# Patient Record
Sex: MALE | Race: WHITE | NOT HISPANIC OR LATINO | Employment: OTHER | URBAN - METROPOLITAN AREA
[De-identification: names, ages, dates, MRNs, and addresses within clinical notes are randomized per-mention and may not be internally consistent; named-entity substitution may affect disease eponyms.]

---

## 2017-01-01 ENCOUNTER — TELEPHONE (OUTPATIENT)
Dept: NEUROLOGY | Facility: MEDICAL CENTER | Age: 82
End: 2017-01-01

## 2017-01-01 ENCOUNTER — TELEPHONE (OUTPATIENT)
Dept: MEDICAL GROUP | Facility: CLINIC | Age: 82
End: 2017-01-01

## 2017-01-01 ENCOUNTER — HOSPITAL ENCOUNTER (OUTPATIENT)
Dept: LAB | Facility: MEDICAL CENTER | Age: 82
End: 2017-07-21
Attending: FAMILY MEDICINE
Payer: MEDICARE

## 2017-01-01 ENCOUNTER — TELEPHONE (OUTPATIENT)
Dept: CARDIOLOGY | Facility: MEDICAL CENTER | Age: 82
End: 2017-01-01

## 2017-01-01 ENCOUNTER — OFFICE VISIT (OUTPATIENT)
Dept: NEPHROLOGY | Facility: MEDICAL CENTER | Age: 82
End: 2017-01-01
Payer: MEDICARE

## 2017-01-01 ENCOUNTER — HOSPITAL ENCOUNTER (OUTPATIENT)
Dept: RADIOLOGY | Facility: MEDICAL CENTER | Age: 82
End: 2017-05-23
Attending: PHYSICIAN ASSISTANT
Payer: MEDICARE

## 2017-01-01 ENCOUNTER — HOSPITAL ENCOUNTER (OUTPATIENT)
Facility: MEDICAL CENTER | Age: 82
End: 2017-11-14
Attending: FAMILY MEDICINE
Payer: MEDICARE

## 2017-01-01 ENCOUNTER — PATIENT MESSAGE (OUTPATIENT)
Dept: MEDICAL GROUP | Facility: CLINIC | Age: 82
End: 2017-01-01

## 2017-01-01 ENCOUNTER — APPOINTMENT (OUTPATIENT)
Dept: RADIOLOGY | Facility: MEDICAL CENTER | Age: 82
End: 2017-01-01
Attending: PSYCHIATRY & NEUROLOGY
Payer: MEDICARE

## 2017-01-01 ENCOUNTER — APPOINTMENT (OUTPATIENT)
Dept: RADIOLOGY | Facility: MEDICAL CENTER | Age: 82
DRG: 682 | End: 2017-01-01
Attending: EMERGENCY MEDICINE
Payer: MEDICARE

## 2017-01-01 ENCOUNTER — APPOINTMENT (OUTPATIENT)
Dept: RADIOLOGY | Facility: MEDICAL CENTER | Age: 82
End: 2017-01-01
Attending: EMERGENCY MEDICINE
Payer: MEDICARE

## 2017-01-01 ENCOUNTER — OFFICE VISIT (OUTPATIENT)
Dept: URGENT CARE | Facility: PHYSICIAN GROUP | Age: 82
End: 2017-01-01
Payer: MEDICARE

## 2017-01-01 ENCOUNTER — HOSPITAL ENCOUNTER (OUTPATIENT)
Dept: RADIOLOGY | Facility: MEDICAL CENTER | Age: 82
End: 2017-03-31
Attending: PSYCHIATRY & NEUROLOGY
Payer: MEDICARE

## 2017-01-01 ENCOUNTER — PATIENT OUTREACH (OUTPATIENT)
Dept: HEALTH INFORMATION MANAGEMENT | Facility: OTHER | Age: 82
End: 2017-01-01

## 2017-01-01 ENCOUNTER — HOSPITAL ENCOUNTER (OUTPATIENT)
Dept: LAB | Facility: MEDICAL CENTER | Age: 82
End: 2017-03-20
Attending: FAMILY MEDICINE
Payer: MEDICARE

## 2017-01-01 ENCOUNTER — APPOINTMENT (OUTPATIENT)
Dept: URGENT CARE | Facility: PHYSICIAN GROUP | Age: 82
End: 2017-01-01
Payer: MEDICARE

## 2017-01-01 ENCOUNTER — OFFICE VISIT (OUTPATIENT)
Dept: NEUROLOGY | Facility: MEDICAL CENTER | Age: 82
End: 2017-01-01
Payer: MEDICARE

## 2017-01-01 ENCOUNTER — APPOINTMENT (OUTPATIENT)
Dept: RADIOLOGY | Facility: MEDICAL CENTER | Age: 82
DRG: 689 | End: 2017-01-01
Attending: EMERGENCY MEDICINE
Payer: MEDICARE

## 2017-01-01 ENCOUNTER — HOSPITAL ENCOUNTER (OUTPATIENT)
Dept: LAB | Facility: MEDICAL CENTER | Age: 82
End: 2017-03-20
Attending: INTERNAL MEDICINE
Payer: MEDICARE

## 2017-01-01 ENCOUNTER — HOSPITAL ENCOUNTER (INPATIENT)
Facility: MEDICAL CENTER | Age: 82
LOS: 4 days | DRG: 682 | End: 2017-06-01
Attending: EMERGENCY MEDICINE | Admitting: HOSPITALIST
Payer: MEDICARE

## 2017-01-01 ENCOUNTER — NON-PROVIDER VISIT (OUTPATIENT)
Dept: NEUROLOGY | Facility: MEDICAL CENTER | Age: 82
End: 2017-01-01
Payer: MEDICARE

## 2017-01-01 ENCOUNTER — TELEPHONE (OUTPATIENT)
Dept: MEDICAL GROUP | Facility: PHYSICIAN GROUP | Age: 82
End: 2017-01-01

## 2017-01-01 ENCOUNTER — HOSPITAL ENCOUNTER (INPATIENT)
Facility: MEDICAL CENTER | Age: 82
LOS: 2 days | DRG: 689 | End: 2017-09-06
Attending: EMERGENCY MEDICINE | Admitting: INTERNAL MEDICINE
Payer: MEDICARE

## 2017-01-01 ENCOUNTER — RESOLUTE PROFESSIONAL BILLING HOSPITAL PROF FEE (OUTPATIENT)
Dept: HOSPITALIST | Facility: MEDICAL CENTER | Age: 82
End: 2017-01-01
Payer: MEDICARE

## 2017-01-01 ENCOUNTER — HOSPITAL ENCOUNTER (EMERGENCY)
Facility: MEDICAL CENTER | Age: 82
End: 2017-05-16
Attending: EMERGENCY MEDICINE
Payer: MEDICARE

## 2017-01-01 ENCOUNTER — OFFICE VISIT (OUTPATIENT)
Dept: MEDICAL GROUP | Facility: CLINIC | Age: 82
End: 2017-01-01
Payer: MEDICARE

## 2017-01-01 ENCOUNTER — DEVICE CHECK (OUTPATIENT)
Dept: CARDIOLOGY | Facility: MEDICAL CENTER | Age: 82
End: 2017-01-01

## 2017-01-01 ENCOUNTER — HOSPITAL ENCOUNTER (OUTPATIENT)
Dept: LAB | Facility: MEDICAL CENTER | Age: 82
End: 2017-05-11
Attending: INTERNAL MEDICINE
Payer: MEDICARE

## 2017-01-01 ENCOUNTER — HOSPITAL ENCOUNTER (EMERGENCY)
Facility: MEDICAL CENTER | Age: 82
End: 2017-04-30
Attending: EMERGENCY MEDICINE
Payer: MEDICARE

## 2017-01-01 ENCOUNTER — HOSPITAL ENCOUNTER (EMERGENCY)
Facility: MEDICAL CENTER | Age: 82
End: 2017-11-21
Attending: EMERGENCY MEDICINE
Payer: MEDICARE

## 2017-01-01 ENCOUNTER — TELEPHONE (OUTPATIENT)
Dept: NEPHROLOGY | Facility: MEDICAL CENTER | Age: 82
End: 2017-01-01

## 2017-01-01 VITALS
RESPIRATION RATE: 14 BRPM | BODY MASS INDEX: 22.4 KG/M2 | HEART RATE: 55 BPM | DIASTOLIC BLOOD PRESSURE: 60 MMHG | WEIGHT: 160 LBS | HEIGHT: 71 IN | TEMPERATURE: 98 F | SYSTOLIC BLOOD PRESSURE: 150 MMHG

## 2017-01-01 VITALS
SYSTOLIC BLOOD PRESSURE: 152 MMHG | OXYGEN SATURATION: 97 % | TEMPERATURE: 97.5 F | WEIGHT: 156 LBS | BODY MASS INDEX: 21.84 KG/M2 | RESPIRATION RATE: 17 BRPM | HEART RATE: 56 BPM | HEIGHT: 71 IN | DIASTOLIC BLOOD PRESSURE: 44 MMHG

## 2017-01-01 VITALS
RESPIRATION RATE: 20 BRPM | WEIGHT: 164 LBS | HEIGHT: 71 IN | TEMPERATURE: 97.7 F | OXYGEN SATURATION: 97 % | HEART RATE: 53 BPM | SYSTOLIC BLOOD PRESSURE: 152 MMHG | BODY MASS INDEX: 22.96 KG/M2 | DIASTOLIC BLOOD PRESSURE: 60 MMHG

## 2017-01-01 VITALS
BODY MASS INDEX: 26.21 KG/M2 | RESPIRATION RATE: 14 BRPM | TEMPERATURE: 98.1 F | HEIGHT: 67 IN | OXYGEN SATURATION: 98 % | SYSTOLIC BLOOD PRESSURE: 150 MMHG | DIASTOLIC BLOOD PRESSURE: 56 MMHG | WEIGHT: 167 LBS | HEART RATE: 64 BPM

## 2017-01-01 VITALS
BODY MASS INDEX: 23.1 KG/M2 | HEART RATE: 78 BPM | DIASTOLIC BLOOD PRESSURE: 64 MMHG | OXYGEN SATURATION: 98 % | RESPIRATION RATE: 16 BRPM | WEIGHT: 165 LBS | TEMPERATURE: 98 F | SYSTOLIC BLOOD PRESSURE: 136 MMHG | HEIGHT: 71 IN

## 2017-01-01 VITALS
HEART RATE: 60 BPM | WEIGHT: 146 LBS | HEIGHT: 71 IN | BODY MASS INDEX: 20.44 KG/M2 | TEMPERATURE: 98.1 F | OXYGEN SATURATION: 94 % | DIASTOLIC BLOOD PRESSURE: 50 MMHG | RESPIRATION RATE: 16 BRPM | SYSTOLIC BLOOD PRESSURE: 110 MMHG

## 2017-01-01 VITALS
SYSTOLIC BLOOD PRESSURE: 140 MMHG | HEART RATE: 62 BPM | DIASTOLIC BLOOD PRESSURE: 52 MMHG | WEIGHT: 165 LBS | RESPIRATION RATE: 14 BRPM | OXYGEN SATURATION: 94 % | TEMPERATURE: 99.2 F | BODY MASS INDEX: 23.02 KG/M2

## 2017-01-01 VITALS
BODY MASS INDEX: 22.58 KG/M2 | DIASTOLIC BLOOD PRESSURE: 50 MMHG | WEIGHT: 149 LBS | HEART RATE: 56 BPM | SYSTOLIC BLOOD PRESSURE: 152 MMHG | HEIGHT: 68 IN | RESPIRATION RATE: 16 BRPM | TEMPERATURE: 97.6 F

## 2017-01-01 VITALS
OXYGEN SATURATION: 93 % | DIASTOLIC BLOOD PRESSURE: 62 MMHG | HEART RATE: 82 BPM | SYSTOLIC BLOOD PRESSURE: 158 MMHG | RESPIRATION RATE: 16 BRPM | BODY MASS INDEX: 22.4 KG/M2 | HEIGHT: 71 IN | WEIGHT: 160 LBS | TEMPERATURE: 98.8 F

## 2017-01-01 VITALS
DIASTOLIC BLOOD PRESSURE: 58 MMHG | HEART RATE: 68 BPM | HEIGHT: 70 IN | TEMPERATURE: 97.9 F | SYSTOLIC BLOOD PRESSURE: 158 MMHG | WEIGHT: 149 LBS | RESPIRATION RATE: 16 BRPM | OXYGEN SATURATION: 99 % | BODY MASS INDEX: 21.33 KG/M2

## 2017-01-01 VITALS
OXYGEN SATURATION: 97 % | RESPIRATION RATE: 16 BRPM | SYSTOLIC BLOOD PRESSURE: 152 MMHG | TEMPERATURE: 98.1 F | BODY MASS INDEX: 25.25 KG/M2 | HEART RATE: 55 BPM | WEIGHT: 176.37 LBS | DIASTOLIC BLOOD PRESSURE: 43 MMHG | HEIGHT: 70 IN

## 2017-01-01 VITALS
BODY MASS INDEX: 21.07 KG/M2 | TEMPERATURE: 98 F | DIASTOLIC BLOOD PRESSURE: 72 MMHG | OXYGEN SATURATION: 96 % | RESPIRATION RATE: 16 BRPM | HEART RATE: 66 BPM | SYSTOLIC BLOOD PRESSURE: 154 MMHG | WEIGHT: 151 LBS

## 2017-01-01 VITALS
HEIGHT: 68 IN | RESPIRATION RATE: 18 BRPM | DIASTOLIC BLOOD PRESSURE: 62 MMHG | HEART RATE: 67 BPM | WEIGHT: 144.18 LBS | BODY MASS INDEX: 21.85 KG/M2 | SYSTOLIC BLOOD PRESSURE: 137 MMHG | TEMPERATURE: 96.7 F | OXYGEN SATURATION: 97 %

## 2017-01-01 VITALS
HEIGHT: 71 IN | BODY MASS INDEX: 21.73 KG/M2 | TEMPERATURE: 98.2 F | RESPIRATION RATE: 18 BRPM | HEART RATE: 56 BPM | SYSTOLIC BLOOD PRESSURE: 152 MMHG | WEIGHT: 155.2 LBS | DIASTOLIC BLOOD PRESSURE: 58 MMHG | OXYGEN SATURATION: 92 %

## 2017-01-01 VITALS
TEMPERATURE: 98 F | DIASTOLIC BLOOD PRESSURE: 60 MMHG | BODY MASS INDEX: 25.58 KG/M2 | HEIGHT: 67 IN | WEIGHT: 163 LBS | RESPIRATION RATE: 14 BRPM | HEART RATE: 60 BPM | SYSTOLIC BLOOD PRESSURE: 144 MMHG

## 2017-01-01 DIAGNOSIS — I10 ESSENTIAL HYPERTENSION: ICD-10-CM

## 2017-01-01 DIAGNOSIS — J18.9 PNEUMONIA DUE TO INFECTIOUS ORGANISM, UNSPECIFIED LATERALITY, UNSPECIFIED PART OF LUNG: ICD-10-CM

## 2017-01-01 DIAGNOSIS — E03.9 HYPOTHYROIDISM, UNSPECIFIED TYPE: ICD-10-CM

## 2017-01-01 DIAGNOSIS — R41.0 TRANSIENT CONFUSION: Primary | ICD-10-CM

## 2017-01-01 DIAGNOSIS — E03.4 IDIOPATHIC ATROPHIC HYPOTHYROIDISM: ICD-10-CM

## 2017-01-01 DIAGNOSIS — N40.1 BENIGN PROSTATIC HYPERPLASIA WITH LOWER URINARY TRACT SYMPTOMS, UNSPECIFIED MORPHOLOGY: ICD-10-CM

## 2017-01-01 DIAGNOSIS — N17.9 ACUTE KIDNEY INJURY SUPERIMPOSED ON CHRONIC KIDNEY DISEASE (HCC): ICD-10-CM

## 2017-01-01 DIAGNOSIS — N18.30 CHRONIC KIDNEY DISEASE (CKD), STAGE III (MODERATE) (HCC): ICD-10-CM

## 2017-01-01 DIAGNOSIS — M1A.00X0 IDIOPATHIC CHRONIC GOUT WITHOUT TOPHUS, UNSPECIFIED SITE: ICD-10-CM

## 2017-01-01 DIAGNOSIS — E61.1 IRON DEFICIENCY: ICD-10-CM

## 2017-01-01 DIAGNOSIS — M79.642 HAND PAIN, LEFT: ICD-10-CM
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
## 2017-01-01 DIAGNOSIS — E87.5 HYPERKALEMIA: ICD-10-CM

## 2017-01-01 DIAGNOSIS — E53.8 B12 DEFICIENCY: ICD-10-CM

## 2017-01-01 DIAGNOSIS — I25.701 CORONARY ARTERY DISEASE INVOLVING CORONARY BYPASS GRAFT OF NATIVE HEART WITH ANGINA PECTORIS WITH DOCUMENTED SPASM (HCC): ICD-10-CM

## 2017-01-01 DIAGNOSIS — N40.1 BENIGN NON-NODULAR PROSTATIC HYPERPLASIA WITH LOWER URINARY TRACT SYMPTOMS: ICD-10-CM

## 2017-01-01 DIAGNOSIS — I20.89 ANGINA EFFORT: ICD-10-CM

## 2017-01-01 DIAGNOSIS — K59.01 SLOW TRANSIT CONSTIPATION: ICD-10-CM

## 2017-01-01 DIAGNOSIS — N18.9 ACUTE KIDNEY INJURY SUPERIMPOSED ON CHRONIC KIDNEY DISEASE (HCC): ICD-10-CM

## 2017-01-01 DIAGNOSIS — R42 VERTIGO: ICD-10-CM

## 2017-01-01 DIAGNOSIS — Z23 NEED FOR PNEUMOCOCCAL VACCINATION: ICD-10-CM

## 2017-01-01 DIAGNOSIS — R41.0 TRANSIENT CONFUSION: ICD-10-CM

## 2017-01-01 DIAGNOSIS — I27.20 PULMONARY HTN (HCC): ICD-10-CM

## 2017-01-01 DIAGNOSIS — I77.71 CAROTID ARTERY DISSECTION (HCC): ICD-10-CM

## 2017-01-01 DIAGNOSIS — N25.81 SECONDARY HYPERPARATHYROIDISM OF RENAL ORIGIN (HCC): ICD-10-CM

## 2017-01-01 DIAGNOSIS — N18.5 CKD (CHRONIC KIDNEY DISEASE), STAGE V (HCC): ICD-10-CM

## 2017-01-01 DIAGNOSIS — D51.0 PERNICIOUS ANEMIA: ICD-10-CM

## 2017-01-01 DIAGNOSIS — N18.4 CHRONIC KIDNEY DISEASE, STAGE IV (SEVERE) (HCC): ICD-10-CM

## 2017-01-01 DIAGNOSIS — R32 INTERMITTENT URINARY INCONTINENCE: ICD-10-CM

## 2017-01-01 DIAGNOSIS — R41.0 CONFUSION AND DISORIENTATION: ICD-10-CM

## 2017-01-01 DIAGNOSIS — R00.1 BRADYCARDIA: ICD-10-CM

## 2017-01-01 DIAGNOSIS — L08.9 INFECTED SKIN TEAR: ICD-10-CM

## 2017-01-01 DIAGNOSIS — H93.13 TINNITUS, BILATERAL: ICD-10-CM

## 2017-01-01 DIAGNOSIS — M10.9 GOUTY ARTHRITIS: ICD-10-CM

## 2017-01-01 DIAGNOSIS — H93.13 RINGING IN EARS, BILATERAL: ICD-10-CM

## 2017-01-01 DIAGNOSIS — E53.8 VITAMIN B 12 DEFICIENCY: ICD-10-CM

## 2017-01-01 DIAGNOSIS — F03.91 DEMENTIA WITH BEHAVIORAL DISTURBANCE, UNSPECIFIED DEMENTIA TYPE: ICD-10-CM

## 2017-01-01 DIAGNOSIS — R41.3 MEMORY PROBLEM: ICD-10-CM

## 2017-01-01 DIAGNOSIS — R41.0 CHRONIC CONFUSION: ICD-10-CM

## 2017-01-01 DIAGNOSIS — E55.9 VITAMIN D DEFICIENCY DISEASE: ICD-10-CM

## 2017-01-01 DIAGNOSIS — D53.9 MACROCYTIC ANEMIA: ICD-10-CM

## 2017-01-01 DIAGNOSIS — I25.111 CORONARY ARTERY DISEASE INVOLVING NATIVE CORONARY ARTERY OF NATIVE HEART WITH ANGINA PECTORIS WITH DOCUMENTED SPASM (HCC): ICD-10-CM

## 2017-01-01 DIAGNOSIS — R07.9 CHEST PAIN, UNSPECIFIED TYPE: ICD-10-CM

## 2017-01-01 DIAGNOSIS — Z91.81 AT RISK FOR FALLS: ICD-10-CM

## 2017-01-01 DIAGNOSIS — R29.898 BILATERAL LEG WEAKNESS: ICD-10-CM

## 2017-01-01 DIAGNOSIS — T14.8XXA INFECTED SKIN TEAR: ICD-10-CM

## 2017-01-01 DIAGNOSIS — E46 PROTEIN CALORIE MALNUTRITION (HCC): ICD-10-CM

## 2017-01-01 DIAGNOSIS — E44.1 MILD PROTEIN-CALORIE MALNUTRITION (HCC): ICD-10-CM

## 2017-01-01 DIAGNOSIS — N28.9 RENAL INSUFFICIENCY: ICD-10-CM

## 2017-01-01 DIAGNOSIS — Z51.89 VISIT FOR WOUND CHECK: ICD-10-CM

## 2017-01-01 DIAGNOSIS — R47.1 DYSARTHRIA: ICD-10-CM

## 2017-01-01 LAB
25(OH)D3 SERPL-MCNC: 84 NG/ML (ref 30–100)
25(OH)D3 SERPL-MCNC: 85 NG/ML (ref 30–100)
ALBUMIN SERPL BCP-MCNC: 3.2 G/DL (ref 3.2–4.9)
ALBUMIN SERPL BCP-MCNC: 3.7 G/DL (ref 3.2–4.9)
ALBUMIN SERPL BCP-MCNC: 3.7 G/DL (ref 3.2–4.9)
ALBUMIN SERPL BCP-MCNC: 3.8 G/DL (ref 3.2–4.9)
ALBUMIN SERPL BCP-MCNC: 3.9 G/DL (ref 3.2–4.9)
ALBUMIN SERPL BCP-MCNC: 3.9 G/DL (ref 3.2–4.9)
ALBUMIN/GLOB SERPL: 1.3 G/DL
ALBUMIN/GLOB SERPL: 1.4 G/DL
ALBUMIN/GLOB SERPL: 1.5 G/DL
ALBUMIN/GLOB SERPL: 1.8 G/DL
ALP SERPL-CCNC: 48 U/L (ref 30–99)
ALP SERPL-CCNC: 53 U/L (ref 30–99)
ALP SERPL-CCNC: 56 U/L (ref 30–99)
ALP SERPL-CCNC: 61 U/L (ref 30–99)
ALP SERPL-CCNC: 62 U/L (ref 30–99)
ALP SERPL-CCNC: 71 U/L (ref 30–99)
ALT SERPL-CCNC: 13 U/L (ref 2–50)
ALT SERPL-CCNC: 13 U/L (ref 2–50)
ALT SERPL-CCNC: 6 U/L (ref 2–50)
ALT SERPL-CCNC: 8 U/L (ref 2–50)
ALT SERPL-CCNC: 8 U/L (ref 2–50)
ALT SERPL-CCNC: 9 U/L (ref 2–50)
AMORPH CRY #/AREA URNS HPF: PRESENT /HPF
AMPHET UR QL SCN: NEGATIVE
ANION GAP SERPL CALC-SCNC: 10 MMOL/L (ref 0–11.9)
ANION GAP SERPL CALC-SCNC: 5 MMOL/L (ref 0–11.9)
ANION GAP SERPL CALC-SCNC: 6 MMOL/L (ref 0–11.9)
ANION GAP SERPL CALC-SCNC: 7 MMOL/L (ref 0–11.9)
ANION GAP SERPL CALC-SCNC: 8 MMOL/L (ref 0–11.9)
ANION GAP SERPL CALC-SCNC: 9 MMOL/L (ref 0–11.9)
APPEARANCE UR: ABNORMAL
APPEARANCE UR: CLEAR
AST SERPL-CCNC: 11 U/L (ref 12–45)
AST SERPL-CCNC: 11 U/L (ref 12–45)
AST SERPL-CCNC: 12 U/L (ref 12–45)
AST SERPL-CCNC: 12 U/L (ref 12–45)
AST SERPL-CCNC: 13 U/L (ref 12–45)
AST SERPL-CCNC: 19 U/L (ref 12–45)
BACTERIA #/AREA URNS HPF: ABNORMAL /HPF
BACTERIA #/AREA URNS HPF: NEGATIVE /HPF
BACTERIA #/AREA URNS HPF: NEGATIVE /HPF
BACTERIA BLD CULT: NORMAL
BACTERIA UR CULT: NORMAL
BARBITURATES UR QL SCN: NEGATIVE
BASOPHILS # BLD AUTO: 0 X10E3/UL (ref 0–0.2)
BASOPHILS # BLD AUTO: 0.4 % (ref 0–1.8)
BASOPHILS # BLD AUTO: 0.5 % (ref 0–1.8)
BASOPHILS # BLD AUTO: 0.5 % (ref 0–1.8)
BASOPHILS # BLD AUTO: 0.7 % (ref 0–1.8)
BASOPHILS # BLD AUTO: 0.7 % (ref 0–1.8)
BASOPHILS # BLD AUTO: 0.9 % (ref 0–1.8)
BASOPHILS # BLD: 0.02 K/UL (ref 0–0.12)
BASOPHILS # BLD: 0.02 K/UL (ref 0–0.12)
BASOPHILS # BLD: 0.03 K/UL (ref 0–0.12)
BASOPHILS # BLD: 0.04 K/UL (ref 0–0.12)
BASOPHILS NFR BLD AUTO: 0 %
BENZODIAZ UR QL SCN: NEGATIVE
BILIRUB SERPL-MCNC: 1.3 MG/DL (ref 0.1–1.5)
BILIRUB SERPL-MCNC: 1.3 MG/DL (ref 0.1–1.5)
BILIRUB SERPL-MCNC: 1.5 MG/DL (ref 0.1–1.5)
BILIRUB SERPL-MCNC: 1.6 MG/DL (ref 0.1–1.5)
BILIRUB SERPL-MCNC: 1.8 MG/DL (ref 0.1–1.5)
BILIRUB SERPL-MCNC: 1.8 MG/DL (ref 0.1–1.5)
BILIRUB UR QL STRIP.AUTO: NEGATIVE
BNP SERPL-MCNC: 704 PG/ML (ref 0–100)
BUN SERPL-MCNC: 21 MG/DL (ref 8–22)
BUN SERPL-MCNC: 25 MG/DL (ref 8–22)
BUN SERPL-MCNC: 27 MG/DL (ref 8–22)
BUN SERPL-MCNC: 32 MG/DL (ref 8–22)
BUN SERPL-MCNC: 34 MG/DL (ref 8–22)
BUN SERPL-MCNC: 35 MG/DL (ref 8–22)
BUN SERPL-MCNC: 44 MG/DL (ref 8–22)
BUN SERPL-MCNC: 44 MG/DL (ref 8–22)
BUN SERPL-MCNC: 45 MG/DL (ref 8–22)
BUN SERPL-MCNC: 46 MG/DL (ref 8–22)
BUN SERPL-MCNC: 49 MG/DL (ref 8–22)
BUN SERPL-MCNC: 59 MG/DL (ref 8–22)
BUN SERPL-MCNC: 66 MG/DL (ref 8–22)
BUN SERPL-MCNC: 66 MG/DL (ref 8–22)
BZE UR QL SCN: NEGATIVE
CALCIUM SERPL-MCNC: 10.3 MG/DL (ref 8.5–10.5)
CALCIUM SERPL-MCNC: 8.2 MG/DL (ref 8.5–10.5)
CALCIUM SERPL-MCNC: 8.9 MG/DL (ref 8.5–10.5)
CALCIUM SERPL-MCNC: 8.9 MG/DL (ref 8.5–10.5)
CALCIUM SERPL-MCNC: 9 MG/DL (ref 8.5–10.5)
CALCIUM SERPL-MCNC: 9 MG/DL (ref 8.5–10.5)
CALCIUM SERPL-MCNC: 9.2 MG/DL (ref 8.5–10.5)
CALCIUM SERPL-MCNC: 9.3 MG/DL (ref 8.5–10.5)
CALCIUM SERPL-MCNC: 9.5 MG/DL (ref 8.5–10.5)
CALCIUM SERPL-MCNC: 9.6 MG/DL (ref 8.5–10.5)
CALCIUM SERPL-MCNC: 9.7 MG/DL (ref 8.5–10.5)
CANNABINOIDS UR QL SCN: NEGATIVE
CHLORIDE SERPL-SCNC: 105 MMOL/L (ref 96–112)
CHLORIDE SERPL-SCNC: 107 MMOL/L (ref 96–112)
CHLORIDE SERPL-SCNC: 108 MMOL/L (ref 96–112)
CHLORIDE SERPL-SCNC: 108 MMOL/L (ref 96–112)
CHLORIDE SERPL-SCNC: 109 MMOL/L (ref 96–112)
CHLORIDE SERPL-SCNC: 110 MMOL/L (ref 96–112)
CHLORIDE SERPL-SCNC: 110 MMOL/L (ref 96–112)
CHLORIDE SERPL-SCNC: 112 MMOL/L (ref 96–112)
CHLORIDE SERPL-SCNC: 113 MMOL/L (ref 96–112)
CHLORIDE SERPL-SCNC: 114 MMOL/L (ref 96–112)
CHLORIDE SERPL-SCNC: 115 MMOL/L (ref 96–112)
CHOLEST SERPL-MCNC: 116 MG/DL (ref 100–199)
CO2 SERPL-SCNC: 16 MMOL/L (ref 20–33)
CO2 SERPL-SCNC: 18 MMOL/L (ref 20–33)
CO2 SERPL-SCNC: 18 MMOL/L (ref 20–33)
CO2 SERPL-SCNC: 20 MMOL/L (ref 20–33)
CO2 SERPL-SCNC: 22 MMOL/L (ref 20–33)
CO2 SERPL-SCNC: 23 MMOL/L (ref 20–33)
CO2 SERPL-SCNC: 25 MMOL/L (ref 20–33)
COLOR UR AUTO: NORMAL
COLOR UR: NORMAL
COLOR UR: YELLOW
CREAT SERPL-MCNC: 1.57 MG/DL (ref 0.5–1.4)
CREAT SERPL-MCNC: 1.67 MG/DL (ref 0.5–1.4)
CREAT SERPL-MCNC: 1.67 MG/DL (ref 0.5–1.4)
CREAT SERPL-MCNC: 1.71 MG/DL (ref 0.5–1.4)
CREAT SERPL-MCNC: 1.84 MG/DL (ref 0.5–1.4)
CREAT SERPL-MCNC: 2 MG/DL (ref 0.5–1.4)
CREAT SERPL-MCNC: 2.02 MG/DL (ref 0.5–1.4)
CREAT SERPL-MCNC: 2.05 MG/DL (ref 0.5–1.4)
CREAT SERPL-MCNC: 2.2 MG/DL (ref 0.5–1.4)
CREAT SERPL-MCNC: 2.24 MG/DL (ref 0.5–1.4)
CREAT SERPL-MCNC: 2.29 MG/DL (ref 0.5–1.4)
CREAT SERPL-MCNC: 2.3 MG/DL (ref 0.5–1.4)
CREAT SERPL-MCNC: 2.39 MG/DL (ref 0.5–1.4)
CREAT SERPL-MCNC: 2.51 MG/DL (ref 0.5–1.4)
CREAT UR-MCNC: 50.3 MG/DL
CREAT UR-MCNC: 92.8 MG/DL
CULTURE IF INDICATED INDCX: NO UA CULTURE
CULTURE IF INDICATED INDCX: YES UA CULTURE
EKG IMPRESSION: NORMAL
EOSINOPHIL # BLD AUTO: 0.03 K/UL (ref 0–0.51)
EOSINOPHIL # BLD AUTO: 0.08 K/UL (ref 0–0.51)
EOSINOPHIL # BLD AUTO: 0.09 K/UL (ref 0–0.51)
EOSINOPHIL # BLD AUTO: 0.1 X10E3/UL (ref 0–0.4)
EOSINOPHIL # BLD AUTO: 0.12 K/UL (ref 0–0.51)
EOSINOPHIL # BLD AUTO: 0.18 K/UL (ref 0–0.51)
EOSINOPHIL # BLD AUTO: 0.19 K/UL (ref 0–0.51)
EOSINOPHIL NFR BLD AUTO: 2 %
EOSINOPHIL NFR BLD: 0.4 % (ref 0–6.9)
EOSINOPHIL NFR BLD: 1.5 % (ref 0–6.9)
EOSINOPHIL NFR BLD: 1.8 % (ref 0–6.9)
EOSINOPHIL NFR BLD: 1.8 % (ref 0–6.9)
EOSINOPHIL NFR BLD: 2 % (ref 0–6.9)
EOSINOPHIL NFR BLD: 2.1 % (ref 0–6.9)
EOSINOPHIL NFR BLD: 2.7 % (ref 0–6.9)
EOSINOPHIL NFR BLD: 2.8 % (ref 0–6.9)
EOSINOPHIL NFR BLD: 3.2 % (ref 0–6.9)
EPI CELLS #/AREA URNS HPF: ABNORMAL /HPF
EPI CELLS #/AREA URNS HPF: NEGATIVE /HPF
EPI CELLS #/AREA URNS HPF: NEGATIVE /HPF
ERYTHROCYTE [DISTWIDTH] IN BLOOD BY AUTOMATED COUNT: 14.5 % (ref 12.3–15.4)
ERYTHROCYTE [DISTWIDTH] IN BLOOD BY AUTOMATED COUNT: 47.9 FL (ref 35.9–50)
ERYTHROCYTE [DISTWIDTH] IN BLOOD BY AUTOMATED COUNT: 48.3 FL (ref 35.9–50)
ERYTHROCYTE [DISTWIDTH] IN BLOOD BY AUTOMATED COUNT: 51 FL (ref 35.9–50)
ERYTHROCYTE [DISTWIDTH] IN BLOOD BY AUTOMATED COUNT: 51.5 FL (ref 35.9–50)
ERYTHROCYTE [DISTWIDTH] IN BLOOD BY AUTOMATED COUNT: 52.4 FL (ref 35.9–50)
ERYTHROCYTE [DISTWIDTH] IN BLOOD BY AUTOMATED COUNT: 53.1 FL (ref 35.9–50)
ERYTHROCYTE [DISTWIDTH] IN BLOOD BY AUTOMATED COUNT: 53.7 FL (ref 35.9–50)
ERYTHROCYTE [DISTWIDTH] IN BLOOD BY AUTOMATED COUNT: 54 FL (ref 35.9–50)
ERYTHROCYTE [DISTWIDTH] IN BLOOD BY AUTOMATED COUNT: 54.2 FL (ref 35.9–50)
ERYTHROCYTE [DISTWIDTH] IN BLOOD BY AUTOMATED COUNT: 54.9 FL (ref 35.9–50)
ERYTHROCYTE [DISTWIDTH] IN BLOOD BY AUTOMATED COUNT: 55.8 FL (ref 35.9–50)
FERRITIN SERPL-MCNC: 103.4 NG/ML (ref 22–322)
GFR SERPL CREATININE-BSD FRML MDRD: 25 ML/MIN/1.73 M 2
GFR SERPL CREATININE-BSD FRML MDRD: 26 ML/MIN/1.73 M 2
GFR SERPL CREATININE-BSD FRML MDRD: 27 ML/MIN/1.73 M 2
GFR SERPL CREATININE-BSD FRML MDRD: 27 ML/MIN/1.73 M 2
GFR SERPL CREATININE-BSD FRML MDRD: 31 ML/MIN/1.73 M 2
GFR SERPL CREATININE-BSD FRML MDRD: 32 ML/MIN/1.73 M 2
GFR SERPL CREATININE-BSD FRML MDRD: 32 ML/MIN/1.73 M 2
GFR SERPL CREATININE-BSD FRML MDRD: 35 ML/MIN/1.73 M 2
GFR SERPL CREATININE-BSD FRML MDRD: 38 ML/MIN/1.73 M 2
GFR SERPL CREATININE-BSD FRML MDRD: 39 ML/MIN/1.73 M 2
GFR SERPL CREATININE-BSD FRML MDRD: 39 ML/MIN/1.73 M 2
GFR SERPL CREATININE-BSD FRML MDRD: 42 ML/MIN/1.73 M 2
GLOBULIN SER CALC-MCNC: 2.1 G/DL (ref 1.9–3.5)
GLOBULIN SER CALC-MCNC: 2.4 G/DL (ref 1.9–3.5)
GLOBULIN SER CALC-MCNC: 2.6 G/DL (ref 1.9–3.5)
GLOBULIN SER CALC-MCNC: 2.7 G/DL (ref 1.9–3.5)
GLUCOSE SERPL-MCNC: 101 MG/DL (ref 65–99)
GLUCOSE SERPL-MCNC: 101 MG/DL (ref 65–99)
GLUCOSE SERPL-MCNC: 102 MG/DL (ref 65–99)
GLUCOSE SERPL-MCNC: 104 MG/DL (ref 65–99)
GLUCOSE SERPL-MCNC: 105 MG/DL (ref 65–99)
GLUCOSE SERPL-MCNC: 111 MG/DL (ref 65–99)
GLUCOSE SERPL-MCNC: 117 MG/DL (ref 65–99)
GLUCOSE SERPL-MCNC: 86 MG/DL (ref 65–99)
GLUCOSE SERPL-MCNC: 88 MG/DL (ref 65–99)
GLUCOSE SERPL-MCNC: 89 MG/DL (ref 65–99)
GLUCOSE SERPL-MCNC: 90 MG/DL (ref 65–99)
GLUCOSE SERPL-MCNC: 91 MG/DL (ref 65–99)
GLUCOSE SERPL-MCNC: 92 MG/DL (ref 65–99)
GLUCOSE SERPL-MCNC: 93 MG/DL (ref 65–99)
GLUCOSE UR STRIP.AUTO-MCNC: NEGATIVE MG/DL
HCT VFR BLD AUTO: 29.8 % (ref 42–52)
HCT VFR BLD AUTO: 30.2 % (ref 42–52)
HCT VFR BLD AUTO: 32 % (ref 42–52)
HCT VFR BLD AUTO: 33.4 % (ref 42–52)
HCT VFR BLD AUTO: 33.5 % (ref 37.5–51)
HCT VFR BLD AUTO: 33.7 % (ref 42–52)
HCT VFR BLD AUTO: 34 % (ref 42–52)
HCT VFR BLD AUTO: 34.4 % (ref 42–52)
HCT VFR BLD AUTO: 34.4 % (ref 42–52)
HCT VFR BLD AUTO: 35.1 % (ref 42–52)
HCT VFR BLD AUTO: 35.7 % (ref 42–52)
HCT VFR BLD AUTO: 36.2 % (ref 42–52)
HDLC SERPL-MCNC: 37 MG/DL
HGB BLD-MCNC: 10.4 G/DL (ref 14–18)
HGB BLD-MCNC: 10.8 G/DL (ref 14–18)
HGB BLD-MCNC: 11 G/DL (ref 14–18)
HGB BLD-MCNC: 11.1 G/DL (ref 14–18)
HGB BLD-MCNC: 11.3 G/DL (ref 12.6–17.7)
HGB BLD-MCNC: 11.3 G/DL (ref 14–18)
HGB BLD-MCNC: 11.3 G/DL (ref 14–18)
HGB BLD-MCNC: 11.4 G/DL (ref 14–18)
HGB BLD-MCNC: 11.5 G/DL (ref 14–18)
HGB BLD-MCNC: 11.9 G/DL (ref 14–18)
HGB BLD-MCNC: 9.8 G/DL (ref 14–18)
HGB BLD-MCNC: 9.8 G/DL (ref 14–18)
HYALINE CASTS #/AREA URNS LPF: ABNORMAL /LPF
HYALINE CASTS #/AREA URNS LPF: ABNORMAL /LPF
IMM GRANULOCYTES # BLD AUTO: 0.01 K/UL (ref 0–0.11)
IMM GRANULOCYTES # BLD AUTO: 0.02 K/UL (ref 0–0.11)
IMM GRANULOCYTES # BLD AUTO: 0.03 K/UL (ref 0–0.11)
IMM GRANULOCYTES # BLD AUTO: 0.03 K/UL (ref 0–0.11)
IMM GRANULOCYTES # BLD: 0 X10E3/UL (ref 0–0.1)
IMM GRANULOCYTES NFR BLD AUTO: 0.2 % (ref 0–0.9)
IMM GRANULOCYTES NFR BLD AUTO: 0.3 % (ref 0–0.9)
IMM GRANULOCYTES NFR BLD AUTO: 0.4 % (ref 0–0.9)
IMM GRANULOCYTES NFR BLD: 0 %
IMMATURE CELLS  115398: ABNORMAL
IRON SATN MFR SERPL: 13 % (ref 15–55)
IRON SATN MFR SERPL: 30 % (ref 15–55)
IRON SERPL-MCNC: 31 UG/DL (ref 50–180)
IRON SERPL-MCNC: 94 UG/DL (ref 50–180)
KETONES UR STRIP.AUTO-MCNC: NEGATIVE MG/DL
LACTATE BLD-SCNC: 1 MMOL/L (ref 0.5–2)
LACTATE BLD-SCNC: 1.1 MMOL/L (ref 0.5–2)
LDLC SERPL CALC-MCNC: 64 MG/DL
LEUKOCYTE ESTERASE UR QL STRIP.AUTO: ABNORMAL
LEUKOCYTE ESTERASE UR QL STRIP.AUTO: NEGATIVE
LEUKOCYTE ESTERASE UR QL STRIP.AUTO: NEGATIVE
LV EJECT FRACT  99904: 60
LYMPHOCYTES # BLD AUTO: 0.75 K/UL (ref 1–4.8)
LYMPHOCYTES # BLD AUTO: 0.95 K/UL (ref 1–4.8)
LYMPHOCYTES # BLD AUTO: 0.98 K/UL (ref 1–4.8)
LYMPHOCYTES # BLD AUTO: 0.99 K/UL (ref 1–4.8)
LYMPHOCYTES # BLD AUTO: 1.05 K/UL (ref 1–4.8)
LYMPHOCYTES # BLD AUTO: 1.11 K/UL (ref 1–4.8)
LYMPHOCYTES # BLD AUTO: 1.22 K/UL (ref 1–4.8)
LYMPHOCYTES # BLD AUTO: 1.3 X10E3/UL (ref 0.7–3.1)
LYMPHOCYTES NFR BLD AUTO: 27 %
LYMPHOCYTES NFR BLD: 13 % (ref 22–41)
LYMPHOCYTES NFR BLD: 15.9 % (ref 22–41)
LYMPHOCYTES NFR BLD: 16.5 % (ref 22–41)
LYMPHOCYTES NFR BLD: 17.9 % (ref 22–41)
LYMPHOCYTES NFR BLD: 19.2 % (ref 22–41)
LYMPHOCYTES NFR BLD: 19.8 % (ref 22–41)
LYMPHOCYTES NFR BLD: 23.5 % (ref 22–41)
LYMPHOCYTES NFR BLD: 23.5 % (ref 22–41)
LYMPHOCYTES NFR BLD: 24.3 % (ref 22–41)
MAGNESIUM SERPL-MCNC: 1.5 MG/DL (ref 1.5–2.5)
MCH RBC QN AUTO: 31.8 PG (ref 27–33)
MCH RBC QN AUTO: 31.9 PG (ref 27–33)
MCH RBC QN AUTO: 31.9 PG (ref 27–33)
MCH RBC QN AUTO: 32 PG (ref 27–33)
MCH RBC QN AUTO: 32.1 PG (ref 27–33)
MCH RBC QN AUTO: 32.2 PG (ref 27–33)
MCH RBC QN AUTO: 32.3 PG (ref 27–33)
MCH RBC QN AUTO: 32.4 PG (ref 26.6–33)
MCHC RBC AUTO-ENTMCNC: 31.8 G/DL (ref 33.7–35.3)
MCHC RBC AUTO-ENTMCNC: 32 G/DL (ref 33.7–35.3)
MCHC RBC AUTO-ENTMCNC: 32.2 G/DL (ref 33.7–35.3)
MCHC RBC AUTO-ENTMCNC: 32.3 G/DL (ref 33.7–35.3)
MCHC RBC AUTO-ENTMCNC: 32.5 G/DL (ref 33.7–35.3)
MCHC RBC AUTO-ENTMCNC: 32.5 G/DL (ref 33.7–35.3)
MCHC RBC AUTO-ENTMCNC: 32.8 G/DL (ref 33.7–35.3)
MCHC RBC AUTO-ENTMCNC: 32.9 G/DL (ref 33.7–35.3)
MCHC RBC AUTO-ENTMCNC: 32.9 G/DL (ref 33.7–35.3)
MCHC RBC AUTO-ENTMCNC: 33.3 G/DL (ref 33.7–35.3)
MCHC RBC AUTO-ENTMCNC: 33.5 G/DL (ref 33.7–35.3)
MCHC RBC AUTO-ENTMCNC: 33.7 G/DL (ref 31.5–35.7)
MCV RBC AUTO: 100 FL (ref 81.4–97.8)
MCV RBC AUTO: 100.6 FL (ref 81.4–97.8)
MCV RBC AUTO: 95.5 FL (ref 81.4–97.8)
MCV RBC AUTO: 95.5 FL (ref 81.4–97.8)
MCV RBC AUTO: 96 FL (ref 79–97)
MCV RBC AUTO: 97.2 FL (ref 81.4–97.8)
MCV RBC AUTO: 97.7 FL (ref 81.4–97.8)
MCV RBC AUTO: 98 FL (ref 81.4–97.8)
MCV RBC AUTO: 98.7 FL (ref 81.4–97.8)
MCV RBC AUTO: 98.8 FL (ref 81.4–97.8)
MCV RBC AUTO: 99.1 FL (ref 81.4–97.8)
MCV RBC AUTO: 99.4 FL (ref 81.4–97.8)
METHADONE UR QL SCN: NEGATIVE
MICRO URNS: ABNORMAL
MICRO URNS: NORMAL
MICRO URNS: NORMAL
MICROALBUMIN UR-MCNC: 2.8 MG/DL
MICROALBUMIN UR-MCNC: 6.8 MG/DL
MICROALBUMIN/CREAT UR: 56 MG/G (ref 0–30)
MICROALBUMIN/CREAT UR: 73 MG/G (ref 0–30)
MONOCYTES # BLD AUTO: 0.42 K/UL (ref 0–0.85)
MONOCYTES # BLD AUTO: 0.46 K/UL (ref 0–0.85)
MONOCYTES # BLD AUTO: 0.48 K/UL (ref 0–0.85)
MONOCYTES # BLD AUTO: 0.5 X10E3/UL (ref 0.1–0.9)
MONOCYTES # BLD AUTO: 0.53 K/UL (ref 0–0.85)
MONOCYTES # BLD AUTO: 0.56 K/UL (ref 0–0.85)
MONOCYTES # BLD AUTO: 0.67 K/UL (ref 0–0.85)
MONOCYTES # BLD AUTO: 0.7 K/UL (ref 0–0.85)
MONOCYTES # BLD AUTO: 0.76 K/UL (ref 0–0.85)
MONOCYTES # BLD AUTO: 0.88 K/UL (ref 0–0.85)
MONOCYTES NFR BLD AUTO: 10 % (ref 0–13.4)
MONOCYTES NFR BLD AUTO: 10.3 % (ref 0–13.4)
MONOCYTES NFR BLD AUTO: 10.3 % (ref 0–13.4)
MONOCYTES NFR BLD AUTO: 10.7 % (ref 0–13.4)
MONOCYTES NFR BLD AUTO: 11 %
MONOCYTES NFR BLD AUTO: 11.1 % (ref 0–13.4)
MONOCYTES NFR BLD AUTO: 11.5 % (ref 0–13.4)
MONOCYTES NFR BLD AUTO: 12.3 % (ref 0–13.4)
MONOCYTES NFR BLD AUTO: 14.8 % (ref 0–13.4)
MONOCYTES NFR BLD AUTO: 9.4 % (ref 0–13.4)
MORPHOLOGY BLD-IMP: ABNORMAL
MUCOUS THREADS #/AREA URNS HPF: ABNORMAL /HPF
NEUTROPHILS # BLD AUTO: 2.66 K/UL (ref 1.82–7.42)
NEUTROPHILS # BLD AUTO: 2.8 K/UL (ref 1.82–7.42)
NEUTROPHILS # BLD AUTO: 2.8 X10E3/UL (ref 1.4–7)
NEUTROPHILS # BLD AUTO: 2.87 K/UL (ref 1.82–7.42)
NEUTROPHILS # BLD AUTO: 3 K/UL (ref 1.82–7.42)
NEUTROPHILS # BLD AUTO: 3.34 K/UL (ref 1.82–7.42)
NEUTROPHILS # BLD AUTO: 3.72 K/UL (ref 1.82–7.42)
NEUTROPHILS # BLD AUTO: 4.3 K/UL (ref 1.82–7.42)
NEUTROPHILS # BLD AUTO: 4.65 K/UL (ref 1.82–7.42)
NEUTROPHILS # BLD AUTO: 5.68 K/UL (ref 1.82–7.42)
NEUTROPHILS NFR BLD AUTO: 60 %
NEUTROPHILS NFR BLD: 61.6 % (ref 44–72)
NEUTROPHILS NFR BLD: 62.6 % (ref 44–72)
NEUTROPHILS NFR BLD: 64.2 % (ref 44–72)
NEUTROPHILS NFR BLD: 66.1 % (ref 44–72)
NEUTROPHILS NFR BLD: 66.1 % (ref 44–72)
NEUTROPHILS NFR BLD: 67.5 % (ref 44–72)
NEUTROPHILS NFR BLD: 68.2 % (ref 44–72)
NEUTROPHILS NFR BLD: 69.5 % (ref 44–72)
NEUTROPHILS NFR BLD: 74.3 % (ref 44–72)
NITRITE UR QL STRIP.AUTO: NEGATIVE
NRBC # BLD AUTO: 0 K/UL
NRBC # BLD AUTO: 0.11 K/UL
NRBC BLD AUTO-RTO: 0 /100 WBC
NRBC BLD AUTO-RTO: 1.4 /100 WBC
NRBC BLD AUTO-RTO: ABNORMAL %
OPIATES UR QL SCN: NEGATIVE
OXYCODONE UR QL SCN: NEGATIVE
PCP UR QL SCN: NEGATIVE
PH UR STRIP.AUTO: 5 [PH]
PH UR STRIP.AUTO: 5 [PH]
PH UR STRIP.AUTO: 5.5 [PH]
PH UR STRIP.AUTO: 5.5 [PH]
PH UR: 6 [PH]
PLATELET # BLD AUTO: 102 K/UL (ref 164–446)
PLATELET # BLD AUTO: 103 K/UL (ref 164–446)
PLATELET # BLD AUTO: 106 K/UL (ref 164–446)
PLATELET # BLD AUTO: 107 K/UL (ref 164–446)
PLATELET # BLD AUTO: 109 K/UL (ref 164–446)
PLATELET # BLD AUTO: 110 K/UL (ref 164–446)
PLATELET # BLD AUTO: 111 X10E3/UL (ref 150–379)
PLATELET # BLD AUTO: 112 K/UL (ref 164–446)
PLATELET # BLD AUTO: 113 K/UL (ref 164–446)
PLATELET # BLD AUTO: 116 K/UL (ref 164–446)
PLATELET # BLD AUTO: 96 K/UL (ref 164–446)
PLATELET # BLD AUTO: 97 K/UL (ref 164–446)
PMV BLD AUTO: 11.4 FL (ref 9–12.9)
PMV BLD AUTO: 11.6 FL (ref 9–12.9)
PMV BLD AUTO: 11.9 FL (ref 9–12.9)
PMV BLD AUTO: 12.1 FL (ref 9–12.9)
PMV BLD AUTO: 12.2 FL (ref 9–12.9)
PMV BLD AUTO: 12.2 FL (ref 9–12.9)
PMV BLD AUTO: 12.3 FL (ref 9–12.9)
PMV BLD AUTO: 12.7 FL (ref 9–12.9)
POTASSIUM SERPL-SCNC: 4.2 MMOL/L (ref 3.6–5.5)
POTASSIUM SERPL-SCNC: 4.6 MMOL/L (ref 3.6–5.5)
POTASSIUM SERPL-SCNC: 4.9 MMOL/L (ref 3.6–5.5)
POTASSIUM SERPL-SCNC: 5.1 MMOL/L (ref 3.6–5.5)
POTASSIUM SERPL-SCNC: 5.4 MMOL/L (ref 3.6–5.5)
POTASSIUM SERPL-SCNC: 5.7 MMOL/L (ref 3.6–5.5)
POTASSIUM SERPL-SCNC: 5.8 MMOL/L (ref 3.6–5.5)
POTASSIUM SERPL-SCNC: 5.8 MMOL/L (ref 3.6–5.5)
POTASSIUM SERPL-SCNC: 6.1 MMOL/L (ref 3.6–5.5)
POTASSIUM SERPL-SCNC: 6.2 MMOL/L (ref 3.6–5.5)
POTASSIUM SERPL-SCNC: 6.3 MMOL/L (ref 3.6–5.5)
POTASSIUM SERPL-SCNC: 6.4 MMOL/L (ref 3.6–5.5)
PROPOXYPH UR QL SCN: NEGATIVE
PROT SERPL-MCNC: 5.6 G/DL (ref 6–8.2)
PROT SERPL-MCNC: 5.8 G/DL (ref 6–8.2)
PROT SERPL-MCNC: 6.4 G/DL (ref 6–8.2)
PROT SERPL-MCNC: 6.5 G/DL (ref 6–8.2)
PROT SERPL-MCNC: 6.5 G/DL (ref 6–8.2)
PROT SERPL-MCNC: 6.6 G/DL (ref 6–8.2)
PROT UR QL STRIP: 100 MG/DL
PROT UR QL STRIP: NEGATIVE MG/DL
PSA SERPL DL<=0.01 NG/ML-MCNC: 0.76 NG/ML (ref 0–4)
PSA SERPL-MCNC: 0.69 NG/ML (ref 0–4)
PTH-INTACT SERPL-MCNC: 107.8 PG/ML (ref 14–72)
PTH-INTACT SERPL-MCNC: 95.7 PG/ML (ref 14–72)
RBC # BLD AUTO: 3.05 M/UL (ref 4.7–6.1)
RBC # BLD AUTO: 3.06 M/UL (ref 4.7–6.1)
RBC # BLD AUTO: 3.23 M/UL (ref 4.7–6.1)
RBC # BLD AUTO: 3.38 M/UL (ref 4.7–6.1)
RBC # BLD AUTO: 3.44 M/UL (ref 4.7–6.1)
RBC # BLD AUTO: 3.44 M/UL (ref 4.7–6.1)
RBC # BLD AUTO: 3.49 X10E6/UL (ref 4.14–5.8)
RBC # BLD AUTO: 3.53 M/UL (ref 4.7–6.1)
RBC # BLD AUTO: 3.54 M/UL (ref 4.7–6.1)
RBC # BLD AUTO: 3.56 M/UL (ref 4.7–6.1)
RBC # BLD AUTO: 3.6 M/UL (ref 4.7–6.1)
RBC # BLD AUTO: 3.74 M/UL (ref 4.7–6.1)
RBC # URNS HPF: ABNORMAL /HPF
RBC UR QL AUTO: NEGATIVE
SIGNIFICANT IND 70042: NORMAL
SITE SITE: NORMAL
SODIUM SERPL-SCNC: 134 MMOL/L (ref 135–145)
SODIUM SERPL-SCNC: 135 MMOL/L (ref 135–145)
SODIUM SERPL-SCNC: 135 MMOL/L (ref 135–145)
SODIUM SERPL-SCNC: 137 MMOL/L (ref 135–145)
SODIUM SERPL-SCNC: 138 MMOL/L (ref 135–145)
SODIUM SERPL-SCNC: 138 MMOL/L (ref 135–145)
SODIUM SERPL-SCNC: 139 MMOL/L (ref 135–145)
SODIUM SERPL-SCNC: 140 MMOL/L (ref 135–145)
SODIUM SERPL-SCNC: 141 MMOL/L (ref 135–145)
SODIUM SERPL-SCNC: 145 MMOL/L (ref 135–145)
SOURCE SOURCE: NORMAL
SP GR UR STRIP.AUTO: 1.01
SP GR UR STRIP.AUTO: 1.02
SP GR UR STRIP.AUTO: 1.02
TIBC SERPL-MCNC: 234 UG/DL (ref 250–450)
TIBC SERPL-MCNC: 312 UG/DL (ref 250–450)
TRANSFERRIN SERPL-MCNC: 169 MG/DL (ref 200–370)
TRIGL SERPL-MCNC: 77 MG/DL (ref 0–149)
TROPONIN I SERPL-MCNC: 0.01 NG/ML (ref 0–0.04)
TROPONIN I SERPL-MCNC: 0.02 NG/ML (ref 0–0.04)
TROPONIN I SERPL-MCNC: 0.06 NG/ML (ref 0–0.04)
TROPONIN I SERPL-MCNC: 0.09 NG/ML (ref 0–0.04)
TROPONIN I SERPL-MCNC: 0.11 NG/ML (ref 0–0.04)
TROPONIN I SERPL-MCNC: 0.15 NG/ML (ref 0–0.04)
TROPONIN I SERPL-MCNC: <0.01 NG/ML (ref 0–0.04)
TSH SERPL DL<=0.005 MIU/L-ACNC: 21.01 UIU/ML (ref 0.45–4.5)
TSH SERPL DL<=0.005 MIU/L-ACNC: 26.42 UIU/ML (ref 0.3–3.7)
TSH SERPL DL<=0.005 MIU/L-ACNC: 33.13 UIU/ML (ref 0.3–3.7)
URATE SERPL-MCNC: 6.9 MG/DL (ref 2.5–8.3)
UROBILINOGEN UR STRIP.AUTO-MCNC: 0.2 MG/DL
UROBILINOGEN UR STRIP.AUTO-MCNC: 0.2 MG/DL
VIT B12 SERPL-MCNC: 325 PG/ML (ref 211–911)
VIT B12 SERPL-MCNC: 571 PG/ML (ref 211–911)
WBC # BLD AUTO: 4.3 K/UL (ref 4.8–10.8)
WBC # BLD AUTO: 4.5 K/UL (ref 4.8–10.8)
WBC # BLD AUTO: 4.6 K/UL (ref 4.8–10.8)
WBC # BLD AUTO: 4.7 X10E3/UL (ref 3.4–10.8)
WBC # BLD AUTO: 5 K/UL (ref 4.8–10.8)
WBC # BLD AUTO: 5.1 K/UL (ref 4.8–10.8)
WBC # BLD AUTO: 5.6 K/UL (ref 4.8–10.8)
WBC # BLD AUTO: 6.2 K/UL (ref 4.8–10.8)
WBC # BLD AUTO: 6.8 K/UL (ref 4.8–10.8)
WBC # BLD AUTO: 7.6 K/UL (ref 4.8–10.8)
WBC #/AREA URNS HPF: ABNORMAL /HPF
YEAST BUDDING URNS QL: PRESENT /HPF

## 2017-01-01 PROCEDURE — 71010 DX-CHEST-PORTABLE (1 VIEW): CPT

## 2017-01-01 PROCEDURE — 700102 HCHG RX REV CODE 250 W/ 637 OVERRIDE(OP): Performed by: HOSPITALIST

## 2017-01-01 PROCEDURE — A9270 NON-COVERED ITEM OR SERVICE: HCPCS | Performed by: HOSPITALIST

## 2017-01-01 PROCEDURE — G8997 SWALLOW GOAL STATUS: HCPCS | Mod: CI

## 2017-01-01 PROCEDURE — 1036F TOBACCO NON-USER: CPT | Performed by: PSYCHIATRY & NEUROLOGY

## 2017-01-01 PROCEDURE — 700102 HCHG RX REV CODE 250 W/ 637 OVERRIDE(OP): Performed by: EMERGENCY MEDICINE

## 2017-01-01 PROCEDURE — 4040F PNEUMOC VAC/ADMIN/RCVD: CPT | Performed by: INTERNAL MEDICINE

## 2017-01-01 PROCEDURE — 85025 COMPLETE CBC W/AUTO DIFF WBC: CPT

## 2017-01-01 PROCEDURE — G8420 CALC BMI NORM PARAMETERS: HCPCS | Performed by: PHYSICIAN ASSISTANT

## 2017-01-01 PROCEDURE — 82728 ASSAY OF FERRITIN: CPT

## 2017-01-01 PROCEDURE — 700111 HCHG RX REV CODE 636 W/ 250 OVERRIDE (IP): Performed by: HOSPITALIST

## 2017-01-01 PROCEDURE — 99239 HOSP IP/OBS DSCHRG MGMT >30: CPT | Performed by: HOSPITALIST

## 2017-01-01 PROCEDURE — 85027 COMPLETE CBC AUTOMATED: CPT

## 2017-01-01 PROCEDURE — 99213 OFFICE O/P EST LOW 20 MIN: CPT | Performed by: INTERNAL MEDICINE

## 2017-01-01 PROCEDURE — 99214 OFFICE O/P EST MOD 30 MIN: CPT | Performed by: PHYSICIAN ASSISTANT

## 2017-01-01 PROCEDURE — 84484 ASSAY OF TROPONIN QUANT: CPT

## 2017-01-01 PROCEDURE — 81001 URINALYSIS AUTO W/SCOPE: CPT

## 2017-01-01 PROCEDURE — 99232 SBSQ HOSP IP/OBS MODERATE 35: CPT | Performed by: HOSPITALIST

## 2017-01-01 PROCEDURE — 36415 COLL VENOUS BLD VENIPUNCTURE: CPT

## 2017-01-01 PROCEDURE — 87086 URINE CULTURE/COLONY COUNT: CPT

## 2017-01-01 PROCEDURE — 0518F FALL PLAN OF CARE DOCD: CPT | Mod: 8P | Performed by: PHYSICIAN ASSISTANT

## 2017-01-01 PROCEDURE — G8987 SELF CARE CURRENT STATUS: HCPCS | Mod: CK

## 2017-01-01 PROCEDURE — 83970 ASSAY OF PARATHORMONE: CPT

## 2017-01-01 PROCEDURE — 84466 ASSAY OF TRANSFERRIN: CPT

## 2017-01-01 PROCEDURE — 96361 HYDRATE IV INFUSION ADD-ON: CPT

## 2017-01-01 PROCEDURE — 0518F FALL PLAN OF CARE DOCD: CPT | Mod: 8P | Performed by: INTERNAL MEDICINE

## 2017-01-01 PROCEDURE — G8420 CALC BMI NORM PARAMETERS: HCPCS | Performed by: PSYCHIATRY & NEUROLOGY

## 2017-01-01 PROCEDURE — 700102 HCHG RX REV CODE 250 W/ 637 OVERRIDE(OP): Performed by: INTERNAL MEDICINE

## 2017-01-01 PROCEDURE — 83880 ASSAY OF NATRIURETIC PEPTIDE: CPT

## 2017-01-01 PROCEDURE — G8432 DEP SCR NOT DOC, RNG: HCPCS | Performed by: INTERNAL MEDICINE

## 2017-01-01 PROCEDURE — 99214 OFFICE O/P EST MOD 30 MIN: CPT | Performed by: INTERNAL MEDICINE

## 2017-01-01 PROCEDURE — 1100F PTFALLS ASSESS-DOCD GE2>/YR: CPT | Performed by: PHYSICIAN ASSISTANT

## 2017-01-01 PROCEDURE — 84484 ASSAY OF TROPONIN QUANT: CPT | Mod: 91

## 2017-01-01 PROCEDURE — 99285 EMERGENCY DEPT VISIT HI MDM: CPT

## 2017-01-01 PROCEDURE — 1036F TOBACCO NON-USER: CPT | Performed by: INTERNAL MEDICINE

## 2017-01-01 PROCEDURE — 80053 COMPREHEN METABOLIC PANEL: CPT

## 2017-01-01 PROCEDURE — 93005 ELECTROCARDIOGRAM TRACING: CPT | Performed by: EMERGENCY MEDICINE

## 2017-01-01 PROCEDURE — A9270 NON-COVERED ITEM OR SERVICE: HCPCS | Performed by: INTERNAL MEDICINE

## 2017-01-01 PROCEDURE — 700105 HCHG RX REV CODE 258: Performed by: EMERGENCY MEDICINE

## 2017-01-01 PROCEDURE — 99223 1ST HOSP IP/OBS HIGH 75: CPT | Mod: AI | Performed by: HOSPITALIST

## 2017-01-01 PROCEDURE — G8432 DEP SCR NOT DOC, RNG: HCPCS | Performed by: PHYSICIAN ASSISTANT

## 2017-01-01 PROCEDURE — G8432 DEP SCR NOT DOC, RNG: HCPCS | Performed by: PSYCHIATRY & NEUROLOGY

## 2017-01-01 PROCEDURE — G8598 ASA/ANTIPLAT THER USED: HCPCS | Performed by: PHYSICIAN ASSISTANT

## 2017-01-01 PROCEDURE — 70547 MR ANGIOGRAPHY NECK W/O DYE: CPT

## 2017-01-01 PROCEDURE — 99213 OFFICE O/P EST LOW 20 MIN: CPT | Performed by: PSYCHIATRY & NEUROLOGY

## 2017-01-01 PROCEDURE — 70450 CT HEAD/BRAIN W/O DYE: CPT

## 2017-01-01 PROCEDURE — 3288F FALL RISK ASSESSMENT DOCD: CPT | Performed by: PSYCHIATRY & NEUROLOGY

## 2017-01-01 PROCEDURE — 4040F PNEUMOC VAC/ADMIN/RCVD: CPT | Performed by: PSYCHIATRY & NEUROLOGY

## 2017-01-01 PROCEDURE — 87040 BLOOD CULTURE FOR BACTERIA: CPT

## 2017-01-01 PROCEDURE — 84153 ASSAY OF PSA TOTAL: CPT

## 2017-01-01 PROCEDURE — 80307 DRUG TEST PRSMV CHEM ANLYZR: CPT

## 2017-01-01 PROCEDURE — A9270 NON-COVERED ITEM OR SERVICE: HCPCS | Performed by: EMERGENCY MEDICINE

## 2017-01-01 PROCEDURE — 99284 EMERGENCY DEPT VISIT MOD MDM: CPT

## 2017-01-01 PROCEDURE — 99205 OFFICE O/P NEW HI 60 MIN: CPT | Performed by: PSYCHIATRY & NEUROLOGY

## 2017-01-01 PROCEDURE — 93880 EXTRACRANIAL BILAT STUDY: CPT

## 2017-01-01 PROCEDURE — 83550 IRON BINDING TEST: CPT

## 2017-01-01 PROCEDURE — 700105 HCHG RX REV CODE 258: Performed by: HOSPITALIST

## 2017-01-01 PROCEDURE — 3288F FALL RISK ASSESSMENT DOCD: CPT | Performed by: PHYSICIAN ASSISTANT

## 2017-01-01 PROCEDURE — 90732 PPSV23 VACC 2 YRS+ SUBQ/IM: CPT | Performed by: FAMILY MEDICINE

## 2017-01-01 PROCEDURE — 3288F FALL RISK ASSESSMENT DOCD: CPT | Performed by: INTERNAL MEDICINE

## 2017-01-01 PROCEDURE — 80048 BASIC METABOLIC PNL TOTAL CA: CPT

## 2017-01-01 PROCEDURE — 99223 1ST HOSP IP/OBS HIGH 75: CPT | Mod: AI | Performed by: INTERNAL MEDICINE

## 2017-01-01 PROCEDURE — 97161 PT EVAL LOW COMPLEX 20 MIN: CPT

## 2017-01-01 PROCEDURE — G8420 CALC BMI NORM PARAMETERS: HCPCS | Performed by: INTERNAL MEDICINE

## 2017-01-01 PROCEDURE — 99212 OFFICE O/P EST SF 10 MIN: CPT | Performed by: PHYSICIAN ASSISTANT

## 2017-01-01 PROCEDURE — 770020 HCHG ROOM/CARE - TELE (206)

## 2017-01-01 PROCEDURE — 4040F PNEUMOC VAC/ADMIN/RCVD: CPT | Performed by: PHYSICIAN ASSISTANT

## 2017-01-01 PROCEDURE — G0009 ADMIN PNEUMOCOCCAL VACCINE: HCPCS | Performed by: FAMILY MEDICINE

## 2017-01-01 PROCEDURE — 1100F PTFALLS ASSESS-DOCD GE2>/YR: CPT | Performed by: INTERNAL MEDICINE

## 2017-01-01 PROCEDURE — 81003 URINALYSIS AUTO W/O SCOPE: CPT

## 2017-01-01 PROCEDURE — 96360 HYDRATION IV INFUSION INIT: CPT

## 2017-01-01 PROCEDURE — 80061 LIPID PANEL: CPT

## 2017-01-01 PROCEDURE — G8979 MOBILITY GOAL STATUS: HCPCS | Mod: CI

## 2017-01-01 PROCEDURE — 83540 ASSAY OF IRON: CPT

## 2017-01-01 PROCEDURE — 1100F PTFALLS ASSESS-DOCD GE2>/YR: CPT | Performed by: PSYCHIATRY & NEUROLOGY

## 2017-01-01 PROCEDURE — 51798 US URINE CAPACITY MEASURE: CPT

## 2017-01-01 PROCEDURE — 83605 ASSAY OF LACTIC ACID: CPT

## 2017-01-01 PROCEDURE — 96375 TX/PRO/DX INJ NEW DRUG ADDON: CPT

## 2017-01-01 PROCEDURE — G8482 FLU IMMUNIZE ORDER/ADMIN: HCPCS | Performed by: PSYCHIATRY & NEUROLOGY

## 2017-01-01 PROCEDURE — 0518F FALL PLAN OF CARE DOCD: CPT | Mod: 8P | Performed by: PSYCHIATRY & NEUROLOGY

## 2017-01-01 PROCEDURE — 93005 ELECTROCARDIOGRAM TRACING: CPT | Performed by: INTERNAL MEDICINE

## 2017-01-01 PROCEDURE — 700101 HCHG RX REV CODE 250: Performed by: EMERGENCY MEDICINE

## 2017-01-01 PROCEDURE — 1036F TOBACCO NON-USER: CPT | Performed by: PHYSICIAN ASSISTANT

## 2017-01-01 PROCEDURE — 82306 VITAMIN D 25 HYDROXY: CPT

## 2017-01-01 PROCEDURE — 99214 OFFICE O/P EST MOD 30 MIN: CPT | Performed by: FAMILY MEDICINE

## 2017-01-01 PROCEDURE — 94760 N-INVAS EAR/PLS OXIMETRY 1: CPT

## 2017-01-01 PROCEDURE — 84443 ASSAY THYROID STIM HORMONE: CPT

## 2017-01-01 PROCEDURE — 82607 VITAMIN B-12: CPT

## 2017-01-01 PROCEDURE — 93306 TTE W/DOPPLER COMPLETE: CPT

## 2017-01-01 PROCEDURE — 700111 HCHG RX REV CODE 636 W/ 250 OVERRIDE (IP): Performed by: EMERGENCY MEDICINE

## 2017-01-01 PROCEDURE — 700105 HCHG RX REV CODE 258: Performed by: INTERNAL MEDICINE

## 2017-01-01 PROCEDURE — G8598 ASA/ANTIPLAT THER USED: HCPCS | Performed by: PSYCHIATRY & NEUROLOGY

## 2017-01-01 PROCEDURE — 93005 ELECTROCARDIOGRAM TRACING: CPT

## 2017-01-01 PROCEDURE — 93010 ELECTROCARDIOGRAM REPORT: CPT | Performed by: INTERNAL MEDICINE

## 2017-01-01 PROCEDURE — 97112 NEUROMUSCULAR REEDUCATION: CPT

## 2017-01-01 PROCEDURE — 97530 THERAPEUTIC ACTIVITIES: CPT

## 2017-01-01 PROCEDURE — 96365 THER/PROPH/DIAG IV INF INIT: CPT

## 2017-01-01 PROCEDURE — 700101 HCHG RX REV CODE 250: Performed by: HOSPITALIST

## 2017-01-01 PROCEDURE — G8988 SELF CARE GOAL STATUS: HCPCS | Mod: CI

## 2017-01-01 PROCEDURE — 97166 OT EVAL MOD COMPLEX 45 MIN: CPT

## 2017-01-01 PROCEDURE — G8598 ASA/ANTIPLAT THER USED: HCPCS | Performed by: INTERNAL MEDICINE

## 2017-01-01 PROCEDURE — 92526 ORAL FUNCTION THERAPY: CPT

## 2017-01-01 PROCEDURE — 82570 ASSAY OF URINE CREATININE: CPT

## 2017-01-01 PROCEDURE — 95951 PR EEG MONITORING/VIDEORECORD: CPT | Mod: 52 | Performed by: PSYCHIATRY & NEUROLOGY

## 2017-01-01 PROCEDURE — 82043 UR ALBUMIN QUANTITATIVE: CPT

## 2017-01-01 PROCEDURE — 84550 ASSAY OF BLOOD/URIC ACID: CPT

## 2017-01-01 PROCEDURE — G8996 SWALLOW CURRENT STATUS: HCPCS | Mod: CJ

## 2017-01-01 PROCEDURE — 93306 TTE W/DOPPLER COMPLETE: CPT | Mod: 26 | Performed by: INTERNAL MEDICINE

## 2017-01-01 PROCEDURE — 96367 TX/PROPH/DG ADDL SEQ IV INF: CPT

## 2017-01-01 PROCEDURE — 92610 EVALUATE SWALLOWING FUNCTION: CPT

## 2017-01-01 PROCEDURE — G8419 CALC BMI OUT NRM PARAM NOF/U: HCPCS | Performed by: INTERNAL MEDICINE

## 2017-01-01 PROCEDURE — 73130 X-RAY EXAM OF HAND: CPT | Mod: LT

## 2017-01-01 PROCEDURE — G8978 MOBILITY CURRENT STATUS: HCPCS | Mod: CJ

## 2017-01-01 PROCEDURE — 97116 GAIT TRAINING THERAPY: CPT

## 2017-01-01 PROCEDURE — 71010 DX-CHEST-LIMITED (1 VIEW): CPT

## 2017-01-01 PROCEDURE — 99214 OFFICE O/P EST MOD 30 MIN: CPT | Mod: 25 | Performed by: FAMILY MEDICINE

## 2017-01-01 PROCEDURE — 70544 MR ANGIOGRAPHY HEAD W/O DYE: CPT

## 2017-01-01 PROCEDURE — 83735 ASSAY OF MAGNESIUM: CPT

## 2017-01-01 RX ORDER — TERAZOSIN 2 MG/1
2 CAPSULE ORAL
Qty: 90 CAP | Refills: 3 | Status: ON HOLD | OUTPATIENT
Start: 2017-01-01 | End: 2018-01-01

## 2017-01-01 RX ORDER — TAMSULOSIN HYDROCHLORIDE 0.4 MG/1
0.4 CAPSULE ORAL
Status: DISCONTINUED | OUTPATIENT
Start: 2017-01-01 | End: 2017-01-01 | Stop reason: HOSPADM

## 2017-01-01 RX ORDER — RANOLAZINE 500 MG/1
500 TABLET, FILM COATED, EXTENDED RELEASE ORAL 2 TIMES DAILY
Qty: 21 TAB | Refills: 0 | COMMUNITY
Start: 2017-01-01 | End: 2017-01-01 | Stop reason: SDUPTHER

## 2017-01-01 RX ORDER — SODIUM CHLORIDE 9 MG/ML
1000 INJECTION, SOLUTION INTRAVENOUS ONCE
Status: COMPLETED | OUTPATIENT
Start: 2017-01-01 | End: 2017-01-01

## 2017-01-01 RX ORDER — AMLODIPINE BESYLATE 5 MG/1
2.5 TABLET ORAL DAILY
Status: DISCONTINUED | OUTPATIENT
Start: 2017-01-01 | End: 2017-01-01

## 2017-01-01 RX ORDER — ATORVASTATIN CALCIUM 20 MG/1
20 TABLET, FILM COATED ORAL DAILY
Qty: 90 TAB | Refills: 0 | Status: SHIPPED | OUTPATIENT
Start: 2017-01-01 | End: 2017-01-01 | Stop reason: SDUPTHER

## 2017-01-01 RX ORDER — AMLODIPINE BESYLATE 5 MG/1
5 TABLET ORAL DAILY
Qty: 30 TAB | Refills: 6 | Status: SHIPPED | OUTPATIENT
Start: 2017-01-01 | End: 2017-01-01

## 2017-01-01 RX ORDER — LACTULOSE 20 G/30ML
30 SOLUTION ORAL ONCE
Status: COMPLETED | OUTPATIENT
Start: 2017-01-01 | End: 2017-01-01

## 2017-01-01 RX ORDER — LEVOTHYROXINE SODIUM 0.15 MG/1
150 TABLET ORAL
Qty: 90 TAB | Refills: 3 | Status: SHIPPED
Start: 2017-01-01 | End: 2017-01-01 | Stop reason: SDUPTHER

## 2017-01-01 RX ORDER — RANOLAZINE 500 MG/1
500 TABLET, EXTENDED RELEASE ORAL 2 TIMES DAILY
Status: DISCONTINUED | OUTPATIENT
Start: 2017-01-01 | End: 2017-01-01 | Stop reason: HOSPADM

## 2017-01-01 RX ORDER — CLOPIDOGREL BISULFATE 75 MG/1
75 TABLET ORAL
Status: DISCONTINUED | OUTPATIENT
Start: 2017-01-01 | End: 2017-01-01 | Stop reason: HOSPADM

## 2017-01-01 RX ORDER — SODIUM CHLORIDE 9 MG/ML
500 INJECTION, SOLUTION INTRAVENOUS ONCE
Status: COMPLETED | OUTPATIENT
Start: 2017-01-01 | End: 2017-01-01

## 2017-01-01 RX ORDER — LEVOTHYROXINE SODIUM 175 UG/1
175 TABLET ORAL
Qty: 90 TAB | Refills: 3 | Status: CANCELLED | OUTPATIENT
Start: 2017-01-01

## 2017-01-01 RX ORDER — FUROSEMIDE 40 MG/1
20 TABLET ORAL ONCE
Status: COMPLETED | OUTPATIENT
Start: 2017-01-01 | End: 2017-01-01

## 2017-01-01 RX ORDER — ATORVASTATIN CALCIUM 20 MG/1
20 TABLET, FILM COATED ORAL DAILY
Status: DISCONTINUED | OUTPATIENT
Start: 2017-01-01 | End: 2017-01-01 | Stop reason: HOSPADM

## 2017-01-01 RX ORDER — AMLODIPINE BESYLATE 5 MG/1
5 TABLET ORAL DAILY
COMMUNITY
End: 2018-01-01 | Stop reason: SDUPTHER

## 2017-01-01 RX ORDER — BISACODYL 10 MG
10 SUPPOSITORY, RECTAL RECTAL
Status: DISCONTINUED | OUTPATIENT
Start: 2017-01-01 | End: 2017-01-01 | Stop reason: HOSPADM

## 2017-01-01 RX ORDER — HEPARIN SODIUM 5000 [USP'U]/ML
5000 INJECTION, SOLUTION INTRAVENOUS; SUBCUTANEOUS EVERY 8 HOURS
Status: DISCONTINUED | OUTPATIENT
Start: 2017-01-01 | End: 2017-01-01 | Stop reason: HOSPADM

## 2017-01-01 RX ORDER — CARVEDILOL 12.5 MG/1
12.5 TABLET ORAL 2 TIMES DAILY
COMMUNITY
Start: 2017-01-01 | End: 2017-01-01 | Stop reason: SDUPTHER

## 2017-01-01 RX ORDER — ALLOPURINOL 100 MG/1
200 TABLET ORAL DAILY
Qty: 180 TAB | Refills: 3 | Status: SHIPPED | OUTPATIENT
Start: 2017-01-01 | End: 2017-01-01

## 2017-01-01 RX ORDER — AMLODIPINE BESYLATE 5 MG/1
5 TABLET ORAL DAILY
Qty: 30 TAB | Refills: 0 | Status: SHIPPED | OUTPATIENT
Start: 2017-01-01 | End: 2017-01-01 | Stop reason: SDUPTHER

## 2017-01-01 RX ORDER — SODIUM POLYSTYRENE SULFONATE 15 G/60ML
30 SUSPENSION ORAL; RECTAL ONCE
Status: COMPLETED | OUTPATIENT
Start: 2017-01-01 | End: 2017-01-01

## 2017-01-01 RX ORDER — HALOPERIDOL 5 MG/ML
1-5 INJECTION INTRAMUSCULAR EVERY 4 HOURS PRN
Status: DISCONTINUED | OUTPATIENT
Start: 2017-01-01 | End: 2017-01-01 | Stop reason: HOSPADM

## 2017-01-01 RX ORDER — SODIUM POLYSTYRENE SULFONATE 15 G/60ML
15 SUSPENSION ORAL; RECTAL ONCE
Status: COMPLETED | OUTPATIENT
Start: 2017-01-01 | End: 2017-01-01

## 2017-01-01 RX ORDER — CLOPIDOGREL BISULFATE 75 MG/1
75 TABLET ORAL DAILY
Status: DISCONTINUED | OUTPATIENT
Start: 2017-01-01 | End: 2017-01-01 | Stop reason: HOSPADM

## 2017-01-01 RX ORDER — AMLODIPINE BESYLATE 5 MG/1
5 TABLET ORAL DAILY
Status: DISCONTINUED | OUTPATIENT
Start: 2017-01-01 | End: 2017-01-01 | Stop reason: HOSPADM

## 2017-01-01 RX ORDER — LEVOTHYROXINE SODIUM 137 UG/1
137 TABLET ORAL
Qty: 90 TAB | Refills: 2 | Status: SHIPPED
Start: 2017-01-01 | End: 2017-01-01 | Stop reason: SDUPTHER

## 2017-01-01 RX ORDER — CARVEDILOL 12.5 MG/1
12.5 TABLET ORAL 2 TIMES DAILY
Qty: 180 TAB | Refills: 0 | Status: SHIPPED | OUTPATIENT
Start: 2017-01-01 | End: 2017-01-01 | Stop reason: SDUPTHER

## 2017-01-01 RX ORDER — POLYETHYLENE GLYCOL 3350 17 G/17G
1 POWDER, FOR SOLUTION ORAL
Status: DISCONTINUED | OUTPATIENT
Start: 2017-01-01 | End: 2017-01-01 | Stop reason: HOSPADM

## 2017-01-01 RX ORDER — ONDANSETRON 2 MG/ML
4 INJECTION INTRAMUSCULAR; INTRAVENOUS EVERY 4 HOURS PRN
Status: DISCONTINUED | OUTPATIENT
Start: 2017-01-01 | End: 2017-01-01 | Stop reason: HOSPADM

## 2017-01-01 RX ORDER — ISOSORBIDE MONONITRATE 30 MG/1
60 TABLET, EXTENDED RELEASE ORAL DAILY
Status: DISCONTINUED | OUTPATIENT
Start: 2017-01-01 | End: 2017-01-01

## 2017-01-01 RX ORDER — DOCUSATE SODIUM 100 MG/1
100 CAPSULE, LIQUID FILLED ORAL DAILY
Qty: 30 CAP | Refills: 6 | Status: SHIPPED
Start: 2017-01-01 | End: 2018-01-01 | Stop reason: SDUPTHER

## 2017-01-01 RX ORDER — MORPHINE SULFATE 4 MG/ML
2 INJECTION, SOLUTION INTRAMUSCULAR; INTRAVENOUS
Status: DISCONTINUED | OUTPATIENT
Start: 2017-01-01 | End: 2017-01-01 | Stop reason: HOSPADM

## 2017-01-01 RX ORDER — SODIUM CHLORIDE 9 MG/ML
INJECTION, SOLUTION INTRAVENOUS
Status: ACTIVE
Start: 2017-01-01 | End: 2017-01-01

## 2017-01-01 RX ORDER — DEXTROSE MONOHYDRATE 25 G/50ML
25 INJECTION, SOLUTION INTRAVENOUS ONCE
Status: COMPLETED | OUTPATIENT
Start: 2017-01-01 | End: 2017-01-01

## 2017-01-01 RX ORDER — AMOXICILLIN 250 MG
2 CAPSULE ORAL 2 TIMES DAILY
Status: DISCONTINUED | OUTPATIENT
Start: 2017-01-01 | End: 2017-01-01 | Stop reason: HOSPADM

## 2017-01-01 RX ORDER — LEVOTHYROXINE SODIUM 0.07 MG/1
150 TABLET ORAL
Status: DISCONTINUED | OUTPATIENT
Start: 2017-01-01 | End: 2017-01-01 | Stop reason: HOSPADM

## 2017-01-01 RX ORDER — LEVOTHYROXINE SODIUM 137 UG/1
137 TABLET ORAL
Status: DISCONTINUED | OUTPATIENT
Start: 2017-01-01 | End: 2017-01-01 | Stop reason: HOSPADM

## 2017-01-01 RX ORDER — IRBESARTAN 150 MG/1
TABLET ORAL
Qty: 90 TAB | Refills: 2 | Status: SHIPPED | OUTPATIENT
Start: 2017-01-01 | End: 2017-01-01

## 2017-01-01 RX ORDER — AMOXICILLIN 250 MG
2 CAPSULE ORAL 2 TIMES DAILY
Status: DISCONTINUED | OUTPATIENT
Start: 2017-01-01 | End: 2017-01-01

## 2017-01-01 RX ORDER — CIPROFLOXACIN 500 MG/1
500 TABLET, FILM COATED ORAL DAILY
Qty: 5 TAB | Refills: 0 | Status: SHIPPED | OUTPATIENT
Start: 2017-01-01 | End: 2017-01-01

## 2017-01-01 RX ORDER — OXYCODONE HYDROCHLORIDE 5 MG/1
5 TABLET ORAL
Status: DISCONTINUED | OUTPATIENT
Start: 2017-01-01 | End: 2017-01-01 | Stop reason: HOSPADM

## 2017-01-01 RX ORDER — AZITHROMYCIN 500 MG/1
500 INJECTION, POWDER, LYOPHILIZED, FOR SOLUTION INTRAVENOUS ONCE
Status: COMPLETED | OUTPATIENT
Start: 2017-01-01 | End: 2017-01-01

## 2017-01-01 RX ORDER — ATORVASTATIN CALCIUM 20 MG/1
20 TABLET, FILM COATED ORAL DAILY
Qty: 90 TAB | Refills: 2 | Status: ON HOLD | OUTPATIENT
Start: 2017-01-01 | End: 2018-01-01

## 2017-01-01 RX ORDER — ATORVASTATIN CALCIUM 20 MG/1
20 TABLET, FILM COATED ORAL
Status: DISCONTINUED | OUTPATIENT
Start: 2017-01-01 | End: 2017-01-01 | Stop reason: HOSPADM

## 2017-01-01 RX ORDER — CLOPIDOGREL BISULFATE 75 MG/1
75 TABLET ORAL
Qty: 90 TAB | Refills: 3 | Status: ON HOLD | OUTPATIENT
Start: 2017-01-01 | End: 2018-01-01

## 2017-01-01 RX ORDER — ERGOCALCIFEROL 1.25 MG/1
50000 CAPSULE ORAL
Qty: 6 CAP | Refills: 0
Start: 2017-01-01 | End: 2017-01-01

## 2017-01-01 RX ORDER — LEVOTHYROXINE SODIUM 175 UG/1
175 TABLET ORAL
Qty: 90 TAB | Refills: 3 | Status: ON HOLD
Start: 2017-01-01 | End: 2018-01-01

## 2017-01-01 RX ORDER — HEPARIN SODIUM 5000 [USP'U]/ML
5000 INJECTION, SOLUTION INTRAVENOUS; SUBCUTANEOUS EVERY 8 HOURS
Refills: 0
Start: 2017-01-01 | End: 2017-01-01

## 2017-01-01 RX ORDER — HALOPERIDOL 5 MG/ML
5 INJECTION INTRAMUSCULAR EVERY 4 HOURS PRN
Status: DISCONTINUED | OUTPATIENT
Start: 2017-01-01 | End: 2017-01-01 | Stop reason: HOSPADM

## 2017-01-01 RX ORDER — AMPICILLIN AND SULBACTAM 2; 1 G/1; G/1
3 INJECTION, POWDER, FOR SOLUTION INTRAMUSCULAR; INTRAVENOUS ONCE
Status: COMPLETED | OUTPATIENT
Start: 2017-01-01 | End: 2017-01-01

## 2017-01-01 RX ORDER — ACETAMINOPHEN 325 MG/1
650 TABLET ORAL EVERY 6 HOURS PRN
Qty: 30 TAB | Refills: 0
Start: 2017-01-01 | End: 2017-01-01

## 2017-01-01 RX ORDER — RANOLAZINE 500 MG/1
500 TABLET, FILM COATED, EXTENDED RELEASE ORAL 2 TIMES DAILY
Qty: 56 TAB | Refills: 0 | Status: SHIPPED | OUTPATIENT
Start: 2017-01-01 | End: 2017-01-01 | Stop reason: SDUPTHER

## 2017-01-01 RX ORDER — LEVOTHYROXINE SODIUM 137 UG/1
137 TABLET ORAL
Qty: 90 TAB | Refills: 2 | Status: SHIPPED | OUTPATIENT
Start: 2017-01-01 | End: 2017-01-01 | Stop reason: SDUPTHER

## 2017-01-01 RX ORDER — CARVEDILOL 12.5 MG/1
12.5 TABLET ORAL 2 TIMES DAILY WITH MEALS
Status: DISCONTINUED | OUTPATIENT
Start: 2017-01-01 | End: 2017-01-01 | Stop reason: HOSPADM

## 2017-01-01 RX ORDER — LABETALOL HYDROCHLORIDE 5 MG/ML
10 INJECTION, SOLUTION INTRAVENOUS EVERY 4 HOURS PRN
Status: DISCONTINUED | OUTPATIENT
Start: 2017-01-01 | End: 2017-01-01 | Stop reason: HOSPADM

## 2017-01-01 RX ORDER — OXYCODONE HYDROCHLORIDE 5 MG/1
2.5 TABLET ORAL
Status: DISCONTINUED | OUTPATIENT
Start: 2017-01-01 | End: 2017-01-01 | Stop reason: HOSPADM

## 2017-01-01 RX ORDER — ACETAMINOPHEN 325 MG/1
650 TABLET ORAL EVERY 6 HOURS PRN
Status: DISCONTINUED | OUTPATIENT
Start: 2017-01-01 | End: 2017-01-01 | Stop reason: HOSPADM

## 2017-01-01 RX ORDER — LACTOSE-REDUCED FOOD 0.06G-1/ML
1 LIQUID (ML) ORAL 2 TIMES DAILY
Qty: 60 CAN | Refills: 11 | Status: SHIPPED
Start: 2017-01-01 | End: 2018-01-01

## 2017-01-01 RX ORDER — LOSARTAN POTASSIUM 50 MG/1
50 TABLET ORAL
Qty: 30 TAB | Refills: 0 | Status: CANCELLED | OUTPATIENT
Start: 2017-01-01

## 2017-01-01 RX ORDER — SODIUM CHLORIDE 9 MG/ML
INJECTION, SOLUTION INTRAVENOUS CONTINUOUS
Status: DISCONTINUED | OUTPATIENT
Start: 2017-01-01 | End: 2017-01-01 | Stop reason: HOSPADM

## 2017-01-01 RX ORDER — IRBESARTAN 150 MG/1
150 TABLET ORAL
Status: DISCONTINUED | OUTPATIENT
Start: 2017-01-01 | End: 2017-01-01 | Stop reason: HOSPADM

## 2017-01-01 RX ORDER — AMLODIPINE BESYLATE 2.5 MG/1
2.5 TABLET ORAL DAILY
Qty: 90 TAB | Refills: 2 | Status: ON HOLD | OUTPATIENT
Start: 2017-01-01 | End: 2017-01-01

## 2017-01-01 RX ORDER — CYANOCOBALAMIN 1000 UG/ML
1000 INJECTION, SOLUTION INTRAMUSCULAR; SUBCUTANEOUS ONCE
Status: COMPLETED | OUTPATIENT
Start: 2017-01-01 | End: 2017-01-01

## 2017-01-01 RX ORDER — LOSARTAN POTASSIUM 50 MG/1
50 TABLET ORAL
Qty: 30 TAB | Refills: 0 | Status: SHIPPED | OUTPATIENT
Start: 2017-01-01 | End: 2017-01-01

## 2017-01-01 RX ORDER — AMOXICILLIN 250 MG
2 CAPSULE ORAL 2 TIMES DAILY
Qty: 30 TAB | Refills: 0
Start: 2017-01-01 | End: 2017-01-01

## 2017-01-01 RX ORDER — CARVEDILOL 12.5 MG/1
TABLET ORAL
Qty: 180 TAB | Refills: 0 | Status: ON HOLD | OUTPATIENT
Start: 2017-01-01 | End: 2018-01-01

## 2017-01-01 RX ORDER — MECLIZINE HYDROCHLORIDE 25 MG/1
25 TABLET ORAL EVERY 6 HOURS PRN
Qty: 20 TAB | Refills: 0 | Status: SHIPPED | OUTPATIENT
Start: 2017-01-01 | End: 2017-01-01

## 2017-01-01 RX ORDER — TERAZOSIN 2 MG/1
2 CAPSULE ORAL DAILY
Status: DISCONTINUED | OUTPATIENT
Start: 2017-01-01 | End: 2017-01-01 | Stop reason: HOSPADM

## 2017-01-01 RX ORDER — CIPROFLOXACIN 500 MG/1
500 TABLET, FILM COATED ORAL EVERY 12 HOURS
Status: DISCONTINUED | OUTPATIENT
Start: 2017-01-01 | End: 2017-01-01 | Stop reason: HOSPADM

## 2017-01-01 RX ORDER — RANOLAZINE 500 MG/1
500 TABLET, FILM COATED, EXTENDED RELEASE ORAL 2 TIMES DAILY
Qty: 60 TAB | Refills: 6 | Status: ON HOLD | OUTPATIENT
Start: 2017-01-01 | End: 2018-01-01

## 2017-01-01 RX ORDER — SODIUM CHLORIDE 9 MG/ML
INJECTION, SOLUTION INTRAVENOUS CONTINUOUS
Status: DISCONTINUED | OUTPATIENT
Start: 2017-01-01 | End: 2017-01-01

## 2017-01-01 RX ORDER — ERGOCALCIFEROL 1.25 MG/1
50000 CAPSULE ORAL
Status: DISCONTINUED | OUTPATIENT
Start: 2017-01-01 | End: 2017-01-01 | Stop reason: HOSPADM

## 2017-01-01 RX ORDER — LEVOTHYROXINE SODIUM 175 UG/1
175 TABLET ORAL
Qty: 90 TAB | Refills: 3 | Status: SHIPPED | OUTPATIENT
Start: 2017-01-01 | End: 2017-01-01 | Stop reason: SDUPTHER

## 2017-01-01 RX ORDER — TAMSULOSIN HYDROCHLORIDE 0.4 MG/1
0.4 CAPSULE ORAL DAILY
Qty: 90 CAP | Refills: 2 | Status: ON HOLD | OUTPATIENT
Start: 2017-01-01 | End: 2018-01-01

## 2017-01-01 RX ORDER — IRBESARTAN 150 MG/1
150 TABLET ORAL DAILY
COMMUNITY
End: 2017-01-01 | Stop reason: SDUPTHER

## 2017-01-01 RX ORDER — ONDANSETRON 4 MG/1
4 TABLET, ORALLY DISINTEGRATING ORAL EVERY 4 HOURS PRN
Status: DISCONTINUED | OUTPATIENT
Start: 2017-01-01 | End: 2017-01-01 | Stop reason: HOSPADM

## 2017-01-01 RX ORDER — IRBESARTAN 150 MG/1
150 TABLET ORAL DAILY
Status: DISCONTINUED | OUTPATIENT
Start: 2017-01-01 | End: 2017-01-01 | Stop reason: HOSPADM

## 2017-01-01 RX ORDER — AMLODIPINE BESYLATE 5 MG/1
5 TABLET ORAL DAILY
Qty: 30 TAB
Start: 2017-01-01 | End: 2017-01-01 | Stop reason: SDUPTHER

## 2017-01-01 RX ORDER — IRBESARTAN 150 MG/1
150 TABLET ORAL DAILY
Qty: 30 TAB | Refills: 6 | Status: ON HOLD | OUTPATIENT
Start: 2017-01-01 | End: 2018-01-01

## 2017-01-01 RX ADMIN — CIPROFLOXACIN HYDROCHLORIDE 500 MG: 500 TABLET, FILM COATED ORAL at 20:11

## 2017-01-01 RX ADMIN — LEVOTHYROXINE SODIUM 137 MCG: 137 TABLET ORAL at 05:23

## 2017-01-01 RX ADMIN — ATORVASTATIN CALCIUM 20 MG: 20 TABLET, FILM COATED ORAL at 08:55

## 2017-01-01 RX ADMIN — CARVEDILOL 12.5 MG: 12.5 TABLET, FILM COATED ORAL at 16:30

## 2017-01-01 RX ADMIN — CIPROFLOXACIN HYDROCHLORIDE 500 MG: 500 TABLET, FILM COATED ORAL at 09:15

## 2017-01-01 RX ADMIN — SODIUM CHLORIDE: 9 INJECTION, SOLUTION INTRAVENOUS at 05:29

## 2017-01-01 RX ADMIN — ATORVASTATIN CALCIUM 20 MG: 20 TABLET, FILM COATED ORAL at 08:17

## 2017-01-01 RX ADMIN — HEPARIN SODIUM 5000 UNITS: 5000 INJECTION, SOLUTION INTRAVENOUS; SUBCUTANEOUS at 20:31

## 2017-01-01 RX ADMIN — AZITHROMYCIN FOR INJECTION INJECTION, POWDER, LYOPHILIZED, FOR SOLUTION 500 MG: 500 INJECTION INTRAVENOUS at 11:39

## 2017-01-01 RX ADMIN — HALOPERIDOL LACTATE 5 MG: 5 INJECTION, SOLUTION INTRAMUSCULAR at 18:45

## 2017-01-01 RX ADMIN — STANDARDIZED SENNA CONCENTRATE AND DOCUSATE SODIUM 2 TABLET: 8.6; 5 TABLET, FILM COATED ORAL at 20:31

## 2017-01-01 RX ADMIN — SODIUM CHLORIDE: 9 INJECTION, SOLUTION INTRAVENOUS at 04:33

## 2017-01-01 RX ADMIN — AZITHROMYCIN FOR INJECTION INJECTION, POWDER, LYOPHILIZED, FOR SOLUTION 500 MG: 500 INJECTION INTRAVENOUS at 09:00

## 2017-01-01 RX ADMIN — STANDARDIZED SENNA CONCENTRATE AND DOCUSATE SODIUM 2 TABLET: 8.6; 5 TABLET, FILM COATED ORAL at 20:13

## 2017-01-01 RX ADMIN — STANDARDIZED SENNA CONCENTRATE AND DOCUSATE SODIUM 2 TABLET: 8.6; 5 TABLET, FILM COATED ORAL at 07:49

## 2017-01-01 RX ADMIN — IRBESARTAN 150 MG: 150 TABLET ORAL at 07:54

## 2017-01-01 RX ADMIN — IRBESARTAN 150 MG: 150 TABLET ORAL at 09:15

## 2017-01-01 RX ADMIN — CARVEDILOL 12.5 MG: 12.5 TABLET, FILM COATED ORAL at 17:04

## 2017-01-01 RX ADMIN — AMLODIPINE BESYLATE 5 MG: 5 TABLET ORAL at 07:49

## 2017-01-01 RX ADMIN — SODIUM CHLORIDE 500 ML: 9 INJECTION, SOLUTION INTRAVENOUS at 21:39

## 2017-01-01 RX ADMIN — TERAZOSIN HYDROCHLORIDE ANHYDROUS 2 MG: 2 CAPSULE ORAL at 09:15

## 2017-01-01 RX ADMIN — CIPROFLOXACIN HYDROCHLORIDE 500 MG: 500 TABLET, FILM COATED ORAL at 02:00

## 2017-01-01 RX ADMIN — IRBESARTAN 150 MG: 150 TABLET ORAL at 08:59

## 2017-01-01 RX ADMIN — CARVEDILOL 12.5 MG: 12.5 TABLET, FILM COATED ORAL at 07:54

## 2017-01-01 RX ADMIN — RANOLAZINE 500 MG: 500 TABLET, FILM COATED, EXTENDED RELEASE ORAL at 08:17

## 2017-01-01 RX ADMIN — SODIUM CHLORIDE: 9 INJECTION, SOLUTION INTRAVENOUS at 23:57

## 2017-01-01 RX ADMIN — HEPARIN SODIUM 5000 UNITS: 5000 INJECTION, SOLUTION INTRAVENOUS; SUBCUTANEOUS at 20:13

## 2017-01-01 RX ADMIN — RANOLAZINE 500 MG: 500 TABLET, FILM COATED, EXTENDED RELEASE ORAL at 09:15

## 2017-01-01 RX ADMIN — LABETALOL HYDROCHLORIDE 10 MG: 5 INJECTION, SOLUTION INTRAVENOUS at 05:25

## 2017-01-01 RX ADMIN — AMLODIPINE BESYLATE 5 MG: 5 TABLET ORAL at 09:12

## 2017-01-01 RX ADMIN — AZITHROMYCIN FOR INJECTION INJECTION, POWDER, LYOPHILIZED, FOR SOLUTION 500 MG: 500 INJECTION INTRAVENOUS at 09:11

## 2017-01-01 RX ADMIN — ASPIRIN 81 MG: 81 TABLET ORAL at 09:08

## 2017-01-01 RX ADMIN — HEPARIN SODIUM 5000 UNITS: 5000 INJECTION, SOLUTION INTRAVENOUS; SUBCUTANEOUS at 05:15

## 2017-01-01 RX ADMIN — CEFTRIAXONE SODIUM 2 G: 2 INJECTION, POWDER, FOR SOLUTION INTRAMUSCULAR; INTRAVENOUS at 09:04

## 2017-01-01 RX ADMIN — ASPIRIN 81 MG: 81 TABLET ORAL at 09:15

## 2017-01-01 RX ADMIN — STANDARDIZED SENNA CONCENTRATE AND DOCUSATE SODIUM 2 TABLET: 8.6; 5 TABLET, FILM COATED ORAL at 20:06

## 2017-01-01 RX ADMIN — RANOLAZINE 500 MG: 500 TABLET, FILM COATED, EXTENDED RELEASE ORAL at 21:12

## 2017-01-01 RX ADMIN — TAMSULOSIN HYDROCHLORIDE 0.4 MG: 0.4 CAPSULE ORAL at 09:11

## 2017-01-01 RX ADMIN — TAMSULOSIN HYDROCHLORIDE 0.4 MG: 0.4 CAPSULE ORAL at 08:55

## 2017-01-01 RX ADMIN — TERAZOSIN HYDROCHLORIDE ANHYDROUS 2 MG: 2 CAPSULE ORAL at 07:54

## 2017-01-01 RX ADMIN — TERAZOSIN HYDROCHLORIDE ANHYDROUS 2 MG: 2 CAPSULE ORAL at 08:17

## 2017-01-01 RX ADMIN — RANOLAZINE 500 MG: 500 TABLET, FILM COATED, EXTENDED RELEASE ORAL at 07:49

## 2017-01-01 RX ADMIN — RANOLAZINE 500 MG: 500 TABLET, FILM COATED, EXTENDED RELEASE ORAL at 20:13

## 2017-01-01 RX ADMIN — ATORVASTATIN CALCIUM 20 MG: 20 TABLET, FILM COATED ORAL at 20:11

## 2017-01-01 RX ADMIN — CLOPIDOGREL 75 MG: 75 TABLET, FILM COATED ORAL at 09:11

## 2017-01-01 RX ADMIN — LEVOTHYROXINE SODIUM 150 MCG: 150 TABLET ORAL at 06:29

## 2017-01-01 RX ADMIN — CLOPIDOGREL 75 MG: 75 TABLET, FILM COATED ORAL at 07:54

## 2017-01-01 RX ADMIN — STANDARDIZED SENNA CONCENTRATE AND DOCUSATE SODIUM 2 TABLET: 8.6; 5 TABLET, FILM COATED ORAL at 09:11

## 2017-01-01 RX ADMIN — HALOPERIDOL LACTATE 5 MG: 5 INJECTION, SOLUTION INTRAMUSCULAR at 05:25

## 2017-01-01 RX ADMIN — HEPARIN SODIUM 5000 UNITS: 5000 INJECTION, SOLUTION INTRAVENOUS; SUBCUTANEOUS at 05:42

## 2017-01-01 RX ADMIN — TAMSULOSIN HYDROCHLORIDE 0.4 MG: 0.4 CAPSULE ORAL at 09:08

## 2017-01-01 RX ADMIN — IRBESARTAN 150 MG: 150 TABLET ORAL at 07:50

## 2017-01-01 RX ADMIN — SODIUM CHLORIDE 1000 ML: 9 INJECTION, SOLUTION INTRAVENOUS at 22:04

## 2017-01-01 RX ADMIN — TAMSULOSIN HYDROCHLORIDE 0.4 MG: 0.4 CAPSULE ORAL at 07:49

## 2017-01-01 RX ADMIN — CARVEDILOL 12.5 MG: 12.5 TABLET, FILM COATED ORAL at 08:17

## 2017-01-01 RX ADMIN — CYANOCOBALAMIN 1000 MCG: 1000 INJECTION, SOLUTION INTRAMUSCULAR; SUBCUTANEOUS at 12:00

## 2017-01-01 RX ADMIN — ATORVASTATIN CALCIUM 20 MG: 20 TABLET, FILM COATED ORAL at 07:49

## 2017-01-01 RX ADMIN — SODIUM CHLORIDE: 9 INJECTION, SOLUTION INTRAVENOUS at 09:22

## 2017-01-01 RX ADMIN — RANOLAZINE 500 MG: 500 TABLET, FILM COATED, EXTENDED RELEASE ORAL at 20:11

## 2017-01-01 RX ADMIN — HEPARIN SODIUM 5000 UNITS: 5000 INJECTION, SOLUTION INTRAVENOUS; SUBCUTANEOUS at 05:25

## 2017-01-01 RX ADMIN — CEFTRIAXONE SODIUM 2 G: 2 INJECTION, POWDER, FOR SOLUTION INTRAMUSCULAR; INTRAVENOUS at 07:54

## 2017-01-01 RX ADMIN — MAGNESIUM SULFATE HEPTAHYDRATE 2 G: 500 INJECTION, SOLUTION INTRAMUSCULAR; INTRAVENOUS at 12:33

## 2017-01-01 RX ADMIN — AMLODIPINE BESYLATE 5 MG: 5 TABLET ORAL at 08:56

## 2017-01-01 RX ADMIN — CLOPIDOGREL 75 MG: 75 TABLET, FILM COATED ORAL at 08:55

## 2017-01-01 RX ADMIN — HEPARIN SODIUM 5000 UNITS: 5000 INJECTION, SOLUTION INTRAVENOUS; SUBCUTANEOUS at 13:16

## 2017-01-01 RX ADMIN — LACTULOSE 30 ML: 20 SOLUTION ORAL at 16:18

## 2017-01-01 RX ADMIN — AZITHROMYCIN MONOHYDRATE 500 MG: 500 INJECTION, POWDER, LYOPHILIZED, FOR SOLUTION INTRAVENOUS at 01:50

## 2017-01-01 RX ADMIN — SODIUM CHLORIDE: 9 INJECTION, SOLUTION INTRAVENOUS at 02:37

## 2017-01-01 RX ADMIN — CEFTRIAXONE SODIUM 2 G: 2 INJECTION, POWDER, FOR SOLUTION INTRAMUSCULAR; INTRAVENOUS at 08:58

## 2017-01-01 RX ADMIN — HALOPERIDOL LACTATE 5 MG: 5 INJECTION, SOLUTION INTRAMUSCULAR at 20:32

## 2017-01-01 RX ADMIN — SODIUM CHLORIDE: 9 INJECTION, SOLUTION INTRAVENOUS at 20:06

## 2017-01-01 RX ADMIN — ASPIRIN 81 MG: 81 TABLET ORAL at 08:54

## 2017-01-01 RX ADMIN — CARVEDILOL 12.5 MG: 12.5 TABLET, FILM COATED ORAL at 07:49

## 2017-01-01 RX ADMIN — LEVOTHYROXINE SODIUM 137 MCG: 137 TABLET ORAL at 05:42

## 2017-01-01 RX ADMIN — SODIUM CHLORIDE 1000 ML: 9 INJECTION, SOLUTION INTRAVENOUS at 00:46

## 2017-01-01 RX ADMIN — AZITHROMYCIN FOR INJECTION INJECTION, POWDER, LYOPHILIZED, FOR SOLUTION 500 MG: 500 INJECTION INTRAVENOUS at 09:22

## 2017-01-01 RX ADMIN — ATORVASTATIN CALCIUM 20 MG: 20 TABLET, FILM COATED ORAL at 07:54

## 2017-01-01 RX ADMIN — SODIUM POLYSTYRENE SULFONATE 30 G: 15 SUSPENSION ORAL; RECTAL at 23:33

## 2017-01-01 RX ADMIN — AMPICILLIN SODIUM AND SULBACTAM SODIUM 3 G: 2; 1 INJECTION, POWDER, FOR SOLUTION INTRAMUSCULAR; INTRAVENOUS at 01:17

## 2017-01-01 RX ADMIN — STANDARDIZED SENNA CONCENTRATE AND DOCUSATE SODIUM 2 TABLET: 8.6; 5 TABLET, FILM COATED ORAL at 07:54

## 2017-01-01 RX ADMIN — DEXTROSE MONOHYDRATE 50 ML: 25 INJECTION, SOLUTION INTRAVENOUS at 21:59

## 2017-01-01 RX ADMIN — CLOPIDOGREL 75 MG: 75 TABLET, FILM COATED ORAL at 07:49

## 2017-01-01 RX ADMIN — STANDARDIZED SENNA CONCENTRATE AND DOCUSATE SODIUM 2 TABLET: 8.6; 5 TABLET, FILM COATED ORAL at 09:16

## 2017-01-01 RX ADMIN — HEPARIN SODIUM 5000 UNITS: 5000 INJECTION, SOLUTION INTRAVENOUS; SUBCUTANEOUS at 15:20

## 2017-01-01 RX ADMIN — HEPARIN SODIUM 5000 UNITS: 5000 INJECTION, SOLUTION INTRAVENOUS; SUBCUTANEOUS at 13:33

## 2017-01-01 RX ADMIN — RANOLAZINE 500 MG: 500 TABLET, FILM COATED, EXTENDED RELEASE ORAL at 20:31

## 2017-01-01 RX ADMIN — SODIUM CHLORIDE: 9 INJECTION, SOLUTION INTRAVENOUS at 05:24

## 2017-01-01 RX ADMIN — CLOPIDOGREL 75 MG: 75 TABLET, FILM COATED ORAL at 09:15

## 2017-01-01 RX ADMIN — CLOPIDOGREL 75 MG: 75 TABLET, FILM COATED ORAL at 09:07

## 2017-01-01 RX ADMIN — STANDARDIZED SENNA CONCENTRATE AND DOCUSATE SODIUM 2 TABLET: 8.6; 5 TABLET, FILM COATED ORAL at 20:11

## 2017-01-01 RX ADMIN — RANOLAZINE 500 MG: 500 TABLET, FILM COATED, EXTENDED RELEASE ORAL at 09:11

## 2017-01-01 RX ADMIN — CALCIUM GLUCONATE 1000 MG: 94 INJECTION, SOLUTION INTRAVENOUS at 21:41

## 2017-01-01 RX ADMIN — ASPIRIN 81 MG: 81 TABLET ORAL at 09:11

## 2017-01-01 RX ADMIN — TERAZOSIN HYDROCHLORIDE ANHYDROUS 2 MG: 2 CAPSULE ORAL at 07:49

## 2017-01-01 RX ADMIN — STANDARDIZED SENNA CONCENTRATE AND DOCUSATE SODIUM 2 TABLET: 8.6; 5 TABLET, FILM COATED ORAL at 08:17

## 2017-01-01 RX ADMIN — SODIUM CHLORIDE: 9 INJECTION, SOLUTION INTRAVENOUS at 18:46

## 2017-01-01 RX ADMIN — AMLODIPINE BESYLATE 2.5 MG: 5 TABLET ORAL at 09:08

## 2017-01-01 RX ADMIN — CARVEDILOL 12.5 MG: 12.5 TABLET, FILM COATED ORAL at 17:01

## 2017-01-01 RX ADMIN — ISOSORBIDE MONONITRATE 60 MG: 30 TABLET, EXTENDED RELEASE ORAL at 08:17

## 2017-01-01 RX ADMIN — CEFTRIAXONE SODIUM 2 G: 2 INJECTION, POWDER, FOR SOLUTION INTRAMUSCULAR; INTRAVENOUS at 08:26

## 2017-01-01 RX ADMIN — RANOLAZINE 500 MG: 500 TABLET, FILM COATED, EXTENDED RELEASE ORAL at 07:54

## 2017-01-01 RX ADMIN — TERAZOSIN HYDROCHLORIDE ANHYDROUS 2 MG: 2 CAPSULE ORAL at 08:55

## 2017-01-01 RX ADMIN — TAMSULOSIN HYDROCHLORIDE 0.4 MG: 0.4 CAPSULE ORAL at 09:15

## 2017-01-01 RX ADMIN — CARVEDILOL 12.5 MG: 12.5 TABLET, FILM COATED ORAL at 18:32

## 2017-01-01 RX ADMIN — AMLODIPINE BESYLATE 5 MG: 5 TABLET ORAL at 08:17

## 2017-01-01 RX ADMIN — CARVEDILOL 12.5 MG: 12.5 TABLET, FILM COATED ORAL at 17:36

## 2017-01-01 RX ADMIN — RANOLAZINE 500 MG: 500 TABLET, FILM COATED, EXTENDED RELEASE ORAL at 09:08

## 2017-01-01 RX ADMIN — CIPROFLOXACIN HYDROCHLORIDE 500 MG: 500 TABLET, FILM COATED ORAL at 09:12

## 2017-01-01 RX ADMIN — HEPARIN SODIUM 5000 UNITS: 5000 INJECTION, SOLUTION INTRAVENOUS; SUBCUTANEOUS at 21:12

## 2017-01-01 RX ADMIN — HEPARIN SODIUM 5000 UNITS: 5000 INJECTION, SOLUTION INTRAVENOUS; SUBCUTANEOUS at 14:54

## 2017-01-01 RX ADMIN — IRBESARTAN 150 MG: 150 TABLET ORAL at 09:13

## 2017-01-01 RX ADMIN — ASPIRIN 81 MG: 81 TABLET ORAL at 07:49

## 2017-01-01 RX ADMIN — AMLODIPINE BESYLATE 5 MG: 5 TABLET ORAL at 09:15

## 2017-01-01 RX ADMIN — CYANOCOBALAMIN 1000 MCG: 1000 INJECTION, SOLUTION INTRAMUSCULAR; SUBCUTANEOUS at 15:13

## 2017-01-01 RX ADMIN — SODIUM POLYSTYRENE SULFONATE 15 G: 15 SUSPENSION ORAL; RECTAL at 03:09

## 2017-01-01 RX ADMIN — HEPARIN SODIUM 5000 UNITS: 5000 INJECTION, SOLUTION INTRAVENOUS; SUBCUTANEOUS at 05:23

## 2017-01-01 RX ADMIN — CARVEDILOL 12.5 MG: 12.5 TABLET, FILM COATED ORAL at 09:11

## 2017-01-01 RX ADMIN — LEVOTHYROXINE SODIUM 137 MCG: 137 TABLET ORAL at 05:15

## 2017-01-01 RX ADMIN — TERAZOSIN HYDROCHLORIDE ANHYDROUS 2 MG: 2 CAPSULE ORAL at 09:12

## 2017-01-01 RX ADMIN — STANDARDIZED SENNA CONCENTRATE AND DOCUSATE SODIUM 2 TABLET: 8.6; 5 TABLET, FILM COATED ORAL at 21:12

## 2017-01-01 RX ADMIN — CEFTRIAXONE SODIUM 2 G: 2 INJECTION, POWDER, FOR SOLUTION INTRAMUSCULAR; INTRAVENOUS at 07:50

## 2017-01-01 RX ADMIN — ASPIRIN 81 MG: 81 TABLET ORAL at 07:54

## 2017-01-01 RX ADMIN — HEPARIN SODIUM 5000 UNITS: 5000 INJECTION, SOLUTION INTRAVENOUS; SUBCUTANEOUS at 15:30

## 2017-01-01 RX ADMIN — SODIUM CHLORIDE: 9 INJECTION, SOLUTION INTRAVENOUS at 12:18

## 2017-01-01 RX ADMIN — INSULIN HUMAN 10 UNITS: 100 INJECTION, SOLUTION PARENTERAL at 22:02

## 2017-01-01 RX ADMIN — SODIUM CHLORIDE: 9 INJECTION, SOLUTION INTRAVENOUS at 08:53

## 2017-01-01 RX ADMIN — TERAZOSIN HYDROCHLORIDE ANHYDROUS 2 MG: 2 CAPSULE ORAL at 09:08

## 2017-01-01 RX ADMIN — CLOPIDOGREL 75 MG: 75 TABLET, FILM COATED ORAL at 08:17

## 2017-01-01 RX ADMIN — ASPIRIN 81 MG: 81 TABLET ORAL at 08:17

## 2017-01-01 RX ADMIN — TAMSULOSIN HYDROCHLORIDE 0.4 MG: 0.4 CAPSULE ORAL at 07:54

## 2017-01-01 RX ADMIN — SODIUM CHLORIDE: 9 INJECTION, SOLUTION INTRAVENOUS at 00:08

## 2017-01-01 RX ADMIN — SODIUM CHLORIDE: 9 INJECTION, SOLUTION INTRAVENOUS at 22:36

## 2017-01-01 RX ADMIN — LEVOTHYROXINE SODIUM 137 MCG: 137 TABLET ORAL at 05:24

## 2017-01-01 RX ADMIN — AMLODIPINE BESYLATE 5 MG: 5 TABLET ORAL at 07:54

## 2017-01-01 RX ADMIN — AZITHROMYCIN FOR INJECTION INJECTION, POWDER, LYOPHILIZED, FOR SOLUTION 500 MG: 500 INJECTION INTRAVENOUS at 10:07

## 2017-01-01 RX ADMIN — SODIUM CHLORIDE 1000 ML: 9 INJECTION, SOLUTION INTRAVENOUS at 22:50

## 2017-01-01 RX ADMIN — LEVOTHYROXINE SODIUM 150 MCG: 150 TABLET ORAL at 07:00

## 2017-01-01 RX ADMIN — TAMSULOSIN HYDROCHLORIDE 0.4 MG: 0.4 CAPSULE ORAL at 08:17

## 2017-01-01 RX ADMIN — CARVEDILOL 12.5 MG: 12.5 TABLET, FILM COATED ORAL at 08:55

## 2017-01-01 RX ADMIN — ISOSORBIDE MONONITRATE 60 MG: 30 TABLET, EXTENDED RELEASE ORAL at 09:08

## 2017-01-01 RX ADMIN — FUROSEMIDE 20 MG: 40 TABLET ORAL at 21:57

## 2017-01-01 RX ADMIN — RANOLAZINE 500 MG: 500 TABLET, FILM COATED, EXTENDED RELEASE ORAL at 08:55

## 2017-01-01 RX ADMIN — ATORVASTATIN CALCIUM 20 MG: 20 TABLET, FILM COATED ORAL at 09:08

## 2017-01-01 RX ADMIN — RANOLAZINE 500 MG: 500 TABLET, FILM COATED, EXTENDED RELEASE ORAL at 20:06

## 2017-01-01 ASSESSMENT — COPD QUESTIONNAIRES
DO YOU EVER COUGH UP ANY MUCUS OR PHLEGM?: YES, A FEW DAYS A WEEK OR MONTH
DO YOU EVER COUGH UP ANY MUCUS OR PHLEGM?: YES, A FEW DAYS A WEEK OR MONTH
DURING THE PAST 4 WEEKS HOW MUCH DID YOU FEEL SHORT OF BREATH: SOME OF THE TIME
HAVE YOU SMOKED AT LEAST 100 CIGARETTES IN YOUR ENTIRE LIFE: YES
DURING THE PAST 4 WEEKS HOW MUCH DID YOU FEEL SHORT OF BREATH: SOME OF THE TIME
COPD SCREENING SCORE: 6
COPD SCREENING SCORE: 6
HAVE YOU SMOKED AT LEAST 100 CIGARETTES IN YOUR ENTIRE LIFE: YES

## 2017-01-01 ASSESSMENT — PATIENT HEALTH QUESTIONNAIRE - PHQ9
SUM OF ALL RESPONSES TO PHQ9 QUESTIONS 1 AND 2: 0
SUM OF ALL RESPONSES TO PHQ9 QUESTIONS 1 AND 2: 0
CLINICAL INTERPRETATION OF PHQ2 SCORE: 0
2. FEELING DOWN, DEPRESSED, IRRITABLE, OR HOPELESS: NOT AT ALL
SUM OF ALL RESPONSES TO PHQ QUESTIONS 1-9: 0
SUM OF ALL RESPONSES TO PHQ QUESTIONS 1-9: 0
1. LITTLE INTEREST OR PLEASURE IN DOING THINGS: NOT AT ALL
2. FEELING DOWN, DEPRESSED, IRRITABLE, OR HOPELESS: NOT AT ALL
1. LITTLE INTEREST OR PLEASURE IN DOING THINGS: NOT AT ALL
2. FEELING DOWN, DEPRESSED, IRRITABLE, OR HOPELESS: NOT AT ALL
SUM OF ALL RESPONSES TO PHQ QUESTIONS 1-9: 0
SUM OF ALL RESPONSES TO PHQ9 QUESTIONS 1 AND 2: 0
2. FEELING DOWN, DEPRESSED, IRRITABLE, OR HOPELESS: NOT AT ALL
SUM OF ALL RESPONSES TO PHQ QUESTIONS 1-9: 0
SUM OF ALL RESPONSES TO PHQ9 QUESTIONS 1 AND 2: 0
1. LITTLE INTEREST OR PLEASURE IN DOING THINGS: NOT AT ALL
1. LITTLE INTEREST OR PLEASURE IN DOING THINGS: NOT AT ALL

## 2017-01-01 ASSESSMENT — COGNITIVE AND FUNCTIONAL STATUS - GENERAL
MOVING TO AND FROM BED TO CHAIR: A LITTLE
TURNING FROM BACK TO SIDE WHILE IN FLAT BAD: A LITTLE
WALKING IN HOSPITAL ROOM: A LITTLE
PERSONAL GROOMING: A LITTLE
MOBILITY SCORE: 18
EATING MEALS: A LITTLE
TOILETING: A LOT
CLIMB 3 TO 5 STEPS WITH RAILING: A LITTLE
SUGGESTED CMS G CODE MODIFIER DAILY ACTIVITY: CK
DRESSING REGULAR LOWER BODY CLOTHING: A LOT
DAILY ACTIVITIY SCORE: 16
DRESSING REGULAR UPPER BODY CLOTHING: A LITTLE
HELP NEEDED FOR BATHING: A LITTLE
SUGGESTED CMS G CODE MODIFIER MOBILITY: CK
STANDING UP FROM CHAIR USING ARMS: A LITTLE
MOVING FROM LYING ON BACK TO SITTING ON SIDE OF FLAT BED: A LITTLE

## 2017-01-01 ASSESSMENT — ENCOUNTER SYMPTOMS
FOCAL WEAKNESS: 0
CLAUDICATION: 0
PALPITATIONS: 0
TINGLING: 0
BLURRED VISION: 0
SORE THROAT: 0
CHILLS: 0
NAUSEA: 0
CHILLS: 0
HALLUCINATIONS: 0
HEADACHES: 0
MYALGIAS: 0
HEADACHES: 0
DIARRHEA: 0
CHILLS: 0
COUGH: 0
NECK PAIN: 0
FOCAL WEAKNESS: 0
CHILLS: 0
FEVER: 0
HEADACHES: 0
CHILLS: 0
SENSORY CHANGE: 0
PALPITATIONS: 0
PHOTOPHOBIA: 0
MYALGIAS: 0
WHEEZING: 0
ABDOMINAL PAIN: 0
NAUSEA: 1
VOMITING: 0
VOMITING: 0
CHILLS: 0
LOSS OF CONSCIOUSNESS: 0
MYALGIAS: 0
JOINT SWELLING: 1
INSOMNIA: 0
ABDOMINAL PAIN: 0
COUGH: 0
DEPRESSION: 0
CHILLS: 0
PALPITATIONS: 0
PALPITATIONS: 0
FEVER: 0
FEVER: 0
HEADACHES: 0
SHORTNESS OF BREATH: 0
DIZZINESS: 0
BACK PAIN: 0
WEAKNESS: 0
HEADACHES: 0
CHANGE IN BOWEL HABIT: 0
SEIZURES: 0
VOMITING: 0
EYE REDNESS: 0
FEVER: 0
BACK PAIN: 1
SENSORY CHANGE: 0
SHORTNESS OF BREATH: 0
DIZZINESS: 0
FEVER: 0
SENSORY CHANGE: 0
MEMORY LOSS: 0
FALLS: 1
HEARTBURN: 0
CONSTIPATION: 0
FALLS: 1
COUGH: 0
CHILLS: 0
CHILLS: 0
SPEECH CHANGE: 0
DEPRESSION: 0
NERVOUS/ANXIOUS: 0
MEMORY LOSS: 0
EYE DISCHARGE: 0
FEVER: 0
DIARRHEA: 0
BRUISES/BLEEDS EASILY: 0
FEVER: 0
VOMITING: 0
ABDOMINAL PAIN: 0
ABDOMINAL PAIN: 0
DEPRESSION: 0
SENSORY CHANGE: 0
SHORTNESS OF BREATH: 0
VOMITING: 0
FEVER: 0
DIARRHEA: 0
FEVER: 0
SEIZURES: 0
PALPITATIONS: 0
SPEECH CHANGE: 0

## 2017-01-01 ASSESSMENT — GAIT ASSESSMENTS
DEVIATION: SHUFFLED GAIT
ASSISTIVE DEVICE: FRONT WHEEL WALKER
DISTANCE (FEET): 500
ASSISTIVE DEVICE: FRONT WHEEL WALKER
GAIT LEVEL OF ASSIST: CONTACT GUARD ASSIST
DEVIATION: SHUFFLED GAIT;DECREASED BASE OF SUPPORT
DISTANCE (FEET): 120
GAIT LEVEL OF ASSIST: CONTACT GUARD ASSIST

## 2017-01-01 ASSESSMENT — PAIN SCALES - GENERAL
PAINLEVEL_OUTOF10: 0
PAINLEVEL_OUTOF10: 4
PAINLEVEL_OUTOF10: 0
PAINLEVEL_OUTOF10: 0
PAINLEVEL: NO PAIN
PAINLEVEL_OUTOF10: 0
PAINLEVEL_OUTOF10: 3
PAINLEVEL_OUTOF10: 0
PAINLEVEL_OUTOF10: 5
PAINLEVEL_OUTOF10: 0

## 2017-01-01 ASSESSMENT — LIFESTYLE VARIABLES
HOW MANY TIMES IN THE PAST YEAR HAVE YOU HAD 5 OR MORE DRINKS IN A DAY: 2
AVERAGE NUMBER OF DAYS PER WEEK YOU HAVE A DRINK CONTAINING ALCOHOL: 0
ALCOHOL_USE: YES
DO YOU DRINK ALCOHOL: YES
EVER HAD A DRINK FIRST THING IN THE MORNING TO STEADY YOUR NERVES TO GET RID OF A HANGOVER: NO
HAVE YOU EVER FELT YOU SHOULD CUT DOWN ON YOUR DRINKING: NO
EVER_SMOKED: UNABLE TO EVALUATE AT THIS TIME - NEEDS ASSESSMENT PRIOR TO DISCHARGE
AVERAGE NUMBER OF DAYS PER WEEK YOU HAVE A DRINK CONTAINING ALCOHOL: 0
EVER_SMOKED: YES
EVER FELT BAD OR GUILTY ABOUT YOUR DRINKING: NO
DO YOU DRINK ALCOHOL: NO
ON A TYPICAL DAY WHEN YOU DRINK ALCOHOL HOW MANY DRINKS DO YOU HAVE: 0
TOTAL SCORE: 0
HOW MANY TIMES IN THE PAST YEAR HAVE YOU HAD 5 OR MORE DRINKS IN A DAY: 2
TOTAL SCORE: 0
CONSUMPTION TOTAL: POSITIVE
HAVE PEOPLE ANNOYED YOU BY CRITICIZING YOUR DRINKING: NO
ON A TYPICAL DAY WHEN YOU DRINK ALCOHOL HOW MANY DRINKS DO YOU HAVE: 0
HAVE PEOPLE ANNOYED YOU BY CRITICIZING YOUR DRINKING: NO
SUBSTANCE_ABUSE: 0
TOTAL SCORE: 0
DO YOU DRINK ALCOHOL: NO
EVER HAD A DRINK FIRST THING IN THE MORNING TO STEADY YOUR NERVES TO GET RID OF A HANGOVER: NO
TOTAL SCORE: 0
HAVE YOU EVER FELT YOU SHOULD CUT DOWN ON YOUR DRINKING: NO
EVER FELT BAD OR GUILTY ABOUT YOUR DRINKING: NO
ALCOHOL_USE: NO
CONSUMPTION TOTAL: POSITIVE
EVER_SMOKED: YES
TOTAL SCORE: 0
TOTAL SCORE: 0

## 2017-01-12 ENCOUNTER — PATIENT MESSAGE (OUTPATIENT)
Dept: MEDICAL GROUP | Facility: CLINIC | Age: 82
End: 2017-01-12

## 2017-01-14 ENCOUNTER — PATIENT MESSAGE (OUTPATIENT)
Dept: MEDICAL GROUP | Facility: CLINIC | Age: 82
End: 2017-01-14

## 2017-01-14 NOTE — TELEPHONE ENCOUNTER
From: Erica Fisher  To: Evelin Mccray M.D.  Sent: 1/13/2017 10:18 PM PST  Subject: RE:Dr. Basilio    Thank you DrDavid In fact the number of static-like sounds in my head have increased in the last two weeks. Interesting as my life style has not changed one bit. I shall look forward to my meeting with Dr. Basilio.  As always, you play a major part. No pain, feel good. Tired in late afternoon but then - whaat is new?  Thanks   Skip. On top of all else, have to move in next three monthss. Run out of funds. If it aint one thing it is another, sachin?  ----- Message -----  From: Evelin Mccray M.D.  Sent: 1/13/2017 5:36 PM PST  To: Erica Fisher  Subject: Dr. Basilio    This is actually up to Dr. Basilio to decide and will depend on the readings they have obtained. The end of month visit with him is crucial.

## 2017-01-17 ENCOUNTER — PATIENT MESSAGE (OUTPATIENT)
Dept: MEDICAL GROUP | Facility: CLINIC | Age: 82
End: 2017-01-17

## 2017-01-17 NOTE — TELEPHONE ENCOUNTER
From: Erica Fisher  To: Evelin Mccray M.D.  Sent: 1/17/2017 11:44 AM PST  Subject: Non-Urgent Medical Question    Dr. Mccray. I plan on teaching Jacob this evening. I asked him yesterday if a 20 minute - non stop lesson was more/equal/less than GOING SLOWLY up 15 stairs. Never got an answer so I shall pose the same to you. Please recognize my lesson ran a little longer than that, Jacob checked me out, STILLL HERE. Would appreciate your guidance for now et al.  Skip

## 2017-01-17 NOTE — TELEPHONE ENCOUNTER
From: Erica Fisher  To: Evelin Mccray M.D.  Sent: 1/17/2017 9:48 AM PST  Subject: Non-Urgent Medical Question    Good Morning Dr. Mccray. As I read your answer, I realized that is THE FIRST TIME YOU HAVE NOT given me a definitive response. To say the least it concerns me. Guess work on my future is troubling and Totally UNLIKE you. As you know I have enough balls in the air now. Just doesn't seem like you. The upcoming move is not done with cony but, MUST be done.     As you, I am not a coward and will do whatever I must to survive. - I TRUST YOU and did not feel my questions inordinate.  Thanks  Skip

## 2017-01-18 NOTE — TELEPHONE ENCOUNTER
From: Erica Fisher  To: Evelin Mccray M.D.  Sent: 1/17/2017 6:37 PM PST  Subject: RE:a little miscommunication    Dr. Mccray. No, we are on course. It is that I have NOT HEARD A WORD from ANYONE as to the things you brought and I feel I SHOULD be on board of where Dr. Basilio is going with this thing. Do you agree? If I am incorrect , please to let me know.  Thanks again.   Skip  Guess I am spoiled. YOU always kept me INFORMED. Hmmmmmmmmm???  ----- Message -----  From: Evelin Mccray M.D.  Sent: 1/17/2017 1:02 PM PST  To: Erica Fisher  Subject: a little miscommunication    I wonder if we are having some miscommunication. Actually, I think you are referring to my comments concerning Dr. Basilio, your cardiologist. I really cannot tell you how long the loop needs to stay implanted. Only your cardiologist can tell you that because it depends very much on your results from the monitor and the status of your heart. I hope this has clarified the situation.

## 2017-01-28 DIAGNOSIS — I10 ESSENTIAL HYPERTENSION: ICD-10-CM

## 2017-01-30 ENCOUNTER — OFFICE VISIT (OUTPATIENT)
Dept: CARDIOLOGY | Facility: MEDICAL CENTER | Age: 82
End: 2017-01-30
Payer: MEDICARE

## 2017-01-30 VITALS
DIASTOLIC BLOOD PRESSURE: 60 MMHG | WEIGHT: 170 LBS | BODY MASS INDEX: 26.68 KG/M2 | HEART RATE: 66 BPM | SYSTOLIC BLOOD PRESSURE: 110 MMHG | OXYGEN SATURATION: 97 % | HEIGHT: 67 IN

## 2017-01-30 DIAGNOSIS — I48.0 PAROXYSMAL ATRIAL FIBRILLATION (HCC): Chronic | ICD-10-CM

## 2017-01-30 DIAGNOSIS — I20.89 ANGINA EFFORT: ICD-10-CM

## 2017-01-30 DIAGNOSIS — Z98.890 HX OF REPAIR OF DISSECTING THORACIC AORTIC ANEURYSM, STANFORD TYPE A: ICD-10-CM

## 2017-01-30 DIAGNOSIS — I10 ESSENTIAL HYPERTENSION: ICD-10-CM

## 2017-01-30 DIAGNOSIS — Z86.79 HX OF REPAIR OF DISSECTING THORACIC AORTIC ANEURYSM, STANFORD TYPE A: ICD-10-CM

## 2017-01-30 DIAGNOSIS — E78.5 DYSLIPIDEMIA, GOAL LDL BELOW 100: ICD-10-CM

## 2017-01-30 DIAGNOSIS — Z95.5 STENTED CORONARY ARTERY: ICD-10-CM

## 2017-01-30 DIAGNOSIS — I34.0 NON-RHEUMATIC MITRAL REGURGITATION: Chronic | ICD-10-CM

## 2017-01-30 DIAGNOSIS — I35.1 NONRHEUMATIC AORTIC VALVE INSUFFICIENCY: Chronic | ICD-10-CM

## 2017-01-30 PROCEDURE — G8482 FLU IMMUNIZE ORDER/ADMIN: HCPCS | Performed by: INTERNAL MEDICINE

## 2017-01-30 PROCEDURE — G8432 DEP SCR NOT DOC, RNG: HCPCS | Performed by: INTERNAL MEDICINE

## 2017-01-30 PROCEDURE — G8598 ASA/ANTIPLAT THER USED: HCPCS | Performed by: INTERNAL MEDICINE

## 2017-01-30 PROCEDURE — 1100F PTFALLS ASSESS-DOCD GE2>/YR: CPT | Performed by: INTERNAL MEDICINE

## 2017-01-30 PROCEDURE — 0518F FALL PLAN OF CARE DOCD: CPT | Mod: 8P | Performed by: INTERNAL MEDICINE

## 2017-01-30 PROCEDURE — 3288F FALL RISK ASSESSMENT DOCD: CPT | Performed by: INTERNAL MEDICINE

## 2017-01-30 PROCEDURE — 1036F TOBACCO NON-USER: CPT | Performed by: INTERNAL MEDICINE

## 2017-01-30 PROCEDURE — 99213 OFFICE O/P EST LOW 20 MIN: CPT | Performed by: INTERNAL MEDICINE

## 2017-01-30 PROCEDURE — 4040F PNEUMOC VAC/ADMIN/RCVD: CPT | Performed by: INTERNAL MEDICINE

## 2017-01-30 PROCEDURE — G8420 CALC BMI NORM PARAMETERS: HCPCS | Performed by: INTERNAL MEDICINE

## 2017-01-30 RX ORDER — AMLODIPINE BESYLATE 5 MG/1
TABLET ORAL
Qty: 180 TAB | Refills: 1 | Status: SHIPPED | OUTPATIENT
Start: 2017-01-30 | End: 2017-01-01 | Stop reason: SDUPTHER

## 2017-01-30 ASSESSMENT — ENCOUNTER SYMPTOMS
SHORTNESS OF BREATH: 0
DEPRESSION: 0
WEAKNESS: 0
NAUSEA: 0
PALPITATIONS: 0
LOSS OF CONSCIOUSNESS: 0
BRUISES/BLEEDS EASILY: 0
VOMITING: 0
MEMORY LOSS: 1

## 2017-01-30 NOTE — PROGRESS NOTES
Subjective:   Erica Fisher is a 83 y.o. male who presents today  for follow-up of coronary disease with stents to his circumflex and obtuse marginal arteries in 2010, a history of a type A dissection of the aorta in 2007, chronic angina, and an episode of unexplained syncope. He had an unexplained episode of syncope last fall and has had a loop recorder implanted since October. Review of transmissions Mr. Ayala indicates no heart block or VT. He's had no further bouts of syncope. The patient having difficulty with his memory and is now in assisted living.    Past Medical History   Diagnosis Date   • Dyslipidemia, goal LDL below 100    • Hypothyroidism    • Bifid sternum    • CKD (chronic kidney disease) stage 3, GFR 30-59 ml/min    • Angina effort (CMS-HCC)    • Vitamin d deficiency    • Closed Colles' fracture    • Paroxysmal atrial fibrillation (CMS-HCC)    • Anemia    • CAD (coronary artery disease)    • AAA (abdominal aortic aneurysm) (CMS-HCC)      dissection with repair Type A   • Valvular heart disease      MR/AR   • Myocardial infarct (CMS-HCC) 2000   • Hemorrhagic disorder (CMS-HCC)    • Hypertension      Dr. Bloch pt states bp is running high     Past Surgical History   Procedure Laterality Date   • Colonoscopy  6/2010     in [place], normal   • Hernia repair     • Cholecystectomy     • Other cardiac surgery       AORTIC DISSECTION REPAIR.   • Neck mass excision  10/31/2014     Performed by Nathaniel Cavanaugh M.D. at [facility]   • Aaa with stent graft     • Recovery  6/17/2016     Procedure: CATH LAB-[facility] WITH POSSIBLE-STEPHY;  Surgeon: Recoveryonly Surgery;  [place] [facility] [place]  Service:    • Other       Dr. Castro, muscle reattachment sternum   • Other       stenting   • Recovery  7/1/2016     Procedure: CATH LAB-C W/POSSIBLE-VERONIQUE;  Surgeon: Recoveryonly Surgery;  [place] [facility] [place]  Service:      Family History   Problem Relation Age of Onset    • Non-contributory Mother      very little history, no family history   • Cancer Mother      cancer of the jaw   • Cancer Maternal Grandmother 79     worked to death     History   Smoking status   • Former Smoker   • Types: Pipe   • Quit date: 07/06/1983   Smokeless tobacco   • Never Used     Allergies   Allergen Reactions   • Colchicine Diarrhea and Vomiting     Diarrhea, n/v   • Ibuprofen      Outpatient Encounter Prescriptions as of 1/30/2017   Medication Sig Dispense Refill   • amlodipine (NORVASC) 5 MG Tab TAKE ONE TABLET BY MOUTH TWICE DAILY 180 Tab 1   • vitamin D, Ergocalciferol, (DRISDOL) 02319 UNITS Cap capsule Take 1 Cap by mouth every 7 days. 12 Cap 4   • tamsulosin (FLOMAX) 0.4 MG capsule TAKE  ONE CAPSULE BY MOUTH EVERY MORNING 30 MINUTES AFTER BREAKFAST 90 Cap 3   • clopidogrel (PLAVIX) 75 MG Tab TAKE ONE TABLET BY MOUTH EVERY DAY 90 Tab 3   • levothyroxine (SYNTHROID) 137 MCG Tab Take 1 Tab by mouth Every morning on an empty stomach. 90 Tab 2   • carvedilol (COREG) 25 MG Tab Take 0.5 Tabs by mouth 2 times a day, with meals. 180 Tab 0   • hydrochlorothiazide (HYDRODIURIL) 25 MG Tab TK 1 T PO  ONCE D  3   • atorvastatin (LIPITOR) 20 MG Tab Take 1 Tab by mouth every day. 90 Tab 3   • RANEXA 500 MG TABLET SR 12 HR TAKE 1 TABLET BY MOUTH TWICE DAILY 60 Tab 3   • nitroglycerin (NITROSTAT) 0.4 MG SL Tab Place 1 Tab under tongue as needed for Chest Pain. 25 Tab 6   • isosorbide mononitrate SR (IMDUR) 60 MG TABLET SR 24 HR Take 1 Tab by mouth every day. 90 Tab 3   • terazosin (HYTRIN) 2 MG Cap TAKE 1 CAPSULE BY MOUTH EVERY DAY 90 Cap 3   • allopurinol (ZYLOPRIM) 100 MG Tab Take 1 Tab by mouth every day. 90 Tab 3   • irbesartan (AVAPRO) 150 MG Tab Take 1 Tab by mouth every day. 90 Tab 3   • fluorometholone (FML) 0.1 % Suspension Place  in left eye every day.  0   • aspirin EC (ECOTRIN) 81 MG TBEC Take 81 mg by mouth every day.     • Ferrous Sulfate (IRON) 325 (65 FE) MG TABS Take 1 Tab by mouth every Monday,  "Wednesday, and Friday.     • Ascorbic Acid (VITAMIN C) 1000 MG TABS Take 1 Tab by mouth every day. Dr. Bloch 30 Tab 0     No facility-administered encounter medications on file as of 1/30/2017.     Review of Systems   Constitutional: Positive for malaise/fatigue.   Respiratory: Negative for shortness of breath.    Cardiovascular: Positive for chest pain. Negative for palpitations.   Gastrointestinal: Negative for nausea and vomiting.   Neurological: Negative for loss of consciousness and weakness.   Endo/Heme/Allergies: Does not bruise/bleed easily.   Psychiatric/Behavioral: Positive for memory loss. Negative for depression.        Objective:   /60 mmHg  Pulse 66  Ht 1.702 m (5' 7.01\")  Wt 77.111 kg (170 lb)  BMI 26.62 kg/m2  SpO2 97%    Physical Exam   Constitutional: He is oriented to person, place, and time. He appears well-developed and well-nourished. No distress.   HENT:   Head: Atraumatic.   Eyes: Conjunctivae and EOM are normal. Pupils are equal, round, and reactive to light.   Neck: Neck supple. No JVD present.   Cardiovascular: Normal rate, regular rhythm and intact distal pulses.    Murmur heard.   Systolic murmur is present with a grade of 2/6    Decrescendo diastolic murmur is present with a grade of 2/6   Pulmonary/Chest: Effort normal and breath sounds normal. No respiratory distress. He has no wheezes. He has no rales.   Abdominal: Soft. There is no tenderness.   Musculoskeletal: He exhibits no edema.   Neurological: He is alert and oriented to person, place, and time.   Skin: Skin is warm and dry. He is not diaphoretic.       Assessment:     1. Nonrheumatic aortic valve insufficiency     2. Non-rheumatic mitral regurgitation     3. Essential hypertension     4. Paroxysmal atrial fibrillation (CMS-[facility])     5. Angina effort (CMS-[facility])     6. Dyslipidemia, goal LDL below 100     7. Hx of repair of dissecting thoracic aortic aneurysm, Dudley type A     8. Stented coronary artery   "       Medical Decision Making:  Today's Assessment / Status / Plan:     Cardiac-wise he is doing well. His murmur of aortic insufficiency is unchanged. There is no evidence of volume overload on exam and he's had no angina at all. Blood pressures under good control. By echo last May, he was noted to have progression of his aortic insufficiency which is now severe. Because of his advanced age and comorbidities including memory loss and advanced kidney disease, he would be a poor candidate for any type of intervention.  He had an episode of unexplained lapses of consciousness. There is no evidence of any heart block on his loop recorder to indicate he needs a pacemaker. Continue current medications and return in 6 months

## 2017-01-30 NOTE — MR AVS SNAPSHOT
"        Erica Fisher   2017 12:40 PM   Office Visit   MRN: [id number]    Department:  [facility]   Dept Phone:  [phone]    Description:  Male : 3/17/1933   Provider:  Andrew Basilio M.D.           Reason for Visit     Follow-Up           Allergies as of 2017     Allergen Noted Reactions    Colchicine 2014   Diarrhea, Vomiting    Diarrhea, n/v    Ibuprofen 2011         You were diagnosed with     Nonrheumatic aortic valve insufficiency   [115457]       Non-rheumatic mitral regurgitation   [192733]       Essential hypertension   [8007261]       Paroxysmal atrial fibrillation (CMS-HCC)   [670950]       Angina effort (CMS-HCC)   [266729]       Dyslipidemia, goal LDL below 100   [980690]       Hx of repair of dissecting thoracic aortic aneurysm, Dudley type A   [127943]       Stented coronary artery   [244810]         Vital Signs     Blood Pressure Pulse Height Weight Body Mass Index Oxygen Saturation    110/60 mmHg 66 1.702 m (5' 7.01\") 77.111 kg (170 lb) 26.62 kg/m2 97%    Smoking Status                   Former Smoker           Basic Information     Date Of Birth Sex Race Ethnicity Preferred Language    3/17/1933 Male White Non- English      Your appointments     2017  1:00 PM   New Patient with Jose Levy M.D.   [facility] (--)    [address] [place] [address], [address]  [facility] [address] [phone]           Please bring Photo ID, Insurance Cards, All Medication Bottles and copies of any legal documents (such as Living Will, Power of ) If speaking a language besides English please bring an adult . Please arrive 30 minutes prior for check in and registration. You will be receiving a confirmation call a few days before your appointment from our automated call confirmation system.            May 05, 2017 10:15 AM   Follow Up Visit with Sin Park M.D.   [facility] ([address])    [facility] [address] " For [id number]  [facility] [id number] [phone]           You will be receiving a confirmation call a few days before your appointment from our automated call confirmation system.              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 Low  Unknown - Present    Essential hypertension I10 High  Unknown - Present    Gouty arthritis M10.9 Low  11/20/2012 - Present    Angina effort (CMS-HCC) I20.8 High  12/4/2012 - Present    Aortic regurgitation (Chronic) I35.1 Medium  Unknown - Present    Mitral regurgitation (Chronic) I34.0 Medium  Unknown - Present    Paroxysmal atrial fibrillation (CMS-HCC) (Chronic) I48.0 Medium  Unknown - Present    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 Medium  2/15/2013 - Present    Vitamin D deficiency disease E55.9 Low  Unknown - Present    Stented coronary artery Z95.5   4/7/2014 - Present    Gout M10.9 Low  2/3/2015 - Present    Pulmonary HTN (CMS-HCC) I27.2 Low  5/11/2015 - Present    MEDICAL HOME  Low  5/13/2015 - Present    Pernicious anemia D51.0 Low  6/22/2015 - Present    Vitamin B 12 deficiency E53.8 Low  4/29/2016 - Present    Bradycardia R00.1 High  5/5/2016 - Present    Atherosclerosis of native coronary artery of native heart with angina pectoris (CMS-HCC) I25.119   7/15/2016 - Present    Chronic kidney disease, stage IV (severe) (CMS-HCC) N18.4   11/11/2016 - Present    Secondary hyperparathyroidism of renal origin (CMS-HCC) N25.81   11/11/2016 - Present    Benign prostatic hyperplasia with lower urinary tract symptoms N40.1   11/11/2016 - Present      Health Maintenance         Date Due Completion Dates    IMM PNEUMOCOCCAL 65+ (ADULT) HIGH/HIGHEST RISK SERIES (2 of 2 - PPSV23) 2/13/2017 12/19/2016    IMM DTaP/Tdap/Td Vaccine (1 - Tdap) 12/1/2017 (Originally 3/17/1952) ---    IMM ZOSTER VACCINE 12/1/2017 (Originally 3/17/1993) ---    COLONOSCOPY 6/6/2020 6/6/2010 (Prv Comp)    Override on 6/6/2010: Previously completed (in [place], normal)            Current Immunizations     13-VALENT PCV PREVNAR 12/19/2016    Influenza LAIV (Nasal) 11/16/2012, 10/22/2011    Influenza TIV (IM) 10/2/2014, 11/1/2013    Influenza Vaccine Adult HD 12/19/2016, 9/23/2015    Pneumococcal Vaccine (UF)Historical Data 3/1/2010      Below and/or attached are the medications your provider expects you to take. Review all of your home medications and newly ordered medications with your provider and/or pharmacist. Follow medication instructions as directed by your provider and/or pharmacist. Please keep your medication list with you and share with your provider. Update the information when medications are discontinued, doses are changed, or new medications (including over-the-counter products) are added; and carry medication information at all times in the event of emergency situations     Allergies:  COLCHICINE - Diarrhea,Vomiting     IBUPROFEN - (reactions not documented)               Medications  Valid as of: January 30, 2017 -  1:16 PM    Generic Name Brand Name Tablet Size Instructions for use    Allopurinol (Tab) ZYLOPRIM 100 MG Take 1 Tab by mouth every day.        AmLODIPine Besylate (Tab) NORVASC 5 MG TAKE ONE TABLET BY MOUTH TWICE DAILY        Ascorbic Acid (Tab) Vitamin C 1000 MG Take 1 Tab by mouth every day. Dr. Bloch        Aspirin (Tablet Delayed Response) ECOTRIN 81 MG Take 81 mg by mouth every day.        Atorvastatin Calcium (Tab) LIPITOR 20 MG Take 1 Tab by mouth every day.        Carvedilol (Tab) COREG 25 MG Take 0.5 Tabs by mouth 2 times a day, with meals.        Clopidogrel Bisulfate (Tab) PLAVIX  75 MG TAKE ONE TABLET BY MOUTH EVERY DAY        Ergocalciferol (Cap) DRISDOL 26158 UNITS Take 1 Cap by mouth every 7 days.        Ferrous Sulfate (Tab) Iron 325 (65 FE) MG Take 1 Tab by mouth every Monday, Wednesday, and Friday.        Fluorometholone (Suspension) FML 0.1 % Place  in left eye every day.        HydroCHLOROthiazide (Tab) HYDRODIURIL 25 MG TK 1 T PO  ONCE D        Irbesartan (Tab) AVAPRO 150 MG Take 1 Tab by mouth every day.        Isosorbide Mononitrate (TABLET SR 24 HR) IMDUR 60 MG Take 1 Tab by mouth every day.        Levothyroxine Sodium (Tab) SYNTHROID 137 MCG Take 1 Tab by mouth Every morning on an empty stomach.        Nitroglycerin (SL Tab) NITROSTAT 0.4 MG Place 1 Tab under tongue as needed for Chest Pain.        Ranolazine (TABLET SR 12 HR) RANEXA 500 MG TAKE 1 TABLET BY MOUTH TWICE DAILY        Tamsulosin HCl (Cap) FLOMAX 0.4 MG TAKE  ONE CAPSULE BY MOUTH EVERY MORNING 30 MINUTES AFTER BREAKFAST        Terazosin HCl (Cap) HYTRIN 2 MG TAKE 1 CAPSULE BY MOUTH EVERY DAY        .                 Medicines prescribed today were sent to:     [id number] - [place], [place] - [address]    [address] [place] [address]    Phone: [phone] Fax: [phone]    Open 24 Hours?: No      Medication refill instructions:       If your prescription bottle indicates you have medication refills left, it is not necessary to call your provider’s office. Please contact your pharmacy and they will refill your medication.    If your prescription bottle indicates you do not have any refills left, you may request refills at any time through one of the following ways: The online [employer] system (except Urgent Care), by calling your provider’s office, or by asking your pharmacy to contact your provider’s office with a refill request. Medication refills are processed only during regular business hours and may not be available until the next business day. Your provider may request additional information  or to have a follow-up visit with you prior to refilling your medication.   *Please Note: Medication refills are assigned a new Rx number when refilled electronically. Your pharmacy may indicate that no refills were authorized even though a new prescription for the same medication is available at the pharmacy. Please request the medicine by name with the pharmacy before contacting your provider for a refill.        Other Notes About Your Plan     Patient is enrolled in [facility] with Dr. Mccray    Pt goes Skip           [employer] Access Code: Activation code not generated  Current [employer] Status: Active

## 2017-01-30 NOTE — Clinical Note
[facility]   [address]  [place] [address]  Phone: [phone]  Fax: [phone]              Erica Fisher  3/17/1933    Encounter Date: 1/30/2017    Andrew Basilio M.D.          PROGRESS NOTE:  Subjective:   Erica Fisher is a 83 y.o. male who presents today  for follow-up of coronary disease with stents to his circumflex and obtuse marginal arteries in 2010, a history of a type A dissection of the aorta in 2007, chronic angina, and an episode of unexplained syncope. He had an unexplained episode of syncope last fall and has had a loop recorder implanted since October. Review of transmissions Mr. Ayala indicates no heart block or VT. He's had no further bouts of syncope. The patient having difficulty with his memory and is now in assisted living.    Past Medical History   Diagnosis Date   • Dyslipidemia, goal LDL below 100    • Hypothyroidism    • Bifid sternum    • CKD (chronic kidney disease) stage 3, GFR 30-59 ml/min    • Angina effort (CMS-HCC)    • Vitamin d deficiency    • Closed Colles' fracture    • Paroxysmal atrial fibrillation (CMS-HCC)    • Anemia    • CAD (coronary artery disease)    • AAA (abdominal aortic aneurysm) (CMS-HCC)      dissection with repair Type A   • Valvular heart disease      MR/AR   • Myocardial infarct (CMS-HCC) 2000   • Hemorrhagic disorder (CMS-HCC)    • Hypertension      Dr. Bloch pt states bp is running high     Past Surgical History   Procedure Laterality Date   • Colonoscopy  6/2010     in [place], normal   • Hernia repair     • Cholecystectomy     • Other cardiac surgery       AORTIC DISSECTION REPAIR.   • Neck mass excision  10/31/2014     Performed by Nathaniel Cavanaugh M.D. at SURGERY [facility] ORS   • Aaa with stent graft     • Recovery  6/17/2016     Procedure: CATH LAB-[facility] WITH DYLON-STEPHY;  Surgeon: Recoveryonly Surgery;  Location: SURGERY PRE-POST PROC UNIT [facility];  Service:    • Other       Dr. Castro,  muscle reattachment sternum   • Other       stenting   • Recovery  7/1/2016     Procedure: CATH LAB-[facility] W/POSSIBLE-VERONIQUE;  Surgeon: Recoveryonly Surgery;  Location: SURGERY PRE-POST PROC UNIT [facility];  Service:      Family History   Problem Relation Age of Onset   • Non-contributory Mother      very little history, no family history   • Cancer Mother      cancer of the jaw   • Cancer Maternal Grandmother 79     worked to death     History   Smoking status   • Former Smoker   • Types: Pipe   • Quit date: 07/06/1983   Smokeless tobacco   • Never Used     Allergies   Allergen Reactions   • Colchicine Diarrhea and Vomiting     Diarrhea, n/v   • Ibuprofen      Outpatient Encounter Prescriptions as of 1/30/2017   Medication Sig Dispense Refill   • amlodipine (NORVASC) 5 MG Tab TAKE ONE TABLET BY MOUTH TWICE DAILY 180 Tab 1   • vitamin D, Ergocalciferol, (DRISDOL) 28974 UNITS Cap capsule Take 1 Cap by mouth every 7 days. 12 Cap 4   • tamsulosin (FLOMAX) 0.4 MG capsule TAKE  ONE CAPSULE BY MOUTH EVERY MORNING 30 MINUTES AFTER BREAKFAST 90 Cap 3   • clopidogrel (PLAVIX) 75 MG Tab TAKE ONE TABLET BY MOUTH EVERY DAY 90 Tab 3   • levothyroxine (SYNTHROID) 137 MCG Tab Take 1 Tab by mouth Every morning on an empty stomach. 90 Tab 2   • carvedilol (COREG) 25 MG Tab Take 0.5 Tabs by mouth 2 times a day, with meals. 180 Tab 0   • hydrochlorothiazide (HYDRODIURIL) 25 MG Tab TK 1 T PO  ONCE D  3   • atorvastatin (LIPITOR) 20 MG Tab Take 1 Tab by mouth every day. 90 Tab 3   • RANEXA 500 MG TABLET SR 12 HR TAKE 1 TABLET BY MOUTH TWICE DAILY 60 Tab 3   • nitroglycerin (NITROSTAT) 0.4 MG SL Tab Place 1 Tab under tongue as needed for Chest Pain. 25 Tab 6   • isosorbide mononitrate SR (IMDUR) 60 MG TABLET SR 24 HR Take 1 Tab by mouth every day. 90 Tab 3   • terazosin (HYTRIN) 2 MG Cap TAKE 1 CAPSULE BY MOUTH EVERY DAY 90 Cap 3   • allopurinol (ZYLOPRIM) 100 MG Tab Take 1 Tab by mouth every day. 90 Tab 3   • irbesartan (AVAPRO) 150 MG Tab Take 1  "Tab by mouth every day. 90 Tab 3   • fluorometholone (FML) 0.1 % Suspension Place  in left eye every day.  0   • aspirin EC (ECOTRIN) 81 MG TBEC Take 81 mg by mouth every day.     • Ferrous Sulfate (IRON) 325 (65 FE) MG TABS Take 1 Tab by mouth every Monday, Wednesday, and Friday.     • Ascorbic Acid (VITAMIN C) 1000 MG TABS Take 1 Tab by mouth every day. Dr. Bloch 30 Tab 0     No facility-administered encounter medications on file as of 1/30/2017.     Review of Systems   Constitutional: Positive for malaise/fatigue.   Respiratory: Negative for shortness of breath.    Cardiovascular: Positive for chest pain. Negative for palpitations.   Gastrointestinal: Negative for nausea and vomiting.   Neurological: Negative for loss of consciousness and weakness.   Endo/Heme/Allergies: Does not bruise/bleed easily.   Psychiatric/Behavioral: Positive for memory loss. Negative for depression.        Objective:   /60 mmHg  Pulse 66  Ht 1.702 m (5' 7.01\")  Wt 77.111 kg (170 lb)  BMI 26.62 kg/m2  SpO2 97%    Physical Exam   Constitutional: He is oriented to person, place, and time. He appears well-developed and well-nourished. No distress.   HENT:   Head: Atraumatic.   Eyes: Conjunctivae and EOM are normal. Pupils are equal, round, and reactive to light.   Neck: Neck supple. No JVD present.   Cardiovascular: Normal rate, regular rhythm and intact distal pulses.    Murmur heard.   Systolic murmur is present with a grade of 2/6    Decrescendo diastolic murmur is present with a grade of 2/6   Pulmonary/Chest: Effort normal and breath sounds normal. No respiratory distress. He has no wheezes. He has no rales.   Abdominal: Soft. There is no tenderness.   Musculoskeletal: He exhibits no edema.   Neurological: He is alert and oriented to person, place, and time.   Skin: Skin is warm and dry. He is not diaphoretic.       Assessment:     1. Nonrheumatic aortic valve insufficiency     2. Non-rheumatic mitral regurgitation     3. " Essential hypertension     4. Paroxysmal atrial fibrillation (CMS-[facility])     5. Angina effort (CMS-[facility])     6. Dyslipidemia, goal LDL below 100     7. Hx of repair of dissecting thoracic aortic aneurysm, [place] type A     8. Stented coronary artery         Medical Decision Making:  Today's Assessment / Status / Plan:     Cardiac-wise he is doing well. His murmur of aortic insufficiency is unchanged. There is no evidence of volume overload on exam and he's had no angina at all. Blood pressures under good control. By echo last May, he was noted to have progression of his aortic insufficiency which is now severe. Because of his advanced age and comorbidities including memory loss and advanced kidney disease, he would be a poor candidate for any type of intervention.  He had an episode of unexplained lapses of consciousness. There is no evidence of any heart block on his loop recorder to indicate he needs a pacemaker. Continue current medications and return in 6 months      Evelin Mccray M.D.  [address]  [address]  [place] [address]  VIA In Basket

## 2017-02-06 NOTE — TELEPHONE ENCOUNTER
From: Erica Fisher  To: Evelin Mccray M.D.  Sent: 2/3/2017 9:55 PM PST  Subject: Non-Urgent Medical Question    Welcome home Dr. Mccray. Well, Dr. Basilio was moving quickly , as usual and I know even LESS THAN I DID BEFORE THE OTHER DAY. I believe Dr. Basilio was most pleased but had some questions that never even got ANSWERED, let alone asked. I am certain Dr. Basilio was pleased but do have questions. Should we talk?  Thanks much  Skip

## 2017-02-23 NOTE — PROGRESS NOTES
Subjective:      Erica Fisher is a 83 y.o. male who presents from the office of Dr. Mccray, for consultation, with a history of an isolated episode of transient confusion back in October, 2015.    SOREN East is a pleasant 83-year-old right-handed  gentleman who was initially usual state of health when he suffered from an event of confusion. He was sitting in a intersection, suddenly realized that he was crawling into the backseat of the car in front of him. He hit the brake appropriately. Over the next interval of time, he has only intermittent memories. He did eventually drive home, made no mistakes, did not get lost, and evidently had a full conversation with his cardiologist's office. He remembers only intermittent components of this. He then drove to [facility], remembers only the phone call that he had with his son, and then began to recall things more consistently when he arrived at his cardiologist's office. Observe his evidently did not notice any altered personality, atypical behavior, etc. Through all of this, he denied any involuntary movements, there was no documentation of tonic/clonic activity, he had not bitten his tongue, and was never incontinent. The recovery was fairly complete and quick, he denied postictal changes of malaise, headache or myalgias. There were no premonitory symptoms just prior to the event starting. Since then, he has had no event recurrences, he denies any history of similar events.    The complex partial seizure inventory is otherwise completely negative. Full workup, he did visit his cardiologist, Dr. Almaguer, a loop recorder was placed a little over one month ago, and there was no documentation of significant arrhythmia when he followed up at short interval ago. Additional workup has not been done, but neurologic consultation was requested.    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 fib, dyslipidemia, and BPH, there is no history of CVA, seizures, migraine, MS, neurodegenerative disease, blood dyscrasia, liver disease, or autoimmune disease. There is no surgical history of note from my standpoint though he does have a loop recorder in place. No one in the family that he is aware of his ever suffered from similar symptoms, CVA, seizure, MS, or neurodegenerative disease. 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    He is on quite a list of medications, electronic health record was reviewed in full, including baby aspirin daily, Plavix 75 mg daily, Synthroid, Norvasc, Flomax, Coreg, Lipitor, and the rest as per the electronic health record, of no clinical significance from a neurologic standpoint.    Review of Systems   Constitutional: Negative for malaise/fatigue.   Neurological: Negative for sensory change, seizures, loss of consciousness and headaches.   Psychiatric/Behavioral: Negative for depression and memory loss.   All other systems reviewed and are negative.       Objective:     /56 mmHg  Pulse 64  Temp(Src) 36.7 °C (98.1 °F)  Resp 14  Ht 1.702 m (5' 7.01\")  Wt 75.751 kg (167 lb)  BMI 26.15 kg/m2  SpO2 98%     Physical Exam    He appears in no acute distress. Clean and appropriately dressed, he is quite cooperative. Vital signs are stable. There is no malar rash or temporal tenderness. The neck is supple, carotid pulses are present without asymmetry, a loud bruit is heard over the right, softer over the left, there is also a referred murmur bilaterally. There is no lower extremity edema. Cardiac evaluation reveals a murmur at left sternal border with radiation to the neck.    He is fully oriented, there is no aphasia, agnosia, apraxia, or inattention. Mental " processing speed is a little slowed, he can be a little perseverative at times.    PERRLA/EOMI, visual fields are full, there is no facial asymmetry, sensory loss, or bulbar dysfunction. There is no hypophonia or bradykinesia. The tongue and uvular midline, shoulder shrug and head rotation are intact.    There is no tremor, asterixis, or drift. Tone is normal, strength is 5/5 throughout. Reflexes are present at all points, the toes are downgoing.    He stands easily, stride length is maintained, he does not shuffle. Still, he is a little unsteady when he walks, but tandem walking is still intact. Toe and heel walking as well are done without issue. There is no appendicular dystaxia, repetitive movements with the hands, fingers and feet are intact and symmetric.    Sensory exam is intact to vibration and temperature, Romberg is absent.     Assessment/Plan:     1. Transient confusion  I would be a little concerned about a complex partial seizure occurring, resulting in this more sustained, intermittent confusional episode. I suppose vascular insufficiency with unilateral hemispheric hypoperfusion might also result in something like this, even though there was no arrhythmia demonstrated with his loop monitor subsequent to the event itself. With a history of PAF, he certainly is at risk. Carotid ultrasound studies as well as an EEG will be requested. I discussed the case with Dr. Basilio's office, evidently his loop monitor is MRI compatible, so brain imaging will be performed. He will monitor any event recurrences in the interim. Fortunately, he is more than 3 months out after the event itself, so on the state [facility], he is legally safe to drive.    Face-to-face time was spent reviewing all of the above with the patient. Rationale for my diagnostic recommendations was also reviewed. He felt quite comfortable with all of the above. I will follow-up with him after study results are available, we will call him earlier if  significant pathology is discovered.    - REFERRAL TO NEURODIAGNOSTICS (EEG,EP,EMG/NCS/DBS) Modality Requested: EEG-Video  - US-CAROTID DOPPLER; Future    Time: Evaluation 60 minutes for exam come review, discussion, and education  Discussion: As mentioned in the assessment, over 60% of the time spent face-to-face counseling and coordinating care

## 2017-02-23 NOTE — MR AVS SNAPSHOT
"Erica Fisher   2017 1:00 PM   Office Visit   MRN: [id number]    Department:  [facility]   Dept Phone:  [phone]    Description:  Male : 3/17/1933   Provider:  Jose Levy M.D.           Reason for Visit     Establish Care Altered consciousness       Allergies as of 2017     Allergen Noted Reactions    Colchicine 2014   Diarrhea, Vomiting    Diarrhea, n/v    Ibuprofen 2011         You were diagnosed with     Transient confusion   [545793]  -  Primary       Vital Signs     Blood Pressure Pulse Temperature Respirations Height Weight    150/56 mmHg 64 36.7 °C (98.1 °F) 14 1.702 m (5' 7.01\") 75.751 kg (167 lb)    Body Mass Index Oxygen Saturation Smoking Status             26.15 kg/m2 98% Former Smoker         Basic Information     Date Of Birth Sex Race Ethnicity Preferred Language    3/17/1933 Male White Non- English      Your appointments     May 05, 2017 10:15 AM   Follow Up Visit with Sin Park M.D.   [facility] ([address])    [address] [place] [address] [address]  [facility] [address] [phone]           You will be receiving a confirmation call a few days before your appointment from our automated call confirmation system.            May 23, 2017 10:20 AM   Follow Up Visit with Jose Levy M.D.   [facility] (--)    [address] [place] [address], [address]  [facility] [address] [phone]           You will be receiving a confirmation call a few days before your appointment from our automated call confirmation system.              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 (CMS-HCC) I25.111 High  2007 - Present    Hx of repair of dissecting thoracic aortic aneurysm, [place] type A Z98.890, Z86.79 Medium  2007 " - Present    Idiopathic atrophic hypothyroidism E03.4 Low  Unknown - Present    Essential hypertension I10 High  Unknown - Present    Gouty arthritis M10.9 Low  11/20/2012 - Present    Angina effort (CMS-HCC) I20.8 High  12/4/2012 - Present    Aortic regurgitation (Chronic) I35.1 Medium  Unknown - Present    Mitral regurgitation (Chronic) I34.0 Medium  Unknown - Present    Paroxysmal atrial fibrillation (CMS-HCC) (Chronic) I48.0 Medium  Unknown - Present    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 Medium  2/15/2013 - Present    Vitamin D deficiency disease E55.9 Low  Unknown - Present    Stented coronary artery Z95.5   4/7/2014 - Present    Gout M10.9 Low  2/3/2015 - Present    Pulmonary HTN (CMS-HCC) I27.2 Low  5/11/2015 - Present    MEDICAL HOME  Low  5/13/2015 - Present    Pernicious anemia D51.0 Low  6/22/2015 - Present    Vitamin B 12 deficiency E53.8 Low  4/29/2016 - Present    Bradycardia R00.1 High  5/5/2016 - Present    Atherosclerosis of native coronary artery of native heart with angina pectoris (CMS-HCC) I25.119   7/15/2016 - Present    Chronic kidney disease, stage IV (severe) (CMS-HCC) N18.4   11/11/2016 - Present    Secondary hyperparathyroidism of renal origin (CMS-HCC) N25.81   11/11/2016 - Present    Benign prostatic hyperplasia with lower urinary tract symptoms N40.1   11/11/2016 - Present      Health Maintenance        Date Due Completion Dates    IMM PNEUMOCOCCAL 65+ (ADULT) HIGH/HIGHEST RISK SERIES (2 of 2 - PPSV23) 2/13/2017 12/19/2016    IMM DTaP/Tdap/Td Vaccine (1 - Tdap) 12/1/2017 (Originally 3/17/1952) ---    IMM ZOSTER VACCINE 12/1/2017 (Originally 3/17/1993) ---    COLONOSCOPY 6/6/2020 6/6/2010 (Prv Comp)    Override on 6/6/2010: Previously completed (in [place], normal)            Current Immunizations     13-VALENT PCV PREVNAR 12/19/2016    Influenza LAIV (Nasal) 11/16/2012, 10/22/2011    Influenza TIV (IM) 10/2/2014, 11/1/2013    Influenza Vaccine Adult HD 12/19/2016, 9/23/2015     Pneumococcal Vaccine (UF)Historical Data 3/1/2010      Below and/or attached are the medications your provider expects you to take. Review all of your home medications and newly ordered medications with your provider and/or pharmacist. Follow medication instructions as directed by your provider and/or pharmacist. Please keep your medication list with you and share with your provider. Update the information when medications are discontinued, doses are changed, or new medications (including over-the-counter products) are added; and carry medication information at all times in the event of emergency situations     Allergies:  COLCHICINE - Diarrhea,Vomiting     IBUPROFEN - (reactions not documented)               Medications  Valid as of: February 23, 2017 -  2:08 PM    Generic Name Brand Name Tablet Size Instructions for use    Allopurinol (Tab) ZYLOPRIM 100 MG Take 1 Tab by mouth every day.        AmLODIPine Besylate (Tab) NORVASC 5 MG TAKE ONE TABLET BY MOUTH TWICE DAILY        Ascorbic Acid (Tab) Vitamin C 1000 MG Take 1 Tab by mouth every day. Dr. Bloch        Aspirin (Tablet Delayed Response) ECOTRIN 81 MG Take 81 mg by mouth every day.        Atorvastatin Calcium (Tab) LIPITOR 20 MG Take 1 Tab by mouth every day.        Carvedilol (Tab) COREG 25 MG Take 0.5 Tabs by mouth 2 times a day, with meals.        Clopidogrel Bisulfate (Tab) PLAVIX 75 MG TAKE ONE TABLET BY MOUTH EVERY DAY        Ergocalciferol (Cap) DRISDOL 79370 UNITS Take 1 Cap by mouth every 7 days.        Ferrous Sulfate (Tab) Iron 325 (65 FE) MG Take 1 Tab by mouth every Monday, Wednesday, and Friday.        Fluorometholone (Suspension) FML 0.1 % Place  in left eye every day.        HydroCHLOROthiazide (Tab) HYDRODIURIL 25 MG TK 1 T PO  ONCE D        Irbesartan (Tab) AVAPRO 150 MG Take 1 Tab by mouth every day.        Isosorbide Mononitrate (TABLET SR 24 HR) IMDUR 60 MG Take 1 Tab by mouth every day.        Levothyroxine Sodium (Tab) SYNTHROID 137 MCG  Take 1 Tab by mouth Every morning on an empty stomach.        Nitroglycerin (SL Tab) NITROSTAT 0.4 MG Place 1 Tab under tongue as needed for Chest Pain.        Ranolazine (TABLET SR 12 HR) RANEXA 500 MG TAKE 1 TABLET BY MOUTH TWICE DAILY        Tamsulosin HCl (Cap) FLOMAX 0.4 MG TAKE  ONE CAPSULE BY MOUTH EVERY MORNING 30 MINUTES AFTER BREAKFAST        Terazosin HCl (Cap) HYTRIN 2 MG TAKE 1 CAPSULE BY MOUTH EVERY DAY        .                 Medicines prescribed today were sent to:     [id number] - [place], [place] - [address]    [address] [facility] [place] [address]    Phone: [phone] Fax: [phone]    Open 24 Hours?: No      Medication refill instructions:       If your prescription bottle indicates you have medication refills left, it is not necessary to call your provider’s office. Please contact your pharmacy and they will refill your medication.    If your prescription bottle indicates you do not have any refills left, you may request refills at any time through one of the following ways: The online [employer] system (except Urgent Care), by calling your provider’s office, or by asking your pharmacy to contact your provider’s office with a refill request. Medication refills are processed only during regular business hours and may not be available until the next business day. Your provider may request additional information or to have a follow-up visit with you prior to refilling your medication.   *Please Note: Medication refills are assigned a new Rx number when refilled electronically. Your pharmacy may indicate that no refills were authorized even though a new prescription for the same medication is available at the pharmacy. Please request the medicine by name with the pharmacy before contacting your provider for a refill.        Your To Do List     Future Labs/Procedures Complete By Expires    US-CAROTID DOPPLER  As directed 2/23/2018      Referral     A referral request has been sent to our  patient care coordination department. Please allow 3-5 business days for us to process this request and contact you either by phone or mail. If you do not hear from us by the 5th business day, please call us at [phone].        Other Notes About Your Plan     Patient is enrolled in [facility] with Dr. Mccray    Pt goes Skip           [employer] Access Code: Activation code not generated  Current [employer] Status: Active

## 2017-02-23 NOTE — TELEPHONE ENCOUNTER
Called Dr. Levy. He needs to know if the patient's loop recorder is MRI compatible (Device is a [employer] Reveal Linq-serial #[id number]).    Per [employer] website, the patient's device is MRI compatible. Also spoke with pacer clinic and verified this information.    Called Dr. Levy back and advised him of the above.    VISH RN

## 2017-02-23 NOTE — TELEPHONE ENCOUNTER
----- Message from Yoana Vaughn sent at 2/23/2017  1:55 PM PST -----  Regarding: Doctor wants call back asap about doing MRI  FERMIN/Angélica Levy at [phone] wants a call back asap. Patient has a Loop Recorder and he wants to find out if it's okay to do an MRI on the patient.

## 2017-03-04 NOTE — TELEPHONE ENCOUNTER
From: Erica Fisher  To: Evelin Mccray M.D.  Sent: 3/3/2017 12:34 PM PST  Subject: RE:blood pressure drop    As you check up, please let me know what would, AFTER THIS MUCH TIME, make the alarm go off? Feel our/your/mine/ concern is valid. COULD OLE have set it off by standing on the lever? REALLY?  [facility]  Best  Skip  ----- Message -----  From: Evelin Mccray M.D.  Sent: 3/3/2017 11:56 AM PST  To: Erica Fisher  Subject: blood pressure drop    I will follow-up with them on this. Glad they are watching you.

## 2017-03-06 NOTE — TELEPHONE ENCOUNTER
From: Erica Fisher  To: Evelin Mccray M.D.  Sent: 3/6/2017 12:06 PM PST  Subject: RE:blood pressure drop    Dr.. Mccray. Is it normal that the people have not followed up to you as to why we have no info as to my heart rate drop last week? Am I being overly jumpy? Thank you  Skip  ----- Message -----  From: Evelin Mccray M.D.  Sent: 3/3/2017 11:56 AM PST  To: Erica Fisher  Subject: blood pressure drop    I will follow-up with them on this. Glad they are watching you.

## 2017-03-08 NOTE — PROGRESS NOTES
"Type of monitoring: Remote    : Clutter    Date of Transmission: 3-3-2017     Type of device: Implantable Loop Recorder    Battery status: OK      Episodes: one SB episode while sleeping (4:50am), rates ~ 39-50 bpm (<3 seconds); s/w pt \"feels fine\", offers no complaitns; reviewed with JE nurse; no changes continue to monitor  "

## 2017-03-17 NOTE — TELEPHONE ENCOUNTER
From: Erica Fisher  To: Evelin Mccray M.D.  Sent: 3/17/2017 8:13 AM PDT  Subject: RE:buzzing and clicking sounds    Thank you Dr. Mccray. I Shall call and try for appt. May I refer this conversations?    Skip  ----- Message -----  From: Evelin Mccray M.D.  Sent: 3/16/2017 5:22 PM PDT  To: Erica Fisher  Subject: buzzing and clicking sounds    Sometimes that indicates that the blood pressure is getting a little low. Often it is actually due to hearing changes. If the blood pressure is staying at the same I suggest an evaluation with Dr. Cavanaugh, the ear nose and throat physician who did your neck surgery.

## 2017-03-27 NOTE — PROGRESS NOTES
Type of monitoring: Remote    : DaWanda    Date of Transmission: 3-    Type of device: Implantable Loop Recorder                Battery status: Ok    Episodes: Remote monitoring reviewed with Dr. Morelos. Presenting rhythm at 04:50am, rate ~40-55 bpm while sleeping. Per Dr. Morelos, continue to monitor. No changes.

## 2017-03-27 NOTE — TELEPHONE ENCOUNTER
----- Message from Renetta Muñiz sent at 3/27/2017  2:16 PM PDT -----  Regarding: has question about his loop recorder  JEREMIAH/Lisbeth      He has question about his loop recorder. He said he had an issue a few months ago with his loop recorder going off during the night. He said no one called him back to check if everything was fine. He wants to make sure it is still working, or if it needs to be checked. He can be reached at [phone].

## 2017-04-03 NOTE — TELEPHONE ENCOUNTER
----- Message from Cookie Cheek sent at 4/3/2017  9:50 AM PDT -----  Regarding: ranexa cost is too high  Contact: [phone]  FERMIN/david    Pt calling to ask what RS wants to do on Ranexa - cost $260.  Please call pt at [phone].

## 2017-04-07 NOTE — PROGRESS NOTES
"Subjective:      Erica Fisher is a 84 y.o. male who presents with CKD IV Follow-Up            HPI  84 year old with a history of HTN, CAD, aortic dissection who has CKD stage IV    1. CKD stage III - Cr relatively stable, no uremic symptoms  2. HTN - SBP 150s, taking medications as prescribed, has not been checking BP regularly lately  3. Secondary hyperparathyroidism - , Vit D controlled  4. Vit D deficiency - 85, currently q7d     Review of Systems   Constitutional: Negative for fever and chills.   Cardiovascular: Negative for chest pain and palpitations.          Objective:     /60 mmHg  Pulse 55  Temp(Src) 36.7 °C (98 °F) (Temporal)  Resp 14  Ht 1.803 m (5' 11\")  Wt 72.576 kg (160 lb)  BMI 22.33 kg/m2     Physical Exam   Constitutional: He is oriented to person, place, and time. He appears well-developed and well-nourished.   Cardiovascular: Normal rate and regular rhythm.    Pulmonary/Chest: Effort normal and breath sounds normal.   Neurological: He is alert and oriented to person, place, and time.   Skin: Skin is warm and dry.   Psychiatric: He has a normal mood and affect. His behavior is normal.               Assessment/Plan:     1. Chronic kidney disease, stage IV (severe) (CMS-[facility])  Continue to follow CKD labs, no uremic symptoms    - BASIC METABOLIC PANEL; Future  - CBC WITHOUT DIFFERENTIAL; Future  - PTH INTACT (PTH ONLY); Future  - VITAMIN D,25 HYDROXY; Future  - MICROALBUMIN CREAT RATIO URINE; Future    2. Essential hypertension  SBP 150s, taking medications, recommended following up with home readings    3. Vitamin D deficiency disease  Reduce to q14 days, as Vit D level 85        "

## 2017-04-07 NOTE — MR AVS SNAPSHOT
"Erica Fisher   2017 1:00 PM   Office Visit   MRN: [id number]    Department:  [facility]   Dept Phone:  [phone]    Description:  Male : 3/17/1933   Provider:  Sin Park M.D.           Reason for Visit     Follow-Up           Allergies as of 2017     Allergen Noted Reactions    Colchicine 2014   Diarrhea, Vomiting    Diarrhea, n/v    Ibuprofen 2011         You were diagnosed with     Chronic kidney disease, stage IV (severe) (CMS-[facility])   [585.4.ICD-9-CM]       Essential hypertension   [3070190]       Vitamin D deficiency disease   [455548]         Vital Signs     Blood Pressure Pulse Temperature Respirations Height Weight    150/60 mmHg 55 36.7 °C (98 °F) (Temporal) 14 1.803 m (5' 11\") 72.576 kg (160 lb)    Body Mass Index Smoking Status                22.33 kg/m2 Former Smoker          Basic Information     Date Of Birth Sex Race Ethnicity Preferred Language    3/17/1933 Male White Non- English      Your appointments     2017  2:30 PM   ELECTROENCEPHALOGRAPHY with NEURODIAGNOSTIC LAB [facility] (--)    [address], [facility] [address]  [facility] [id number] [phone]           Limit caffeine. Clean, dry hair, no gels, oils, or hairsprays. If testing for seizures or spells, please allow no more than 4 hours of sleep the night before the exam (sleep 12am-4am).            May 23, 2017 10:20 AM   Follow Up Visit with Jose Levy M.D.   [facility] (--)    [address], [facility] [address]  [facility] [address]   [phone]           You will be receiving a confirmation call a few days before your appointment from our automated call confirmation system.              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 (CMS-[facility]) I25.111 High  " 7/1/2007 - Present    Hx of repair of dissecting thoracic aortic aneurysm, Dudley type A Z98.890, Z86.79 Medium  2/23/2007 - Present    Idiopathic atrophic hypothyroidism E03.4 Low  Unknown - Present    Essential hypertension I10 High  Unknown - Present    Gouty arthritis M10.9 Low  11/20/2012 - Present    Angina effort (CMS-HCC) I20.8 High  12/4/2012 - Present    Aortic regurgitation (Chronic) I35.1 Medium  Unknown - Present    Mitral regurgitation (Chronic) I34.0 Medium  Unknown - Present    Paroxysmal atrial fibrillation (CMS-HCC) (Chronic) I48.0 Medium  Unknown - Present    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 Medium  2/15/2013 - Present    Vitamin D deficiency disease E55.9 Low  Unknown - Present    Stented coronary artery Z95.5   4/7/2014 - Present    Gout M10.9 Low  2/3/2015 - Present    Pulmonary HTN (CMS-HCC) I27.2 Low  5/11/2015 - Present    MEDICAL HOME  Low  5/13/2015 - Present    Pernicious anemia D51.0 Low  6/22/2015 - Present    Vitamin B 12 deficiency E53.8 Low  4/29/2016 - Present    Bradycardia R00.1 High  5/5/2016 - Present    Atherosclerosis of native coronary artery of native heart with angina pectoris (CMS-HCC) I25.119   7/15/2016 - Present    Chronic kidney disease, stage IV (severe) (CMS-HCC) N18.4   11/11/2016 - Present    Secondary hyperparathyroidism of renal origin (CMS-HCC) N25.81   11/11/2016 - Present    Benign prostatic hyperplasia with lower urinary tract symptoms N40.1   11/11/2016 - Present      Health Maintenance        Date Due Completion Dates    IMM PNEUMOCOCCAL 65+ (ADULT) HIGH/HIGHEST RISK SERIES (2 of 2 - PPSV23) 2/13/2017 12/19/2016    IMM DTaP/Tdap/Td Vaccine (1 - Tdap) 12/1/2017 (Originally [birth year]/17/1952) ---    IMM ZOSTER VACCINE 12/1/2017 (Originally 3/17/1993) ---    COLONOSCOPY 6/6/2020 6/6/2010 (Prv Comp)    Override on 6/6/2010: Previously completed (in [place], normal)            Current Immunizations     13-VALENT PCV PREVNAR 12/19/2016    Influenza LAIV  (Nasal) 11/16/2012, 10/22/2011    Influenza TIV (IM) 10/2/2014, 11/1/2013    Influenza Vaccine Adult HD 12/19/2016, 9/23/2015    Pneumococcal Vaccine (UF)Historical Data 3/1/2010      Below and/or attached are the medications your provider expects you to take. Review all of your home medications and newly ordered medications with your provider and/or pharmacist. Follow medication instructions as directed by your provider and/or pharmacist. Please keep your medication list with you and share with your provider. Update the information when medications are discontinued, doses are changed, or new medications (including over-the-counter products) are added; and carry medication information at all times in the event of emergency situations     Allergies:  COLCHICINE - Diarrhea,Vomiting     IBUPROFEN - (reactions not documented)               Medications  Valid as of: April 07, 2017 -  1:22 PM    Generic Name Brand Name Tablet Size Instructions for use    Allopurinol (Tab) ZYLOPRIM 100 MG Take 1 Tab by mouth every day.        AmLODIPine Besylate (Tab) NORVASC 5 MG TAKE ONE TABLET BY MOUTH TWICE DAILY        Ascorbic Acid (Tab) Vitamin C 1000 MG Take 1 Tab by mouth every day. Dr. Bloch        Aspirin (Tablet Delayed Response) ECOTRIN 81 MG Take 81 mg by mouth every day.        Atorvastatin Calcium (Tab) LIPITOR 20 MG Take 1 Tab by mouth every day.        Carvedilol (Tab) COREG 25 MG Take 0.5 Tabs by mouth 2 times a day, with meals.        Clopidogrel Bisulfate (Tab) PLAVIX 75 MG TAKE ONE TABLET BY MOUTH EVERY DAY        Ergocalciferol (Cap) DRISDOL 52115 UNITS Take 1 Cap by mouth every 7 days.        Ferrous Sulfate (Tab) Iron 325 (65 FE) MG Take 1 Tab by mouth every Monday, Wednesday, and Friday.        Fluorometholone (Suspension) FML 0.1 % Place  in left eye every day.        HydroCHLOROthiazide (Tab) HYDRODIURIL 25 MG TK 1 T PO  ONCE D        Irbesartan (Tab) AVAPRO 150 MG TAKE ONE TABLET BY MOUTH EVERY DAY         Isosorbide Mononitrate (TABLET SR 24 HR) IMDUR 60 MG Take 1 Tab by mouth every day.        Levothyroxine Sodium (Tab) SYNTHROID 137 MCG Take 1 Tab by mouth Every morning on an empty stomach.        Nitroglycerin (SL Tab) NITROSTAT 0.4 MG Place 1 Tab under tongue as needed for Chest Pain.        Ranolazine (TABLET SR 12 HR) RANEXA 500 MG Take 1 Tab by mouth 2 times a day.        Tamsulosin HCl (Cap) FLOMAX 0.4 MG TAKE  ONE CAPSULE BY MOUTH EVERY MORNING 30 MINUTES AFTER BREAKFAST        Terazosin HCl (Cap) HYTRIN 2 MG TAKE 1 CAPSULE BY MOUTH EVERY DAY        .                 Medicines prescribed today were sent to:     [id number] - [place], [place] - [address]    [address] [facility] [place] [address]    Phone: [phone] Fax: [phone]    Open 24 Hours?: No      Medication refill instructions:       If your prescription bottle indicates you have medication refills left, it is not necessary to call your provider’s office. Please contact your pharmacy and they will refill your medication.    If your prescription bottle indicates you do not have any refills left, you may request refills at any time through one of the following ways: The online Get Satisfaction system (except Urgent Care), by calling your provider’s office, or by asking your pharmacy to contact your provider’s office with a refill request. Medication refills are processed only during regular business hours and may not be available until the next business day. Your provider may request additional information or to have a follow-up visit with you prior to refilling your medication.   *Please Note: Medication refills are assigned a new Rx number when refilled electronically. Your pharmacy may indicate that no refills were authorized even though a new prescription for the same medication is available at the pharmacy. Please request the medicine by name with the pharmacy before contacting your provider for a refill.        Your To Do List     Future  Labs/Procedures Complete By Expires    BASIC METABOLIC PANEL  As directed 10/5/2017    CBC WITHOUT DIFFERENTIAL  As directed 10/5/2017    MICROALBUMIN CREAT RATIO URINE  As directed 10/7/2017    PTH INTACT (PTH ONLY)  As directed 4/8/2018    VITAMIN D,25 HYDROXY  As directed 10/8/2017      Other Notes About Your Plan     Patient is enrolled in [facility] with Dr. Mccray    Pt goes Skip           MyChart Access Code: Activation code not generated  Current Ter[employer] Status: Active

## 2017-04-26 NOTE — MR AVS SNAPSHOT
Erica Fisher   2017 2:30 PM   Non-Provider Visit   MRN: [id number]    Department:  [facility]   Dept Phone:  [phone]    Description:  Male : 3/17/1933   Provider:  NEURODIAGNOSTIC LAB [facility]           Allergies as of 2017     Allergen Noted Reactions    Colchicine 2014   Diarrhea, Vomiting    Diarrhea, n/v    Ibuprofen 2011         You were diagnosed with     Transient confusion   [354484]         Vital Signs     Smoking Status                   Former Smoker           Basic Information     Date Of Birth Sex Race Ethnicity Preferred Language    3/17/1933 Male White Non- English      Your appointments     May 11, 2017 10:15 AM   Adult Draw/Collection with LAB [facility]   [facility] (--)    [address]  [facility] [id number] [phone]            May 23, 2017 10:20 AM   Follow Up Visit with Jose Levy M.D.   [facility] (--)    [address], [address]  [facility] [address] [phone]           You will be receiving a confirmation call a few days before your appointment from our automated call confirmation system.              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 Low  Unknown - Present    Essential hypertension I10 High  Unknown - Present    Gouty arthritis M10.9 Low  2012 - Present    Angina effort (CMS-HCC) I20.8 High  2012 - Present    Aortic regurgitation (Chronic) I35.1 Medium  Unknown - Present    Mitral regurgitation (Chronic) I34.0 Medium  Unknown - Present    Paroxysmal atrial fibrillation (CMS-HCC) (Chronic) I48.0 Medium   Unknown - Present    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 Medium  2/15/2013 - Present    Vitamin D deficiency disease E55.9 Low  Unknown - Present    Stented coronary artery Z95.5   4/7/2014 - Present    Gout M10.9 Low  2/3/2015 - Present    Pulmonary HTN (CMS-HCC) I27.2 Low  5/11/2015 - Present    MEDICAL HOME  Low  5/13/2015 - Present    Pernicious anemia D51.0 Low  6/22/2015 - Present    Vitamin B 12 deficiency E53.8 Low  4/29/2016 - Present    Bradycardia R00.1 High  5/5/2016 - Present    Atherosclerosis of native coronary artery of native heart with angina pectoris (CMS-HCC) I25.119   7/15/2016 - Present    Chronic kidney disease, stage IV (severe) (CMS-HCC) N18.4   11/11/2016 - Present    Secondary hyperparathyroidism of renal origin (CMS-HCC) N25.81   11/11/2016 - Present    Benign prostatic hyperplasia with lower urinary tract symptoms N40.1   11/11/2016 - Present      Health Maintenance        Date Due Completion Dates    IMM PNEUMOCOCCAL 65+ (ADULT) HIGH/HIGHEST RISK SERIES (2 of 2 - PPSV23) 2/13/2017 12/19/2016    IMM DTaP/Tdap/Td Vaccine (1 - Tdap) 12/1/2017 (Originally [birth year]/17/1952) ---    IMM ZOSTER VACCINE 12/1/2017 (Originally 3/17/1993) ---    COLONOSCOPY 6/6/2020 6/6/2010 (Prv Comp)    Override on 6/6/2010: Previously completed (in [place], normal)            Current Immunizations     13-VALENT PCV PREVNAR 12/19/2016    Influenza LAIV (Nasal) 11/16/2012, 10/22/2011    Influenza TIV (IM) 10/2/2014, 11/1/2013    Influenza Vaccine Adult HD 12/19/2016, 9/23/2015    Pneumococcal Vaccine (UF)Historical Data 3/1/2010      Below and/or attached are the medications your provider expects you to take. Review all of your home medications and newly ordered medications with your provider and/or pharmacist. Follow medication instructions as directed by your provider and/or pharmacist. Please keep your medication list with you and share with your provider. Update the information when medications are  discontinued, doses are changed, or new medications (including over-the-counter products) are added; and carry medication information at all times in the event of emergency situations     Allergies:  COLCHICINE - Diarrhea,Vomiting     IBUPROFEN - (reactions not documented)               Medications  Valid as of: April 26, 2017 -  3:53 PM    Generic Name Brand Name Tablet Size Instructions for use    Allopurinol (Tab) ZYLOPRIM 100 MG Take 1 Tab by mouth every day.        AmLODIPine Besylate (Tab) NORVASC 5 MG TAKE ONE TABLET BY MOUTH TWICE DAILY        Ascorbic Acid (Tab) Vitamin C 1000 MG Take 1 Tab by mouth every day. Dr. Bloch        Aspirin (Tablet Delayed Response) ECOTRIN 81 MG Take 81 mg by mouth every day.        Atorvastatin Calcium (Tab) LIPITOR 20 MG Take 1 Tab by mouth every day.        Carvedilol (Tab) COREG 25 MG Take 0.5 Tabs by mouth 2 times a day, with meals.        Clopidogrel Bisulfate (Tab) PLAVIX 75 MG TAKE ONE TABLET BY MOUTH EVERY DAY        Ergocalciferol (Cap) DRISDOL 89542 UNITS Take 1 Cap by mouth every 7 days.        Ferrous Sulfate (Tab) Iron 325 (65 FE) MG Take 1 Tab by mouth every Monday, Wednesday, and Friday.        Fluorometholone (Suspension) FML 0.1 % Place  in left eye every day.        HydroCHLOROthiazide (Tab) HYDRODIURIL 25 MG TK 1 T PO  ONCE D        Irbesartan (Tab) AVAPRO 150 MG TAKE ONE TABLET BY MOUTH EVERY DAY        Isosorbide Mononitrate (TABLET SR 24 HR) IMDUR 60 MG Take 1 Tab by mouth every day.        Levothyroxine Sodium (Tab) SYNTHROID 137 MCG Take 1 Tab by mouth Every morning on an empty stomach.        Nitroglycerin (SL Tab) NITROSTAT 0.4 MG Place 1 Tab under tongue as needed for Chest Pain.        Ranolazine (TABLET SR 12 HR) RANEXA 500 MG Take 1 Tab by mouth 2 times a day.        Tamsulosin HCl (Cap) FLOMAX 0.4 MG TAKE  ONE CAPSULE BY MOUTH EVERY MORNING 30 MINUTES AFTER BREAKFAST        Terazosin HCl (Cap) HYTRIN 2 MG TAKE 1 CAPSULE BY MOUTH EVERY DAY        .                  Medicines prescribed today were sent to:     [id number] - [place], [place] - [address]    [address] [place] [address]    Phone: [phone] Fax: [phone]    Open 24 Hours?: No      Medication refill instructions:       If your prescription bottle indicates you have medication refills left, it is not necessary to call your provider’s office. Please contact your pharmacy and they will refill your medication.    If your prescription bottle indicates you do not have any refills left, you may request refills at any time through one of the following ways: The online LAVEGO system (except Urgent Care), by calling your provider’s office, or by asking your pharmacy to contact your provider’s office with a refill request. Medication refills are processed only during regular business hours and may not be available until the next business day. Your provider may request additional information or to have a follow-up visit with you prior to refilling your medication.   *Please Note: Medication refills are assigned a new Rx number when refilled electronically. Your pharmacy may indicate that no refills were authorized even though a new prescription for the same medication is available at the pharmacy. Please request the medicine by name with the pharmacy before contacting your provider for a refill.        Other Notes About Your Plan     Patient is enrolled in [facility] with Dr. Mahendra East           LAVEGO Access Code: [email or web address]  Expires: 5/18/2017  9:58 AM    LAVEGO  A secure, online tool to manage your health information     [employer]’s LAVEGO® is a secure, online tool that connects you to your personalized health information from the privacy of your home -- day or night - making it very easy for you to manage your healthcare. Once the activation process is completed, you can even access your medical information using the LAVEGO norm, which is available  for free in the Apple Carol store or Google Play store.     The News Lens provides the following levels of access (as shown below):   My Chart Features   [facility] Primary [facility] Doctor [facility]  Specialists [facility]  Urgent  Care Non-[facility]  Primary Care  Doctor   Email your healthcare team securely and privately 24/7 X X X    Manage appointments: schedule your next appointment; view details of past/upcoming appointments X      Request prescription refills. X      View recent personal medical records, including lab and immunizations X X X X   View health record, including health history, allergies, medications X X X X   Read reports about your outpatient visits, procedures, consult and ER notes X X X X   See your discharge summary, which is a recap of your hospital and/or ER visit that includes your diagnosis, lab results, and care plan. X X       How to register for The News Lens:  1. Go to  [email or web address].  2. Click on the Sign Up Now box, which takes you to the New Member Sign Up page. You will need to provide the following information:  a. Enter your The News Lens Access Code exactly as it appears at the top of this page. (You will not need to use this code after you’ve completed the sign-up process. If you do not sign up before the expiration date, you must request a new code.)   b. Enter your date of birth.   c. Enter your home email address.   d. Click Submit, and follow the next screen’s instructions.  3. Create a The News Lens ID. This will be your The News Lens login ID and cannot be changed, so think of one that is secure and easy to remember.  4. Create a The News Lens password. You can change your password at any time.  5. Enter your Password Reset Question and Answer. This can be used at a later time if you forget your password.   6. Enter your e-mail address. This allows you to receive e-mail notifications when new information is available in The News Lens.  7. Click Sign Up. You can now view your health information.    For assistance  activating your [employer] account, call [phone]

## 2017-04-28 NOTE — TELEPHONE ENCOUNTER
----- Message from Renetta Muñiz sent at 4/28/2017 10:49 AM PDT -----  Regarding: medication question  FERMIN/Vidhi      Patient wants to know if Dr. Basilio wants him to keep taking Ranexa. He can be reached at [phone].

## 2017-04-28 NOTE — TELEPHONE ENCOUNTER
REviewed all meds with pt. Ranexa 1000mg #21 sampl;es provided for pt. He will take 1/2 tab BID. (Ranexa 500mg samples aren't available.)

## 2017-04-28 NOTE — PROCEDURES
DATE OF SERVICE:  04/26/2017    HISTORY OF PRESENT ILLNESS:  The patient is an 84-year-old male with a history   of transient confusion from October 2015.  There were no sequelae.  EEG is   requested to rule out underlying seizure activity.    CONDITION OF RECORDING:  This is a 21-channel, outpatient video, digital EEG   tracing.  Bipolar and referential montages are used.  No activation procedures   are done.  The patient is awake for the tracing.  He is on aspirin, Plavix,   Synthroid, Norvasc, Flomax, Imdur, Coreg, Lipitor, Avapro, Zyloprim, and HCTZ.    TRACING DESCRIPTION:  As the tracing begins, a pattern of generalized slowing   is seen, higher amplitudes over the left hemisphere.  At times, the waveforms   consist of sharply contoured theta activity, where sharp activity is seen as   well, though this is not rhythmic, occurring only in isolation.  The   background otherwise consists of slowing with theta and occasional delta   activity.  No paroxysmal activity is seen, no clear epileptiform activity is   seen.  No recognizable periods of arousal versus drowsiness or sleep are seen.    IMPRESSION:  This is an abnormal EEG for a patient of this age consistent with   bilateral, left greater than right-sided, cerebral dysfunction.  Some   occasional paroxysmal, sharply contoured activity can be seen over the left   parasagittal hemisphere, no change in the patient's activity on video is seen.    No electroencephalographic seizure activity is seen.  Clinical correlation   is suggested.       ____________________________________     MD PERLA PLUMMER / CAMILO    DD:  04/27/2017 17:57:56  DT:  04/27/2017 18:17:45    D#:  [id number]  Job#:  [id number]

## 2017-04-29 NOTE — ED AVS SNAPSHOT
4/30/2017    Erica Fisher  [address] [address]    Dear Erica:    [facility] wants to ensure your discharge home is safe and you or your loved ones have had all of your questions answered regarding your care after you leave the hospital.    Below is a list of resources and contact information should you have any questions regarding your hospital stay, follow-up instructions, or active medical symptoms.    Questions or Concerns Regarding… Contact   Medical Questions Related to Your Discharge  (7 days a week, 8am-5pm) Contact a Nurse Care Coordinator   [phone]   Medical Questions Not Related to Your Discharge  (24 hours a day / 7 days a week)  Contact the Nurse Health Line   [phone]    Medications or Discharge Instructions Refer to your discharge packet   or contact your [facility] Primary Care Provider   [phone]   Follow-up Appointment(s) Schedule your appointment via VIPerks   or contact Scheduling [phone]   Billing Review your statement via VIPerks  or contact Billing [phone]   Medical Records Review your records via VIPerks   or contact Medical Records [phone]     You may receive a telephone call within two days of discharge. This call is to make certain you understand your discharge instructions and have the opportunity to have any questions answered. You can also easily access your medical information, test results and upcoming appointments via the VIPerks free online health management tool. You can learn more and sign up at DokDok/VIPerks. For assistance setting up your VIPerks account, please call [phone].    Once again, we want to ensure your discharge home is safe and that you have a clear understanding of any next steps in your care. If you have any questions or concerns, please do not hesitate to contact us, we are here for you. Thank you for choosing [facility] for your healthcare needs.    Sincerely,    Your [facility] Healthcare Team

## 2017-04-29 NOTE — ED AVS SNAPSHOT
Bokecc Access Code: [email or web address]  Expires: 5/18/2017  9:58 AM    Bokecc  A secure, online tool to manage your health information     [employer]’s Bokecc® is a secure, online tool that connects you to your personalized health information from the privacy of your home -- day or night - making it very easy for you to manage your healthcare. Once the activation process is completed, you can even access your medical information using the Bokecc carol, which is available for free in the Apple Carol store or Google Play store.     Bokecc provides the following levels of access (as shown below):   My Chart Features   [facility] Primary Care Doctor [facility]  Specialists [facility]  Urgent  Care Non-[facility]  Primary Care  Doctor   Email your healthcare team securely and privately 24/7 X X X X   Manage appointments: schedule your next appointment; view details of past/upcoming appointments X      Request prescription refills. X      View recent personal medical records, including lab and immunizations X X X X   View health record, including health history, allergies, medications X X X X   Read reports about your outpatient visits, procedures, consult and ER notes X X X X   See your discharge summary, which is a recap of your hospital and/or ER visit that includes your diagnosis, lab results, and care plan. X X       How to register for Bokecc:  1. Go to  [email or web address].  2. Click on the Sign Up Now box, which takes you to the New Member Sign Up page. You will need to provide the following information:  a. Enter your Bokecc Access Code exactly as it appears at the top of this page. (You will not need to use this code after you’ve completed the sign-up process. If you do not sign up before the expiration date, you must request a new code.)   b. Enter your date of birth.   c. Enter your home email address.   d. Click Submit, and follow the next screen’s instructions.  3. Create a Bokecc ID. This will be your [employer]  login ID and cannot be changed, so think of one that is secure and easy to remember.  4. Create a [employer] password. You can change your password at any time.  5. Enter your Password Reset Question and Answer. This can be used at a later time if you forget your password.   6. Enter your e-mail address. This allows you to receive e-mail notifications when new information is available in [employer].  7. Click Sign Up. You can now view your health information.    For assistance activating your [employer] account, call [phone]

## 2017-04-30 NOTE — DISCHARGE INSTRUCTIONS
Confusion  Confusion is the inability to think with your usual speed or clarity. Confusion may come on quickly or slowly over time. How quickly the confusion comes on depends on the cause. Confusion can be due to any number of causes.  CAUSES   · Concussion, head injury, or head trauma.  · Seizures.  · Stroke.  · Fever.  · Brain tumor.  · Age related decreased brain function (dementia).  · Heightened emotional states like rage or terror.  · Mental illness in which the person loses the ability to determine what is real and what is not (hallucinations).  · Infections such as a urinary tract infection (UTI).  · Toxic effects from alcohol, drugs, or prescription medicines.  · Dehydration and an imbalance of salts in the body (electrolytes).  · Lack of sleep.  · Low blood sugar (diabetes).  · Low levels of oxygen from conditions such as chronic lung disorders.  · Drug interactions or other medicine side effects.  · Nutritional deficiencies, especially niacin, thiamine, vitamin C, or vitamin B.  · Sudden drop in body temperature (hypothermia).  · Change in routine, such as when traveling or hospitalized.  SIGNS AND SYMPTOMS   People often describe their thinking as cloudy or unclear when they are confused. Confusion can also include feeling disoriented. That means you are unaware of where or who you are. You may also not know what the date or time is. If confused, you may also have difficulty paying attention, remembering, and making decisions. Some people also act aggressively when they are confused.   DIAGNOSIS   The medical evaluation of confusion may include:  · Blood and urine tests.  · X-rays.  · Brain and nervous system tests.  · Analyzing your brain waves (electroencephalogram or EEG).  · Magnetic resonance imaging (MRI) of your head.  · Computed tomography (CT) scan of your head.  · Mental status tests in which your health care provider may ask many questions. Some of these questions may seem silly or strange,  but they are a very important test to help diagnose and treat confusion.  TREATMENT   An admission to the hospital may not be needed, but a person with confusion should not be left alone. Stay with a family member or friend until the confusion clears. Avoid alcohol, pain relievers, or sedative drugs until you have fully recovered. Do not drive until directed by your health care provider.  HOME CARE INSTRUCTIONS   What family and friends can do:  · To find out if someone is confused, ask the person to state his or her name, age, and the date. If the person is unsure or answers incorrectly, he or she is confused.  · Always introduce yourself, no matter how well the person knows you.  · Often remind the person of his or her location.  · Place a calendar and clock near the confused person.  · Help the person with his or her medicines. You may want to use a pill box, an alarm as a reminder, or give the person each dose as prescribed.  · Talk about current events and plans for the day.  · Try to keep the environment calm, quiet, and peaceful.  · Make sure the person keeps follow-up visits with his or her health care provider.  PREVENTION   Ways to prevent confusion:  · Avoid alcohol.  · Eat a balanced diet.  · Get enough sleep.  · Take medicine only as directed by your health care provider.  · Do not become isolated. Spend time with other people and make plans for your days.  · Keep careful watch on your blood sugar levels if you are diabetic.  SEEK IMMEDIATE MEDICAL CARE IF:   · You develop severe headaches, repeated vomiting, seizures, blackouts, or slurred speech.  · There is increasing confusion, weakness, numbness, restlessness, or personality changes.  · You develop a loss of balance, have marked dizziness, feel uncoordinated, or fall.  · You have delusions, hallucinations, or develop severe anxiety.  · Your family members think you need to be rechecked.     This information is not intended to replace advice given  to you by your health care provider. Make sure you discuss any questions you have with your health care provider.     Document Released: 01/25/2006 Document Revised: 01/08/2016 Document Reviewed: 01/23/2015  Elsevier Interactive Patient Education ©2016 Elsevier Inc.

## 2017-04-30 NOTE — ED PROVIDER NOTES
"CHIEF COMPLAINT  Chief Complaint   Patient presents with   • ALOC     EMS state that son states that pt has hx of dementia but today worse, pt 100.3F per EMS.   • Fever       HPI  Erica Fisher is a 84 y.o. male who presents  With acute confusion today. He describes it clearly in his own words as walking in his hallway and certainly not remembering where he was. He is currently fully alert and oriented without active confusion. Denies any numbness or weakness. Denies any focal neurologic complaints. Denies specifically headache, change in vision, numbness, weakness, chest pain, shortness of breath, nausea, vomiting, diarrhea, recent illness. No dysuria or hematuria or back pain. Denies any skin changes.    EMS obtained a temperature of 100.3 Fahrenheit however the patient denies any recent illness. No fevers here upon arrival.     Patient does have a history of poor memory and confusion. He has recently been worked up for this with MRI and EEG within the past month. Specifically, EEG was performed on 4/28/2017 for transient confusion. It was found to be slightly abnormal (described as nonspecific \"bilateral left greater than right sided cerebral dysfunction\") however no signs of seizure activity.     MRI on 3/31/2017, also performed for transient confusion, showing:      Impression        1. Age-related cerebral atrophy.    2. Mild to moderate periventricular and subcortical white matter changes consistent with chronic microvascular ischemic gliosis.       REVIEW OF SYSTEMS  See HPI for further details. All other systems are negative.     PAST MEDICAL HISTORY   has a past medical history of Dyslipidemia, goal LDL below 100; Hypothyroidism; Bifid sternum; CKD (chronic kidney disease) stage 3, GFR 30-59 ml/min; Angina effort (CMS-HCC); Vitamin d deficiency; Closed Colles' fracture; Paroxysmal atrial fibrillation (CMS-HCC); Anemia; CAD (coronary artery disease); AAA (abdominal aortic aneurysm) (CMS-HCC); Valvular heart " "disease; Myocardial infarct (CMS-[facility]) (2000); Hemorrhagic disorder (CMS-HCC); and Hypertension.    SOCIAL HISTORY  Social History     Social History Main Topics   • Smoking status: Former Smoker     Types: Pipe     Quit date: 07/06/1983   • Smokeless tobacco: Never Used   • Alcohol Use: 4.2 oz/week     7 Standard drinks or equivalent per week      Comment: 1 glass wine/PM   • Drug Use: No   • Sexual Activity:     Partners: Female       SURGICAL HISTORY   has past surgical history that includes colonoscopy (6/2010); hernia repair; cholecystectomy; other cardiac surgery; neck mass excision (10/31/2014); aaa with stent graft; recovery (6/17/2016); other; other; and recovery (7/1/2016).    CURRENT MEDICATIONS  Home Medications     **Home medications have not yet been reviewed for this encounter**          ALLERGIES  Allergies   Allergen Reactions   • Colchicine Diarrhea and Vomiting     Diarrhea, n/v   • Ibuprofen        PHYSICAL EXAM  VITAL SIGNS: /64 mmHg  Pulse 68  Temp(Src) 37.1 °C (98.8 °F)  Resp 16  Ht 1.803 m (5' 11\")  Wt 72.576 kg (160 lb)  BMI 22.33 kg/m2     Pulse ox interpretation:  93% on room air,I interpret this pulse ox as normal.  Constitutional: Alert in no apparent distress.  HENT: No signs of trauma, Bilateral external ears normal, Nose normal.   Eyes: Pupils are equal and reactive, Conjunctiva normal, Non-icteric.   Neck: Normal range of motion, No tenderness, Supple, No stridor.   Cardiovascular: Regular rate and rhythm, no murmurs.   Thorax & Lungs: Normal breath sounds, No respiratory distress, No wheezing, No chest tenderness.   Abdomen: Bowel sounds normal, Soft, No tenderness, No masses, No pulsatile masses. No peritoneal signs.  Skin: Warm, Dry, No erythema, No rash.   Back: No bony tenderness, No CVA tenderness.   Extremities: Intact distal pulses, No edema, No tenderness, No cyanosis  Musculoskeletal: Good range of motion in all major joints. No tenderness to palpation or major " "deformities noted.   Neurologic: Alert , Normal motor function and gait, Normal sensory function, No focal deficits noted.  Cranial nerves II through XII grossly intact.  Psychiatric: Affect normal, Judgment normal, Mood normal.       DIAGNOSTIC STUDIES / PROCEDURES    EKG   4/29/2017 at 2205  Normal sinus rhythm 67  IA, QTC within normal limits  QRS at 108  Bigeminy present  No ST elevations or depressions  No signs of acute ischemia        LABS  Labs Reviewed   CBC WITH DIFFERENTIAL - Abnormal; Notable for the following:     WBC 4.5 (*)     RBC 3.23 (*)     Hemoglobin 10.4 (*)     Hematocrit 32.0 (*)     MCV 99.1 (*)     MCHC 32.5 (*)     RDW 54.2 (*)     Platelet Count 103 (*)     Lymphocytes 16.50 (*)     Monocytes 14.80 (*)     Lymphs (Absolute) 0.75 (*)     All other components within normal limits   COMP METABOLIC PANEL - Abnormal; Notable for the following:     Potassium 5.7 (*)     Chloride 115 (*)     Co2 16 (*)     Bun 44 (*)     Creatinine 1.84 (*)     Calcium 8.2 (*)     Total Protein 5.8 (*)     All other components within normal limits   ESTIMATED GFR - Abnormal; Notable for the following:     GFR If  43 (*)     GFR If Non  35 (*)     All other components within normal limits   LACTIC ACID   LACTIC ACID   URINALYSIS    Narrative:     Indication for culture:->Emergency Room Patient   URINE CULTURE(NEW)    Narrative:     Indication for culture:->Emergency Room Patient   BLOOD CULTURE    Narrative:     Per Hospital Policy: Only change Specimen Src: to \"Line\" if  specified by physician order.   BLOOD CULTURE    Narrative:     Per Hospital Policy: Only change Specimen Src: to \"Line\" if  specified by physician order.   TROPONIN       RADIOLOGY  DX-CHEST-PORTABLE (1 VIEW)   Final Result         Mild cardiomegaly is again noted. No new infiltrates or consolidations are noted. Some linear opacifications are again identified mainly in the left lung base.          COURSE & MEDICAL " "DECISION MAKING  Pertinent Labs & Imaging studies reviewed. (See chart for details)  84 y.o. Male presenting with a transient episode of confusion. He does not appear confused at this time and is rather calm and pleasant and fully oriented. He remembers the transient confusion episode. It appears from the medical record that he has been worked up for confusion several times in the past including recent MRI and EEG that did not show any acute findings requiring immediate intervention. Nonspecific findings that are likely age-related. There were some concerns of possible infectious etiology and sepsis protocol was initiated however upon the patient's arrival, he did not have an active fever. No signs of respiratory discomfort, dysuria, nausea, vomiting. Laboratory studies were found to be largely unremarkable for any acute findings. Does have chronic hyperkalemia and chronic kidney disease. He was given IV fluid hydration to help with hyperkalemia. No obvious EKG findings of hyperkalemia.    The patient was monitored here without any other episodes of confusion. He remained stable throughout his stay. No emerging signs of sepsis. Uncertain etiology of the patient's confusion episode today. No evidence to suggest ischemic stroke and no indication for CT of the head at this time. No focal neurologic deficits. Patient is calm and cooperative. The patient is stable for discharge and further outpatient management of his transient confusion episodes. He is discharged in stable condition without any active confusion.    The patient will return for worsening symptoms or failure of improvement and is stable at the time of discharge. The patient verbalizes understanding in their own words.    /62 mmHg  Pulse 82  Temp(Src) 37.1 °C (98.8 °F)  Resp 16  Ht 1.803 m (5' 11\")  Wt 72.576 kg (160 lb)  BMI 22.33 kg/m2  SpO2 93%  The patient was referred to primary care where they will receive further BP management.  "     Evelin Mccray M.D.  [address]  [address]  [place] [facility] [place] [address]  [phone]    In 2 days      [facility], Emergency Dept  [address] [address] [phone]    As needed, If symptoms worsen      FINAL IMPRESSION  1. Chronic confusion            Electronically signed by: David Dawson, 4/29/2017 9:14 PM

## 2017-04-30 NOTE — DISCHARGE PLANNING
Medical Social Work    MSW received call from bedside RN requesting assistance with getting pt back to [facility]. MSW called and spoke with Shahla at [employer] who stated they will send a cab to come up pt. Shahla stated they have an agreement with the cab company to safely bring pt back. MSW called and alerted bedside RN that cab will be here shortly. MSW also called and notified the . No other needs at this time.

## 2017-04-30 NOTE — ED NOTES
Chief Complaint   Patient presents with   • ALOC     EMS state that son states that pt has hx of dementia but today worse, pt 100.3F per EMS.   • Fever

## 2017-04-30 NOTE — ED NOTES
Patient given discharge teaching and education. Verbalizes understanding. Given chance to ask questions, all questions answered. Educated patient of signs and symptoms to returned to ER, and educated patient they can return for any concerning symptoms. 0 prescriptions provided to patient. Education given to patient about medications.Patient states they have their belongings. Denies additional needs at this time. Reports pain is well controlled. Patient monitored every hour and PRN for safety and comfort. Patient out of department via wheel chair.

## 2017-05-03 NOTE — TELEPHONE ENCOUNTER
1. Caller Name: Anibal                      Call Back Number: [phone] / email: [email or web address]    2. Message: Patients son called and states that patient is very disoriented and unable to take care of himself.  Per anibal he is requesting a call from you to update you on what is going on and to go over medication. Please advise, thank you.    3. Patient approves office to leave a detailed voicemail/MyChart message: yes

## 2017-05-04 NOTE — TELEPHONE ENCOUNTER
Forms for [employer] completed.  Anibal texted me back on my phone, as requested.  Will speak with him after 5:30.  MRI and carotid imaging is basically okay, some age and known atherosclerotic change.  The EEG was abnormal.

## 2017-05-04 NOTE — TELEPHONE ENCOUNTER
Called and reached voicemail.  Since I feel this is a very important social issue for the patient's safety I have left some detail and given Anibal my cell number.

## 2017-05-05 NOTE — PROGRESS NOTES
Discussed patient's medical situation with his son Anibal Fisher who has his medical power of .  Reviewed medications and note difficulties with reconciliation.  Needs to no longer take the prednisone and increase the allopurinol.

## 2017-05-08 NOTE — TELEPHONE ENCOUNTER
Situation discussed with his son who has his POA.  Have been increasingly concerned about patient's confusion and ability to care for self.  Anibal reports that the pillbox was mostly full and when he was there on a Monday his father was taking pills from the Thursday portion of the pillbox convinced that it was Thursday morning when it was actually Monday afternoon.  Father was quite confused wanting to know where his wife was who he had been  from for more than 20 years. Anibal has hired home health aides to be with him 2 hours morning and evening to assist in meal preparation, personal care and prompt him regarding medications. They are not administering medications. Anibal is looking to help him transition to true assisted living with medication assistance. I am in agreement. I discussed his situation with his cardiologist late fall. His cardiologist was noticing a change in him. Patient did follow-up with me but unfortunately did not make the second follow-up. Have given Anibal dates and times for his next appointments, especially neurology, and asked him to assist his father to schedule a follow-up appointment with me as well.  Letter is completed. Please see other notes.

## 2017-05-15 NOTE — ED AVS SNAPSHOT
5/16/2017    Erica Fisher  [address]  [facility] [address]    Dear Eirca:    [facility] wants to ensure your discharge home is safe and you or your loved ones have had all of your questions answered regarding your care after you leave the hospital.    Below is a list of resources and contact information should you have any questions regarding your hospital stay, follow-up instructions, or active medical symptoms.    Questions or Concerns Regarding… Contact   Medical Questions Related to Your Discharge  (7 days a week, 8am-5pm) Contact a Nurse Care Coordinator   [phone]   Medical Questions Not Related to Your Discharge  (24 hours a day / 7 days a week)  Contact the Nurse Health Line   [phone]    Medications or Discharge Instructions Refer to your discharge packet   or contact your [facility] Primary Care Provider   [phone]   Follow-up Appointment(s) Schedule your appointment via OrdrIt   or contact Scheduling [phone]   Billing Review your statement via OrdrIt  or contact Billing [phone]   Medical Records Review your records via OrdrIt   or contact Medical Records [phone]     You may receive a telephone call within two days of discharge. This call is to make certain you understand your discharge instructions and have the opportunity to have any questions answered. You can also easily access your medical information, test results and upcoming appointments via the OrdrIt free online health management tool. You can learn more and sign up at [email or web address]. For assistance setting up your OrdrIt account, please call [phone].    Once again, we want to ensure your discharge home is safe and that you have a clear understanding of any next steps in your care. If you have any questions or concerns, please do not hesitate to contact us, we are here for you. Thank you for choosing [facility] for your healthcare needs.    Sincerely,    Your [facility] Healthcare Team

## 2017-05-15 NOTE — ED AVS SNAPSHOT
Home Care Instructions                                                                                                                Erica Fisher   MRN: [id number]    Department:  [facility], Emergency Dept   Date of Visit:  5/15/2017            [facility], Emergency Dept    [address]    [place] [address]    Phone:  [phone]      You were seen by     Alberto Metcalf M.D.      Your Diagnosis Was     Vertigo     R42       These are the medications you received during your hospitalization from 05/15/2017 1914 to 05/16/2017 0104     Date/Time Order Dose Route Action    05/15/2017 2139 NS infusion 500 mL 500 mL Intravenous New Bag    05/15/2017 2333 sodium polystyrene (KAYEXALATE) 15 GM/60ML suspension 30 g 30 g Oral Given      Follow-up Information     1. Follow up with Sin Park M.D.. Schedule an appointment as soon as possible for a visit in 1 day.    Specialty:  Nephrology    Contact information    [address]  [address]  [place] [address] [phone]        Medication Information     Review all of your home medications and newly ordered medications with your primary doctor and/or pharmacist as soon as possible. Follow medication instructions as directed by your doctor and/or pharmacist.     Please keep your complete medication list with you and share with your physician. Update the information when medications are discontinued, doses are changed, or new medications (including over-the-counter products) are added; and carry medication information at all times in the event of emergency situations.               Medication List      START taking these medications        Instructions    Morning Afternoon Evening Bedtime    meclizine 25 MG Tabs   Commonly known as:  ANTIVERT        Take 1 Tab by mouth every 6 hours as needed for Dizziness, Nausea/Vomiting or Vertigo.   Dose:  25 mg                          ASK your doctor about these medications        Instructions    Morning Afternoon Evening Bedtime    allopurinol 100 MG Tabs   Commonly known as:  ZYLOPRIM        Doctor's comments:  Dose increased   Take 2 Tabs by mouth every day.   Dose:  200 mg                        amlodipine 2.5 MG Tabs   Commonly known as:  NORVASC        Doctor's comments:  Please note dose change.   The HCTZ is discontinued   Take 1 Tab by mouth every day.   Dose:  2.5 mg                        aspirin EC 81 MG Tbec   Commonly known as:  ECOTRIN        Take 81 mg by mouth every day.   Dose:  81 mg                        atorvastatin 20 MG Tabs   Commonly known as:  LIPITOR        Doctor's comments:  Called to pharmacy [phone]   Take 1 Tab by mouth every day.   Dose:  20 mg                        carvedilol 25 MG Tabs   Commonly known as:  COREG        Doctor's comments:  **Patient requests 90 days supply**   Take 0.5 Tabs by mouth 2 times a day, with meals.   Dose:  12.5 mg                        clopidogrel 75 MG Tabs   Commonly known as:  PLAVIX        TAKE ONE TABLET BY MOUTH EVERY DAY                        fluorometholone 0.1 % Susp   Commonly known as:  FML        Place  in left eye every day.                        irbesartan 150 MG Tabs   Commonly known as:  AVAPRO        TAKE ONE TABLET BY MOUTH EVERY DAY                        Iron 325 (65 FE) MG Tabs        Take 1 Tab by mouth every Monday, Wednesday, and Friday.   Dose:  1 Tab                        isosorbide mononitrate SR 60 MG Tb24   Commonly known as:  IMDUR        Doctor's comments:  Please note dose increase   Take 1 Tab by mouth every day.   Dose:  60 mg                        levothyroxine 137 MCG Tabs   Commonly known as:  SYNTHROID        Take 1 Tab by mouth Every morning on an empty stomach.   Dose:  137 mcg                        nitroglycerin 0.4 MG Subl   Commonly known as:  NITROSTAT        Place 1 Tab under tongue as needed for Chest Pain.   Dose:  0.4 mg                        RANEXA 500 MG Tb12   Generic  drug:  ranolazine        Take 1 Tab by mouth 2 times a day.   Dose:  500 mg                        tamsulosin 0.4 MG capsule   Commonly known as:  FLOMAX        TAKE  ONE CAPSULE BY MOUTH EVERY MORNING 30 MINUTES AFTER BREAKFAST                        terazosin 2 MG Caps   Commonly known as:  HYTRIN        Doctor's comments:  90 day supply   TAKE 1 CAPSULE BY MOUTH EVERY DAY                        Vitamin C 1000 MG Tabs        Take 1 Tab by mouth every day. Dr. Bloch   Dose:  1 Tab                        vitamin D (Ergocalciferol) 90678 UNITS Caps capsule   Commonly known as:  DRISDOL        Take 1 Cap by mouth every 14 days.   Dose:  16482 Units                             Where to Get Your Medications      You can get these medications from any pharmacy     Bring a paper prescription for each of these medications    - meclizine 25 MG Tabs            Procedures and tests performed during your visit     Procedure/Test Number of Times Performed    CBC WITH DIFFERENTIAL 1    CMP 1    EKG (ER) 2    ESTIMATED GFR 1    MR-MRA HEAD-W/O 1    MR-MRA NECK-W/O 1    OLD EKG 1    TROPONIN 1        Discharge Instructions       Benign Positional Vertigo  Take meclizine if needed for spinning dizziness. Stop your medicine irbesartan. Follow-up with Dr. Park or Dr. Najjar tomorrow and asked them to start a new blood pressure medicine.     Vertigo means you feel like you or your surroundings are moving when they are not. Benign positional vertigo is the most common form of vertigo. Benign means that the cause of your condition is not serious. Benign positional vertigo is more common in older adults.  CAUSES   Benign positional vertigo is the result of an upset in the labyrinth system. This is an area in the middle ear that helps control your balance. This may be caused by a viral infection, head injury, or repetitive motion. However, often no specific cause is found.  SYMPTOMS   Symptoms of benign positional vertigo occur when  you move your head or eyes in different directions. Some of the symptoms may include:  · Loss of balance and falls.  · Vomiting.  · Blurred vision.  · Dizziness.  · Nausea.  · Involuntary eye movements (nystagmus).  DIAGNOSIS   Benign positional vertigo is usually diagnosed by physical exam. If the specific cause of your benign positional vertigo is unknown, your caregiver may perform imaging tests, such as magnetic resonance imaging (MRI) or computed tomography (CT).  TREATMENT   Your caregiver may recommend movements or procedures to correct the benign positional vertigo. Medicines such as meclizine, benzodiazepines, and medicines for nausea may be used to treat your symptoms. In rare cases, if your symptoms are caused by certain conditions that affect the inner ear, you may need surgery.  HOME CARE INSTRUCTIONS   · Follow your caregiver's instructions.  · Move slowly. Do not make sudden body or head movements.  · Avoid driving.  · Avoid operating heavy machinery.  · Avoid performing any tasks that would be dangerous to you or others during a vertigo episode.  · Drink enough fluids to keep your urine clear or pale yellow.  SEEK IMMEDIATE MEDICAL CARE IF:   · You develop problems with walking, weakness, numbness, or using your arms, hands, or legs.  · You have difficulty speaking.  · You develop severe headaches.  · Your nausea or vomiting continues or gets worse.  · You develop visual changes.  · Your family or friends notice any behavioral changes.  · Your condition gets worse.  · You have a fever.  · You develop a stiff neck or sensitivity to light.  MAKE SURE YOU:   · Understand these instructions.  · Will watch your condition.  · Will get help right away if you are not doing well or get worse.     This information is not intended to replace advice given to you by your health care provider. Make sure you discuss any questions you have with your health care provider.     Document Released: 09/25/2007 Document  Revised: 03/11/2013 Document Reviewed: 04/11/2016  Elsevier Interactive Patient Education ©2016 [employer]            Patient Information     Patient Information    Following emergency treatment: all patient requiring follow-up care must return either to a private physician or a clinic if your condition worsens before you are able to obtain further medical attention, please return to the emergency room.     Billing Information    At [facility], we work to make the billing process streamlined for our patients.  Our Representatives are here to answer any questions you may have regarding your hospital bill.  If you have insurance coverage and have supplied your insurance information to us, we will submit a claim to your insurer on your behalf.  Should you have any questions regarding your bill, we can be reached online or by phone as follows:  Online: You are able pay your bills online or live chat with our representatives about any billing questions you may have. We are here to help Monday - Friday from 8:00am to 7:30pm and 9:00am - 12:00pm on Saturdays.  Please visit [email or web address]  for more information.   Phone:  [phone] or [phone]    Please note that your emergency physician, surgeon, pathologist, radiologist, anesthesiologist, and other specialists are not employed by [facility] and will therefore bill separately for their services.  Please contact them directly for any questions concerning their bills at the numbers below:     Emergency Physician Services:  [phone]  [facility]:  [phone]  [facility]:  [phone]  [facility]:  [phone]    1. Your final bill may vary from the amount quoted upon discharge if all procedures are not complete at that time, or if your doctor has additional procedures of which we are not aware. You will receive an additional bill if you return to the Emergency  Department at [facility] for suture removal regardless of the facility of which the sutures were placed.     2. Please arrange for settlement of this account at the emergency registration.    3. All self-pay accounts are due in full at the time of treatment.  If you are unable to meet this obligation then payment is expected within 4-5 days.     4. If you have had radiology studies (CT, X-ray, Ultrasound, MRI), you have received a preliminary result during your emergency department visit. Please contact the radiology department [phone] to receive a copy of your final result. Please discuss the Final result with your primary physician or with the follow up physician provided.     Crisis Hotline:  [place] Crisis Hotline:  6-597-RPCKXSY or 1-137.336.1115  [place] Crisis Hotline:    1-853.744.4940 or 027-074-1996         ED Discharge Follow Up Questions    1. In order to provide you with very good care, we would like to follow up with a phone call in the next few days.  May we have your permission to contact you?     YES /  NO    2. What is the best phone number to call you? (       )_____-__________    3. What is the best time to call you?      Morning  /  Afternoon  /  Evening                   Patient Signature:  ____________________________________________________________    Date:  ____________________________________________________________      Your appointments     May 23, 2017 10:20 AM   Follow Up Visit with Jose Levy M.D.   [facility] (--)    [address], [address]  [facility] [id number] [phone]           You will be receiving a confirmation call a few days before your appointment from our automated call confirmation system.

## 2017-05-15 NOTE — ED AVS SNAPSHOT
Resource Guru Access Code: Activation code not generated  Current Resource Guru Status: Active    [employer]  A secure, online tool to manage your health information     [employer]’s Resource Guru® is a secure, online tool that connects you to your personalized health information from the privacy of your home -- day or night - making it very easy for you to manage your healthcare. Once the activation process is completed, you can even access your medical information using the Resource Guru carol, which is available for free in the Apple Carol store or Google Play store.     Resource Guru provides the following levels of access (as shown below):   My Chart Features   [facility] Primary Care Doctor [facility]  Specialists [facility]  Urgent  Care Non-[facility]  Primary Care  Doctor   Email your healthcare team securely and privately 24/7 X X X X   Manage appointments: schedule your next appointment; view details of past/upcoming appointments X      Request prescription refills. X      View recent personal medical records, including lab and immunizations X X X X   View health record, including health history, allergies, medications X X X X   Read reports about your outpatient visits, procedures, consult and ER notes X X X X   See your discharge summary, which is a recap of your hospital and/or ER visit that includes your diagnosis, lab results, and care plan. X X       How to register for Resource Guru:  1. Go to  [email or web address].  2. Click on the Sign Up Now box, which takes you to the New Member Sign Up page. You will need to provide the following information:  a. Enter your Resource Guru Access Code exactly as it appears at the top of this page. (You will not need to use this code after you’ve completed the sign-up process. If you do not sign up before the expiration date, you must request a new code.)   b. Enter your date of birth.   c. Enter your home email address.   d. Click Submit, and follow the next screen’s instructions.  3. Create a Resource Guru ID. This will  be your [employer] login ID and cannot be changed, so think of one that is secure and easy to remember.  4. Create a [employer] password. You can change your password at any time.  5. Enter your Password Reset Question and Answer. This can be used at a later time if you forget your password.   6. Enter your e-mail address. This allows you to receive e-mail notifications when new information is available in [employer].  7. Click Sign Up. You can now view your health information.    For assistance activating your [employer] account, call [phone]

## 2017-05-16 NOTE — ED PROVIDER NOTES
ED Provider Note    Scribed for Alberto Metcalf M.D. by Miguelina Stapleton. 5/15/2017  7:56 PM    Primary care provider: Evelin Mccray M.D.  Means of arrival: EMS  History obtained from: Patient  History limited by: None     CHIEF COMPLAINT  Chief Complaint   Patient presents with   • Dizziness     Patient stood up and patient became dizzy for 20 minutes.       SOREN Fisher is a 84 y.o. male who presents for dizziness onset today. Patient reports dizzy episode was intermittent in nature and lasted for 20 minutes. He states he stood up to go to dinner and sudden onset dizziness. He describes the episode as spinning sensationer. Denies history of vertigo. He reports difficulty with ambulation secondary to dizziness. Denies headache, vomiting, left sided weakness, facial droop, chest pain palpation.  Denies recent cough, sore throat, or fever. Patient reports history of CAD and coronary artery bypass surgery. Denies other focal weakness facial droop or speech difficulty. Symptoms have all resolved at this time. Patient started irbesartan approximately 6 weeks ago.    I reviewed patient charts from 5/11/17 [facility] his work up was for transient confusion, EEG was performed Patient was Anemic at 11.5, renal 2.29, creatine and potassium at 6.4.  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  10+ systems reviewed and negative.      PAST MEDICAL HISTORY  Past Medical History   Diagnosis Date   • Dyslipidemia, goal LDL below 100    • Hypothyroidism    • Bifid sternum    • CKD (chronic kidney disease) stage 3, GFR 30-59 ml/min    • Angina effort  (CMS-HCC)    • Vitamin d deficiency    • Closed Colles' fracture    • Paroxysmal atrial fibrillation (CMS-HCC)    • Anemia    • CAD (coronary artery disease)    • AAA (abdominal aortic aneurysm) (CMS-HCC)      dissection with repair Type A   • Valvular heart disease      MR/AR   • Myocardial infarct (CMS-HCC) 2000   • Hemorrhagic disorder (CMS-HCC)    • Hypertension      Dr. Bloch pt states bp is running high   • Electroencephalogram (EEG) abnormality without seizure        FAMILY HISTORY  Family History   Problem Relation Age of Onset   • Non-contributory Mother      very little history, no family history   • Cancer Mother      cancer of the jaw   • Cancer Maternal Grandmother 79     worked to death       SOCIAL HISTORY  Social History   Substance Use Topics   • Smoking status: Former Smoker     Types: Pipe     Quit date: 07/06/1983   • Smokeless tobacco: Never Used   • Alcohol Use: 4.2 oz/week     7 Standard drinks or equivalent per week      Comment: 1 glass wine/PM     History   Drug Use No       SURGICAL HISTORY  Past Surgical History   Procedure Laterality Date   • Colonoscopy  6/2010     in [place], normal   • Hernia repair     • Cholecystectomy     • Other cardiac surgery       AORTIC DISSECTION REPAIR.   • Neck mass excision  10/31/2014     Performed by Nathaniel Cavanaugh M.D. at [facility]   • Aaa with stent graft     • Recovery  6/17/2016     Procedure: CATH LAB-[facility] WITH DENIS;  Surgeon: Recoveryonly Surgery;  Location: SURGERY PRE-POST PROC UNIT [facility];  Service:    • Other       Dr. Castro, muscle reattachment sternum   • Other       stenting   • Recovery  7/1/2016     Procedure: CATH LAB-C W/POSSIBLE-VERONIQUE;  Surgeon: Recoveryonly Surgery;  Location: SURGERY PRE-POST PROC UNIT [facility] [place] [facility];  Service:        CURRENT MEDICATIONS  Home Medications     Reviewed by Kathryn Barron R.N. (Registered Nurse) on 05/15/17 at 1936  Med List Status: Not Addressed    Medication Last Dose Status     "allopurinol (ZYLOPRIM) 100 MG Tab  Active    amlodipine (NORVASC) 2.5 MG Tab  Active    Ascorbic Acid (VITAMIN C) 1000 MG TABS 4/7/2017 Active    aspirin EC (ECOTRIN) 81 MG TBEC  Active    atorvastatin (LIPITOR) 20 MG Tab 4/7/2017 Active    carvedilol (COREG) 25 MG Tab 4/7/2017 Active    clopidogrel (PLAVIX) 75 MG Tab 4/7/2017 Active    Ferrous Sulfate (IRON) 325 (65 FE) MG TABS  Active    fluorometholone (FML) 0.1 % Suspension  Active    irbesartan (AVAPRO) 150 MG Tab 4/7/2017 Active    isosorbide mononitrate SR (IMDUR) 60 MG TABLET SR 24 HR 4/7/2017 Active    levothyroxine (SYNTHROID) 137 MCG Tab 4/7/2017 Active    nitroglycerin (NITROSTAT) 0.4 MG SL Tab 4/7/2017 Active    RANEXA 500 MG TABLET SR 12 HR 4/7/2017 Active    tamsulosin (FLOMAX) 0.4 MG capsule 4/7/2017 Active    terazosin (HYTRIN) 2 MG Cap 4/7/2017 Active    vitamin D, Ergocalciferol, (DRISDOL) 79274 UNITS Cap capsule  Active                ALLERGIES  Allergies   Allergen Reactions   • Colchicine Diarrhea and Vomiting     Diarrhea, n/v   • Ibuprofen        PHYSICAL EXAM  VITAL SIGNS: /44 mmHg  Pulse 54  Temp(Src) 36.4 °C (97.5 °F)  Resp 20  Ht 1.803 m (5' 11\")  Wt 70.761 kg (156 lb)  BMI 21.77 kg/m2  Reviewed and patient is hypertensive   Constitutional: Well developed, Well nourished, well appearing.  HENT: Normocephalic, atraumatic, bilateral external ears normal, oropharynx moist, No exudates or erythema. TMs are not visible due to ear wax  Eyes: PERRLA, conjunctiva pink, no scleral icterus. No nystagmus   Cardiovascular: Regular rate and rhythm. No  rubs or gallops. Systolic and dystollic murmur at the base  Respiratory: Lungs clear to auscultation bilaterally. No wheezes, rales, or rhonchi.   Abdomen: Abdomen soft, non-tender, non distended.   Skin: No erythema, no rash.   Genitourinary: No costovertebral angle tenderness.   Neurologic: Alert & oriented x 3, cranial nerves 2-12 intact by passive exam. Normal finger to nose, normal " strength and sensation, No focal deficit noted.  Psychiatric: Affect normal, Judgment normal, Mood normal.     DIFFERENTIAL DIAGNOSIS:  myocardial infarction, peripheral vertigo, hyperkalemia     EKG  EKG Interpretation:  Interpreted by me    Rhythm:  Atrial bradycardia with supraventricular bigeminy  Rate: 56  Axis: normal  Ectopy: none  Conduction: normal  ST Segments: no acute change  T Waves: no acute change  Q Waves: none  QRS: winding in left buddle pattern   Clinical Impression: supraventricular bigeminy, atrial bradycardia     RADIOLOGY/PROCEDURES  MR-MRA NECK-W/O   Preliminary Result      MR-MRA HEAD-W/O   Preliminary Result      Preliminary report negative for vascular occlusion or infarct. Discussed with radiologist who reviewed prior ultrasound of carotids and stated there may have been a right sided dissection that is not evident on the MR angiogram.    LABORATORY:  Results for orders placed or performed during the hospital encounter of 05/15/17   CBC WITH DIFFERENTIAL   Result Value Ref Range    WBC 6.8 4.8 - 10.8 K/uL    RBC 3.38 (L) 4.70 - 6.10 M/uL    Hemoglobin 10.8 (L) 14.0 - 18.0 g/dL    Hematocrit 33.4 (L) 42.0 - 52.0 %    MCV 98.8 (H) 81.4 - 97.8 fL    MCH 32.0 27.0 - 33.0 pg    MCHC 32.3 (L) 33.7 - 35.3 g/dL    RDW 54.0 (H) 35.9 - 50.0 fL    Platelet Count 102 (L) 164 - 446 K/uL    MPV 12.2 9.0 - 12.9 fL    Neutrophils-Polys 68.20 44.00 - 72.00 %    Lymphocytes 17.90 (L) 22.00 - 41.00 %    Monocytes 10.30 0.00 - 13.40 %    Eosinophils 2.80 0.00 - 6.90 %    Basophils 0.40 0.00 - 1.80 %    Immature Granulocytes 0.40 0.00 - 0.90 %    Nucleated RBC 0.00 /100 WBC    Neutrophils (Absolute) 4.65 1.82 - 7.42 K/uL    Lymphs (Absolute) 1.22 1.00 - 4.80 K/uL    Monos (Absolute) 0.70 0.00 - 0.85 K/uL    Eos (Absolute) 0.19 0.00 - 0.51 K/uL    Baso (Absolute) 0.03 0.00 - 0.12 K/uL    Immature Granulocytes (abs) 0.03 0.00 - 0.11 K/uL    NRBC (Absolute) 0.00 K/uL   CMP   Result Value Ref Range    Sodium 135  135 - 145 mmol/L    Potassium 6.3 (H) 3.6 - 5.5 mmol/L    Chloride 110 96 - 112 mmol/L    Co2 20 20 - 33 mmol/L    Anion Gap 5.0 0.0 - 11.9    Glucose 102 (H) 65 - 99 mg/dL    Bun 49 (H) 8 - 22 mg/dL    Creatinine 2.51 (H) 0.50 - 1.40 mg/dL    Calcium 9.5 8.5 - 10.5 mg/dL    AST(SGOT) 11 (L) 12 - 45 U/L    ALT(SGPT) 6 2 - 50 U/L    Alkaline Phosphatase 61 30 - 99 U/L    Total Bilirubin 1.3 0.1 - 1.5 mg/dL    Albumin 3.9 3.2 - 4.9 g/dL    Total Protein 6.6 6.0 - 8.2 g/dL    Globulin 2.7 1.9 - 3.5 g/dL    A-G Ratio 1.4 g/dL   TROPONIN   Result Value Ref Range    Troponin I <0.01 0.00 - 0.04 ng/mL       INTERVENTIONS:  Medications   NS infusion 500 mL (0 mL Intravenous Stopped 5/15/17 9125)   sodium polystyrene (KAYEXALATE) 15 GM/60ML suspension 30 g (30 g Oral Given 5/15/17 7348)    case discussed with Dr. Drake nephrology, [facility] neurology.  Response: No recurrence of vertigo.    COURSE & MEDICAL DECISION MAKING    I reviewed patient charts from 5/11/17 [facility] his work up was for transient confusion, EEG was performed Patient was Anemic at 11.5, renal 2.29, creatine and potassium at 6.4.  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 Patient presents with vertigo episode, he was seen in the ED four days ago for transient confusion. I will order lab work to check his BMP, potassium, renal function, and EEG to check for atrial fibrillation.  Ordered CBC with differential, CMP, Troponin, EKG to evaluate. Patient will be treated with IV Saline lock. He does not want medication for vertigo at this time.     8:43 PM I discussed the patient's case and the above findings with neurology who advised CT of head and neck as well as MRI.     9:12 PM- I reviewed patient's labs, patient creatine and  potassium was high. I will order MR for head and neck.     12:20 AM I discussed the patient's case and the above findings with radiology who informed me MR results were normal.     12:57 AM Recheck: Patient is resting comfortably and reports feeling improved. I updated him on his results, which showed normal findings. I explained that he is now stable for discharge. I advised him to follow up with his primary care provider and to return to the ED for worsening vertigo. He understands and will comply.       This patient presents with brief intermittent episodes of vertigo tonight that appear to be present referral due to vestibular neuronitis or positional vertigo. There is no evidence of vertebrobasilar insufficiency, stroke or cerebellar hemorrhage. Patient has renal insufficiency which was known and new hyperkalemia that is likely due to his new angiotensin blocking antihypertensive..    PLAN:  Discharge Medication List as of 5/16/2017  1:04 AM      START taking these medications    Details   meclizine (ANTIVERT) 25 MG Tab Take 1 Tab by mouth every 6 hours as needed for Dizziness, Nausea/Vomiting or Vertigo., Disp-20 Tab, R-0, Print Rx Paper           DIscontinue irbesartan  Follow-up nephrology tomorrow  Positional vertigo handout given    Sin Park M.D.  [id number]  [address]  [facility] [id number] [phone]    Schedule an appointment as soon as possible for a visit in 1 day        CONDITION: Stable.    FINAL IMPRESSION  1. Vertigo    2. Hyperkalemia    3. Renal insufficiency    4. Essential hypertension        Electronically signed by: Miguelina Stapleton, 5/15/2017 7:56 PM    The note accurately reflects work and decisions made by me.  Alberto Metcalf  5/16/2017  1:20 AM    Electronically signed by: Alberto Metcalf, 5/16/2017 1:20 AM

## 2017-05-16 NOTE — TELEPHONE ENCOUNTER
"1. Caller Name: Erica \"Skip\" Natalia                      Call Back Number: [phone]    2. Message: Pt called, states that he was seen by Dr Metcalf at [facility] this morning (D/C 0300). Dr Metcalf advised pt to call the office to be seen today by Dr Park. I advised that no providers are in the office today, but that I would relay this message to Dr Park. Please call pt to discuss.      3. Patient approves office to leave a detailed voicemail/MyChart message: N/A      "

## 2017-05-16 NOTE — ED NOTES
Patient ambulatory to the bathroom with a slow steady gait and patient updated that he is awaiting MRI and then he will await results.

## 2017-05-16 NOTE — ED NOTES
Patient's son Anibal can be reached at [phone] and called per patient request to update son that patient is here and has no symptoms at this time and awaiting blood work.

## 2017-05-16 NOTE — ED NOTES
Demetria Godfrey called back and didn't locate the wallet on the dining room table. Patient claims that it is under a handkerchief and is at his apartment and wasn't brought to the ED.

## 2017-05-16 NOTE — ED NOTES
Patient now thinks that he left his wallet at his apartment and called Demetria Godfrey and the  has permission from patient to go into his room to find his wallet and will call back after she goes into his apartment and security notified of situation.

## 2017-05-16 NOTE — ED NOTES
Patient given discharge paperwork and verbalized understanding. Patient out of ED ambulatory with himself. Patient in stable condition and vitals stable and no distress noted.

## 2017-05-16 NOTE — DISCHARGE INSTRUCTIONS
Benign Positional Vertigo  Take meclizine if needed for spinning dizziness. Stop your medicine irbesartan. Follow-up with Dr. Park or Dr. Najjar tomorrow and asked them to start a new blood pressure medicine.     Vertigo means you feel like you or your surroundings are moving when they are not. Benign positional vertigo is the most common form of vertigo. Benign means that the cause of your condition is not serious. Benign positional vertigo is more common in older adults.  CAUSES   Benign positional vertigo is the result of an upset in the labyrinth system. This is an area in the middle ear that helps control your balance. This may be caused by a viral infection, head injury, or repetitive motion. However, often no specific cause is found.  SYMPTOMS   Symptoms of benign positional vertigo occur when you move your head or eyes in different directions. Some of the symptoms may include:  · Loss of balance and falls.  · Vomiting.  · Blurred vision.  · Dizziness.  · Nausea.  · Involuntary eye movements (nystagmus).  DIAGNOSIS   Benign positional vertigo is usually diagnosed by physical exam. If the specific cause of your benign positional vertigo is unknown, your caregiver may perform imaging tests, such as magnetic resonance imaging (MRI) or computed tomography (CT).  TREATMENT   Your caregiver may recommend movements or procedures to correct the benign positional vertigo. Medicines such as meclizine, benzodiazepines, and medicines for nausea may be used to treat your symptoms. In rare cases, if your symptoms are caused by certain conditions that affect the inner ear, you may need surgery.  HOME CARE INSTRUCTIONS   · Follow your caregiver's instructions.  · Move slowly. Do not make sudden body or head movements.  · Avoid driving.  · Avoid operating heavy machinery.  · Avoid performing any tasks that would be dangerous to you or others during a vertigo episode.  · Drink enough fluids to keep your urine clear or pale  yellow.  SEEK IMMEDIATE MEDICAL CARE IF:   · You develop problems with walking, weakness, numbness, or using your arms, hands, or legs.  · You have difficulty speaking.  · You develop severe headaches.  · Your nausea or vomiting continues or gets worse.  · You develop visual changes.  · Your family or friends notice any behavioral changes.  · Your condition gets worse.  · You have a fever.  · You develop a stiff neck or sensitivity to light.  MAKE SURE YOU:   · Understand these instructions.  · Will watch your condition.  · Will get help right away if you are not doing well or get worse.     This information is not intended to replace advice given to you by your health care provider. Make sure you discuss any questions you have with your health care provider.     Document Released: 09/25/2007 Document Revised: 03/11/2013 Document Reviewed: 04/11/2016  Room [address] Interactive Patient Education ©2016 [employer]

## 2017-05-16 NOTE — ED NOTES
Patient's MRI screening form filled out and faxed. Patient claims that he had a MRI one week ago outpatient at [facility] and unable to locate that record. MRI called and made aware of patient's cardiac recorder.

## 2017-05-16 NOTE — ED NOTES
Assumed care of patient. Patient complains of dizziness x 20 minutes and now has resolved.  Patient alert and oriented x 4. Patient denies any other complaints at this time. Patient side rails up x 2 and call bell within reach.

## 2017-05-19 NOTE — MR AVS SNAPSHOT
"Erica Fisher   2017 9:00 AM   Office Visit   MRN: [id number]    Department:  [facility]   Dept Phone:  [phone]    Description:  Male : 3/17/1933   Provider:  Sin Park M.D.           Reason for Visit     Follow-Up           Allergies as of 2017     Allergen Noted Reactions    Colchicine 2014   Diarrhea, Vomiting    Diarrhea, n/v    Ibuprofen 2011         You were diagnosed with     Chronic kidney disease, stage IV (severe) (CMS-HCC)   [585.4.ICD-9-CM]       Secondary hyperparathyroidism of renal origin (CMS-HCC)   [154729]       Essential hypertension   [5775766]       Hyperkalemia   [653591]         Vital Signs     Blood Pressure Pulse Temperature Respirations Height Weight    144/60 mmHg 60 36.7 °C (98 °F) (Temporal) 14 1.702 m (5' 7\") 73.936 kg (163 lb)    Body Mass Index Smoking Status                25.52 kg/m2 Former Smoker          Basic Information     Date Of Birth Sex Race Ethnicity Preferred Language    3/17/1933 Male White Non- English      Your appointments     May 23, 2017 10:20 AM   Follow Up Visit with Jose Levy M.D.   [facility] (--)    [address], [address]  [place] [id number] [phone]           You will be receiving a confirmation call a few days before your appointment from our automated call confirmation system.            2017 10:00 AM   Established Patient with Evelin Mccray M.D.   [facility] ([facility])    [address] [place] [address] [address]  [place] [id number] [phone]           You will be receiving a confirmation call a few days before your appointment from our automated call confirmation system.            Sep 01, 2017 10:00 AM   Follow Up Visit with Sin Park M.D.   [facility] ([address] [place] [address])    [address] [place] [address] [id number]  [place] [id number] [phone]           You will be receiving a confirmation call a few days " before your appointment from our automated call confirmation system.              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 Low  Unknown - Present    Essential hypertension I10 High  Unknown - Present    Gouty arthritis M10.9 Low  11/20/2012 - Present    Angina effort (CMS-HCC) I20.8 High  12/4/2012 - Present    Aortic regurgitation (Chronic) I35.1 Medium  Unknown - Present    Mitral regurgitation (Chronic) I34.0 Medium  Unknown - Present    Paroxysmal atrial fibrillation (CMS-HCC) (Chronic) I48.0 Medium  Unknown - Present    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 Medium  2/15/2013 - Present    Vitamin D deficiency disease E55.9 Low  Unknown - Present    Stented coronary artery Z95.5   4/7/2014 - Present    Gout M10.9 Low  2/3/2015 - Present    Pulmonary HTN (CMS-HCC) I27.2 Low  5/11/2015 - Present    MEDICAL HOME  Low  5/13/2015 - Present    Pernicious anemia D51.0 Low  6/22/2015 - Present    Vitamin B 12 deficiency E53.8 Low  4/29/2016 - Present    Bradycardia R00.1 High  5/5/2016 - Present    Atherosclerosis of native coronary artery of native heart with angina pectoris (CMS-HCC) I25.119   7/15/2016 - Present    Chronic kidney disease, stage IV (severe) (CMS-HCC) N18.4   11/11/2016 - Present    Secondary hyperparathyroidism of renal origin (CMS-HCC) N25.81   11/11/2016 - Present    Benign prostatic hyperplasia with lower urinary tract symptoms N40.1   11/11/2016 - Present    Electroencephalogram (EEG) abnormality without seizure R94.01   Unknown - Present      Health Maintenance        Date Due Completion Dates    IMM  PNEUMOCOCCAL 65+ (ADULT) HIGH/HIGHEST RISK SERIES (2 of 2 - PPSV23) 2/13/2017 12/19/2016    IMM DTaP/Tdap/Td Vaccine (1 - Tdap) 12/1/2017 (Originally 3/17/1952) ---    IMM ZOSTER VACCINE 12/1/2017 (Originally 3/17/1993) ---    COLONOSCOPY 6/6/2020 6/6/2010 (Prv Comp)    Override on 6/6/2010: Previously completed (in [place], normal)            Current Immunizations     13-VALENT PCV PREVNAR 12/19/2016    Influenza LAIV (Nasal) 11/16/2012, 10/22/2011    Influenza TIV (IM) 10/2/2014, 11/1/2013    Influenza Vaccine Adult HD 12/19/2016, 9/23/2015    Pneumococcal Vaccine (UF)Historical Data 3/1/2010      Below and/or attached are the medications your provider expects you to take. Review all of your home medications and newly ordered medications with your provider and/or pharmacist. Follow medication instructions as directed by your provider and/or pharmacist. Please keep your medication list with you and share with your provider. Update the information when medications are discontinued, doses are changed, or new medications (including over-the-counter products) are added; and carry medication information at all times in the event of emergency situations     Allergies:  COLCHICINE - Diarrhea,Vomiting     IBUPROFEN - (reactions not documented)               Medications  Valid as of: May 19, 2017 -  9:24 AM    Generic Name Brand Name Tablet Size Instructions for use    Allopurinol (Tab) ZYLOPRIM 100 MG Take 2 Tabs by mouth every day.        AmLODIPine Besylate (Tab) NORVASC 2.5 MG Take 1 Tab by mouth every day.        Ascorbic Acid (Tab) Vitamin C 1000 MG Take 1 Tab by mouth every day. Dr. Bloch        Aspirin (Tablet Delayed Response) ECOTRIN 81 MG Take 81 mg by mouth every day.        Atorvastatin Calcium (Tab) LIPITOR 20 MG Take 1 Tab by mouth every day.        Carvedilol (Tab) COREG 25 MG Take 0.5 Tabs by mouth 2 times a day, with meals.        Clopidogrel Bisulfate (Tab) PLAVIX 75 MG TAKE ONE TABLET BY MOUTH EVERY DAY          Ergocalciferol (Cap) DRISDOL 34383 UNITS Take 1 Cap by mouth every 14 days.        Ferrous Sulfate (Tab) Iron 325 (65 FE) MG Take 1 Tab by mouth every Monday, Wednesday, and Friday.        Fluorometholone (Suspension) FML 0.1 % Place  in left eye every day.        Isosorbide Mononitrate (TABLET SR 24 HR) IMDUR 60 MG Take 1 Tab by mouth every day.        Levothyroxine Sodium (Tab) SYNTHROID 137 MCG Take 1 Tab by mouth Every morning on an empty stomach.        Meclizine HCl (Tab) ANTIVERT 25 MG Take 1 Tab by mouth every 6 hours as needed for Dizziness, Nausea/Vomiting or Vertigo.        Nitroglycerin (SL Tab) NITROSTAT 0.4 MG Place 1 Tab under tongue as needed for Chest Pain.        Ranolazine (TABLET SR 12 HR) RANEXA 500 MG Take 1 Tab by mouth 2 times a day.        Tamsulosin HCl (Cap) FLOMAX 0.4 MG TAKE  ONE CAPSULE BY MOUTH EVERY MORNING 30 MINUTES AFTER BREAKFAST        Terazosin HCl (Cap) HYTRIN 2 MG TAKE 1 CAPSULE BY MOUTH EVERY DAY        .                 Medicines prescribed today were sent to:     [id number] - [place], [place] - [address]    [address] [facility] [place] [address]    Phone: [phone] Fax: [phone]    Open 24 Hours?: No      Medication refill instructions:       If your prescription bottle indicates you have medication refills left, it is not necessary to call your provider’s office. Please contact your pharmacy and they will refill your medication.    If your prescription bottle indicates you do not have any refills left, you may request refills at any time through one of the following ways: The online [employer] system (except Urgent Care), by calling your provider’s office, or by asking your pharmacy to contact your provider’s office with a refill request. Medication refills are processed only during regular business hours and may not be available until the next business day. Your provider may request additional information or to have a follow-up visit with you prior to  refilling your medication.   *Please Note: Medication refills are assigned a new Rx number when refilled electronically. Your pharmacy may indicate that no refills were authorized even though a new prescription for the same medication is available at the pharmacy. Please request the medicine by name with the pharmacy before contacting your provider for a refill.        Your To Do List     Future Labs/Procedures Complete By Expires    BASIC METABOLIC PANEL  As directed 11/16/2017    BASIC METABOLIC PANEL  As directed 11/16/2017    CBC WITHOUT DIFFERENTIAL  As directed 11/16/2017    MICROALBUMIN CREAT RATIO URINE  As directed 11/18/2017    PTH INTACT (PTH ONLY)  As directed 5/20/2018    VITAMIN D,25 HYDROXY  As directed 11/19/2017      Other Notes About Your Plan     Patient is enrolled in [facility] with Dr. Mccray    Pt goes Skip           [employer] Access Code: Activation code not generated  Current [employer] Status: Active

## 2017-05-19 NOTE — PROGRESS NOTES
"Subjective:      Erica Fisher is a 84 y.o. 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 CKD stage III - Cr up to 2.51  2. HTN - SBP 140s  3. Secondary hyperparathyroidism - controlled  4. Hyperkalemia - K 6.3,  Stopped the irbsartan, stopped 5/16/17    Review of Systems   Constitutional: Negative for fever and chills.   Cardiovascular: Negative for chest pain and palpitations.          Objective:     /60 mmHg  Pulse 60  Temp(Src) 36.7 °C (98 °F) (Temporal)  Resp 14  Ht 1.702 m (5' 7\")  Wt 73.936 kg (163 lb)  BMI 25.52 kg/m2     Physical Exam   Constitutional: He is oriented to person, place, and time. He appears well-developed and well-nourished.   Cardiovascular: Normal rate and regular rhythm.    Pulmonary/Chest: Effort normal and breath sounds normal.   Neurological: He is alert and oriented to person, place, and time.   Skin: Skin is warm and dry.   Psychiatric: He has a normal mood and affect. His behavior is normal.               Assessment/Plan:     1. Chronic kidney disease, stage IV (severe) (CMS-HCC)  Check BMP now, follow up labs for CKD at next visit    - CBC WITHOUT DIFFERENTIAL; Future  - BASIC METABOLIC PANEL; Future  - PTH INTACT (PTH ONLY); Future  - VITAMIN D,25 HYDROXY; Future  - MICROALBUMIN CREAT RATIO URINE; Future    2. Secondary hyperparathyroidism of renal origin (CMS-HCC)  Controlled      3. Essential hypertension  BP controlled    4. Hyperkalemia  Stop ARB, recheck BMP, discussed diet        "

## 2017-05-23 NOTE — TELEPHONE ENCOUNTER
"Pt called after being seen in clinic today, 5/23/2017. He stated that he had forgotten to ask about the \"current medicine path\" that he is currently on. Pt seemed very confused and rambled on a bit. Please advise. Thank you! - CR  "

## 2017-05-23 NOTE — PROGRESS NOTES
"Subjective:      Erica Fisher is a 84 y.o. male who presents for quick follow-up, with a history of an isolated episode of transient confusion in October, 2015.    HPI    Since last seen 3 months ago, he has had no event recurrences. He has been in his usual state of health. MRI scan of the brain revealed a mild to moderate degree of chronic atherosclerotic change in both cerebral hemispheres without obvious asymmetry, carotid ultrasound and then MRA of the neck were unremarkable for significant stenosis, MRA of the brain as well was normal. EEG revealed minimal disorganization over the left parasagittal region, no epileptiform or electroencephalographic seizure activity was seen.    He did follow-up with his cardiologist who also felt that an arrhythmia was not the necessary cause of this event. From a cardiac standpoint, he is doing well. Medical, surgical and family histories are reviewed, there are no drug allergies. 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 He is on Plavix, Lipitor, baby aspirin, Zyloprim, Synthroid, the rest as per the electronic health record.    Review of Systems   Constitutional: Negative for malaise/fatigue.   Musculoskeletal: Positive for falls.   Endo/Heme/Allergies: Does not bruise/bleed easily.   Psychiatric/Behavioral: Negative for depression and memory loss.        Objective:     /64 mmHg  Pulse 78  Temp(Src) 36.7 °C (98 °F)  Resp 16  Ht 1.803 m (5' 11\")  Wt 74.844 kg (165 lb)  BMI 23.02 kg/m2  SpO2 98%     Physical Exam    He appears in no acute distress. His vital signs are stable. There is no malar rash. The neck is supple. Cardiac evaluation unremarkable. In quick and cursory fashion, with observation, a complete neurologic examination again reveals no significant deficits.     Assessment/Plan:     1. Transient confusion  A true cause for this isolated event may never be determined, but we did talk about symptoms that " might suggest seizures moving forwards, and if these were to happen with any type of regularity and stereotypic behavior, then we might consider empiric trial of an antiepileptic. He was encouraged to remain active, there are no constraints from a neurologic standpoint as to activities, etc. Hopefully he will not require additional neurologic follow-up. Face-to-face time spent reviewing all of the above.    Time: Evaluation of 20 minutes or exam, review, discussion, and education  Discussion: As mentioned in the assessment, over 80% of the time spent face-to-face counseling and coordinating care

## 2017-05-23 NOTE — PROGRESS NOTES
"Subjective:      Erica Fisher is a 84 y.o. male who presents with Hand Injury          Pt is 83 y/o male who presents with left hand pain after a fall 4 days ago. Pt. Reports that he was trying to maneuver around a the cabinet corner in his home and his \"legs just got caught up with one another\" and he landed falling back onto his back and hitting his hand on the corner. JAKI was alerted and bandaged his hand. He reports that when he took off the bandage today- it was draining some yellow fluid and thought he should have it checked out. He denies any continued bleeding, fevers, or chills. He reports slight pain, but reports that it \"looks worse than what it feels\". He is currently on Plavix for paroxysmal a. Fib, along with stents. Of note he also has hx of CKD stage 5.   Hand Injury  This is a new problem. Episode onset: 4 days ago. The problem occurs constantly. The problem has been gradually worsening. Associated symptoms include joint swelling. Pertinent negatives include no abdominal pain, change in bowel habit, chest pain, chills, congestion, coughing, fever, headaches, myalgias, neck pain, rash, sore throat or vomiting. Exacerbated by: Pressure or use of the hand. He has tried nothing for the symptoms.   Denies any parasthesias, digit pain, or wrist pain- only pain along the skin tear. Of note also denies any head trauma, new back pain, or LOC with the fall.     Review of Systems   Constitutional: Negative for fever and chills.   HENT: Negative for congestion and sore throat.    Eyes: Negative for discharge and redness.   Respiratory: Negative for cough and wheezing.    Cardiovascular: Negative for chest pain and leg swelling.   Gastrointestinal: Negative for vomiting, abdominal pain, diarrhea and change in bowel habit.   Musculoskeletal: Positive for joint pain, falls and joint swelling. Negative for myalgias, back pain and neck pain.   Skin: Negative for itching and rash.   Neurological: Negative for " dizziness, tingling, sensory change, focal weakness, seizures and headaches.          Objective:     /52 mmHg  Pulse 62  Temp(Src) 37.3 °C (99.2 °F)  Resp 14  Wt 74.844 kg (165 lb)  SpO2 94%   PMH:  has a past medical history of Dyslipidemia, goal LDL below 100; Hypothyroidism; Bifid sternum; CKD (chronic kidney disease) stage 3, GFR 30-59 ml/min; Angina effort (CMS-HCC); Vitamin d deficiency; Closed Colles' fracture; Paroxysmal atrial fibrillation (CMS-HCC); Anemia; CAD (coronary artery disease); AAA (abdominal aortic aneurysm) (CMS-HCC); Valvular heart disease; Myocardial infarct (CMS-HCC) (2000); Hemorrhagic disorder (CMS-HCC); Hypertension; Electroencephalogram (EEG) abnormality without seizure; and Encounter for interrogation of cardiac recorder.  MEDS:   Current outpatient prescriptions:   •  meclizine (ANTIVERT) 25 MG Tab, Take 1 Tab by mouth every 6 hours as needed for Dizziness, Nausea/Vomiting or Vertigo., Disp: 20 Tab, Rfl: 0  •  allopurinol (ZYLOPRIM) 100 MG Tab, Take 2 Tabs by mouth every day., Disp: 180 Tab, Rfl: 3  •  amlodipine (NORVASC) 2.5 MG Tab, Take 1 Tab by mouth every day., Disp: 90 Tab, Rfl: 2  •  vitamin D, Ergocalciferol, (DRISDOL) 41812 UNITS Cap capsule, Take 1 Cap by mouth every 14 days., Disp: 6 Cap, Rfl: 0  •  RANEXA 500 MG TABLET SR 12 HR, Take 1 Tab by mouth 2 times a day., Disp: 21 Tab, Rfl: 0  •  tamsulosin (FLOMAX) 0.4 MG capsule, TAKE  ONE CAPSULE BY MOUTH EVERY MORNING 30 MINUTES AFTER BREAKFAST, Disp: 90 Cap, Rfl: 3  •  clopidogrel (PLAVIX) 75 MG Tab, TAKE ONE TABLET BY MOUTH EVERY DAY, Disp: 90 Tab, Rfl: 3  •  levothyroxine (SYNTHROID) 137 MCG Tab, Take 1 Tab by mouth Every morning on an empty stomach., Disp: 90 Tab, Rfl: 2  •  carvedilol (COREG) 25 MG Tab, Take 0.5 Tabs by mouth 2 times a day, with meals., Disp: 180 Tab, Rfl: 0  •  atorvastatin (LIPITOR) 20 MG Tab, Take 1 Tab by mouth every day., Disp: 90 Tab, Rfl: 3  •  nitroglycerin (NITROSTAT) 0.4 MG SL Tab,  Place 1 Tab under tongue as needed for Chest Pain., Disp: 25 Tab, Rfl: 6  •  isosorbide mononitrate SR (IMDUR) 60 MG TABLET SR 24 HR, Take 1 Tab by mouth every day., Disp: 90 Tab, Rfl: 3  •  terazosin (HYTRIN) 2 MG Cap, TAKE 1 CAPSULE BY MOUTH EVERY DAY, Disp: 90 Cap, Rfl: 3  •  fluorometholone (FML) 0.1 % Suspension, Place  in left eye every day., Disp: , Rfl: 0  •  aspirin EC (ECOTRIN) 81 MG TBEC, Take 81 mg by mouth every day., Disp: , Rfl:   •  Ferrous Sulfate (IRON) 325 (65 FE) MG TABS, Take 1 Tab by mouth every Monday, Wednesday, and Friday., Disp: , Rfl:   •  Ascorbic Acid (VITAMIN C) 1000 MG TABS, Take 1 Tab by mouth every day. Dr. Bloch, Disp: 30 Tab, Rfl: 0  ALLERGIES:   Allergies   Allergen Reactions   • Colchicine Diarrhea and Vomiting     Diarrhea, n/v   • Ibuprofen      SURGHX:   Past Surgical History   Procedure Laterality Date   • Colonoscopy  6/2010     in [place], normal   • Hernia repair     • Cholecystectomy     • Other cardiac surgery       AORTIC DISSECTION REPAIR.   • Neck mass excision  10/31/2014     Performed by Nathaniel Cavanaugh M.D. at SURGERY [facility]   • Aaa with stent graft     • Recovery  6/17/2016     Procedure: CATH LAB-[facility] WITH POSSIBLE-STEPHY;  Surgeon: Recoveryonly Surgery;  Location: SURGERY PRE-POST PROC UNIT [facility] [place];  Service:    • Other       Dr. Castro, muscle reattachment sternum   • Other       stenting   • Recovery  7/1/2016     Procedure: CATH LAB-C W/POSSIBLE-VERONIQUE;  Surgeon: Recoveryonly Surgery;  Location: SURGERY PRE-POST PROC UNIT [facility] [place];  Service:      SOCHX:  reports that he quit smoking about 33 years ago. His smoking use included Pipe. He has never used smokeless tobacco. He reports that he drinks about 4.2 oz of alcohol per week. He reports that he does not use illicit drugs.  FH: Family history was reviewed, no pertinent findings to report    Physical Exam   Constitutional: He is oriented to person, place, and time. He appears well-developed and well-nourished.    HENT:   Head: Normocephalic and atraumatic.   Nose: Nose normal.   Mouth/Throat: Oropharynx is clear and moist.   Eyes: EOM are normal. Pupils are equal, round, and reactive to light.   Neck: Normal range of motion. Neck supple.   Cardiovascular:   Irregularly Irregular   Pulmonary/Chest: Effort normal. No respiratory distress.   Musculoskeletal:        Left hand: He exhibits normal range of motion and no tenderness.        Hands:  Small skin tear to the dorsum of the left hand- this was cleaned with hiblens solution- then dressed with Xeroform and wrapped with bandage.   Noted bony tenderness over skin tear.  FROM at the digits and wrist. Diffuse ecchymosis to the dorsum of the hand and proximal digits with edema.    Neurological: He is alert and oriented to person, place, and time.   Psychiatric: He has a normal mood and affect. His behavior is normal.   Vitals reviewed.            XR hand:  No acute fracture identified. If pain persists, recommend repeat imaging in 7 days.  Severe osteoarthritis of the first carpometacarpal joint. Osteopenia      Assessment/Plan:     1. Fall, subsequent encounter  - DX-HAND 3+ LEFT; Future    2. Hand pain, left  - DX-HAND 3+ LEFT; Future    3. Infected skin tear  - DX-HAND 3+ LEFT; Future    4. CKD Stage V    At this time due to renal condition and concern about dementia- oral therapy was avoided- also due to polypharmacy- pt. Is to RTC tomorrow for wound check and dressing change. Pt. Is to keep area dry.  Wound care discussed. F/U with PCP as needed as well.

## 2017-05-23 NOTE — MR AVS SNAPSHOT
"Erica Fisher   2017 10:20 AM   Office Visit   MRN: [id number]    Department:  [facility]   Dept Phone:  [phone]    Description:  Male : 3/17/1933   Provider:  Jose Levy M.D.           Reason for Visit     Follow-Up Pt has had 2 falls recently. He has been hearing clicking in his left ear.       Allergies as of 2017     Allergen Noted Reactions    Colchicine 2014   Diarrhea, Vomiting    Diarrhea, n/v    Ibuprofen 2011         You were diagnosed with     Transient confusion   [[id number]]  -  Primary       Vital Signs     Blood Pressure Pulse Temperature Respirations Height Weight    136/64 mmHg 78 36.7 °C (98 °F) 16 1.803 m (5' 11\") 74.844 kg (165 lb)    Body Mass Index Oxygen Saturation Smoking Status             23.02 kg/m2 98% Former Smoker         Basic Information     Date Of Birth Sex Race Ethnicity Preferred Language    3/17/1933 Male White Non- English      Your appointments     May 25, 2017 10:45 AM   Adult Draw/Collection with LAB [facility]   LAB - [place] (--)    [address]  [place] [address]   [phone]            Alber 15, 2017  1:20 PM   Established Patient with Evelin Mccray M.D.   [address]  [place] [address] [phone]           You will be receiving a confirmation call a few days before your appointment from our automated call confirmation system.            2017 10:00 AM   Established Patient with Sin Park M.D.   [facility] ([address] [place] [address])    [address]  [place] [address]   [phone]           You will be receiving a confirmation call a few days before your appointment from our automated call confirmation system.            Sep 01, 2017 10:00 AM   Follow Up Visit with Sin Park M.D.   [facility] ([address] [place] [address])    [address] [place] [address] [address]  [place] " [address]   [phone]           You will be receiving a confirmation call a few days before your appointment from our automated call confirmation system.              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 Low  Unknown - Present    Essential hypertension I10 High  Unknown - Present    Gouty arthritis M10.9 Low  11/20/2012 - Present    Angina effort (CMS-HCC) I20.8 High  12/4/2012 - Present    Aortic regurgitation (Chronic) I35.1 Medium  Unknown - Present    Mitral regurgitation (Chronic) I34.0 Medium  Unknown - Present    Paroxysmal atrial fibrillation (CMS-HCC) (Chronic) I48.0 Medium  Unknown - Present    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 Medium  2/15/2013 - Present    Vitamin D deficiency disease E55.9 Low  Unknown - Present    Stented coronary artery Z95.5   4/7/2014 - Present    Gout M10.9 Low  2/3/2015 - Present    Pulmonary HTN (CMS-HCC) I27.2 Low  5/11/2015 - Present    MEDICAL HOME  Low  5/13/2015 - Present    Pernicious anemia D51.0 Low  6/22/2015 - Present    Vitamin B 12 deficiency E53.8 Low  4/29/2016 - Present    Bradycardia R00.1 High  5/5/2016 - Present    Atherosclerosis of native coronary artery of native heart with angina pectoris (CMS-HCC) I25.119   7/15/2016 - Present    Chronic kidney disease, stage IV (severe) (CMS-HCC) N18.4   11/11/2016 - Present    Secondary hyperparathyroidism of renal origin (CMS-HCC) N25.81   11/11/2016 - Present    Benign prostatic hyperplasia with lower urinary tract symptoms N40.1   11/11/2016 - Present    Electroencephalogram (EEG) abnormality without seizure R94.01    Unknown - Present      Health Maintenance        Date Due Completion Dates    IMM PNEUMOCOCCAL 65+ (ADULT) HIGH/HIGHEST RISK SERIES (2 of 2 - PPSV23) 2/13/2017 12/19/2016    IMM DTaP/Tdap/Td Vaccine (1 - Tdap) 12/1/2017 (Originally [birth year]17/1952) ---    IMM ZOSTER VACCINE 12/1/2017 (Originally 3/17/1993) ---    COLONOSCOPY 6/6/2020 6/6/2010 (Prv Comp)    Override on 6/6/2010: Previously completed (in [place], normal)            Current Immunizations     13-VALENT PCV PREVNAR 12/19/2016    Influenza LAIV (Nasal) 11/16/2012, 10/22/2011    Influenza TIV (IM) 10/2/2014, 11/1/2013    Influenza Vaccine Adult HD 12/19/2016, 9/23/2015    Pneumococcal Vaccine (UF)Historical Data 3/1/2010      Below and/or attached are the medications your provider expects you to take. Review all of your home medications and newly ordered medications with your provider and/or pharmacist. Follow medication instructions as directed by your provider and/or pharmacist. Please keep your medication list with you and share with your provider. Update the information when medications are discontinued, doses are changed, or new medications (including over-the-counter products) are added; and carry medication information at all times in the event of emergency situations     Allergies:  COLCHICINE - Diarrhea,Vomiting     IBUPROFEN - (reactions not documented)               Medications  Valid as of: May 23, 2017 - 10:40 AM    Generic Name Brand Name Tablet Size Instructions for use    Allopurinol (Tab) ZYLOPRIM 100 MG Take 2 Tabs by mouth every day.        AmLODIPine Besylate (Tab) NORVASC 2.5 MG Take 1 Tab by mouth every day.        Ascorbic Acid (Tab) Vitamin C 1000 MG Take 1 Tab by mouth every day. Dr. Bloch        Aspirin (Tablet Delayed Response) ECOTRIN 81 MG Take 81 mg by mouth every day.        Atorvastatin Calcium (Tab) LIPITOR 20 MG Take 1 Tab by mouth every day.        Carvedilol (Tab) COREG 25 MG Take 0.5 Tabs by mouth 2 times a day, with meals.           Clopidogrel Bisulfate (Tab) PLAVIX 75 MG TAKE ONE TABLET BY MOUTH EVERY DAY        Ergocalciferol (Cap) DRISDOL 75586 UNITS Take 1 Cap by mouth every 14 days.        Ferrous Sulfate (Tab) Iron 325 (65 FE) MG Take 1 Tab by mouth every Monday, Wednesday, and Friday.        Fluorometholone (Suspension) FML 0.1 % Place  in left eye every day.        Isosorbide Mononitrate (TABLET SR 24 HR) IMDUR 60 MG Take 1 Tab by mouth every day.        Levothyroxine Sodium (Tab) SYNTHROID 137 MCG Take 1 Tab by mouth Every morning on an empty stomach.        Meclizine HCl (Tab) ANTIVERT 25 MG Take 1 Tab by mouth every 6 hours as needed for Dizziness, Nausea/Vomiting or Vertigo.        Nitroglycerin (SL Tab) NITROSTAT 0.4 MG Place 1 Tab under tongue as needed for Chest Pain.        Ranolazine (TABLET SR 12 HR) RANEXA 500 MG Take 1 Tab by mouth 2 times a day.        Tamsulosin HCl (Cap) FLOMAX 0.4 MG TAKE  ONE CAPSULE BY MOUTH EVERY MORNING 30 MINUTES AFTER BREAKFAST        Terazosin HCl (Cap) HYTRIN 2 MG TAKE 1 CAPSULE BY MOUTH EVERY DAY        .                 Medicines prescribed today were sent to:     [id number] - [place], [place] - [address]    [address] [facility] [place] [address]    Phone: [phone] Fax: [phone]    Open 24 Hours?: No      Medication refill instructions:       If your prescription bottle indicates you have medication refills left, it is not necessary to call your provider’s office. Please contact your pharmacy and they will refill your medication.    If your prescription bottle indicates you do not have any refills left, you may request refills at any time through one of the following ways: The online [employer] system (except Urgent Care), by calling your provider’s office, or by asking your pharmacy to contact your provider’s office with a refill request. Medication refills are processed only during regular business hours and may not be available until the next business day. Your provider  may request additional information or to have a follow-up visit with you prior to refilling your medication.   *Please Note: Medication refills are assigned a new Rx number when refilled electronically. Your pharmacy may indicate that no refills were authorized even though a new prescription for the same medication is available at the pharmacy. Please request the medicine by name with the pharmacy before contacting your provider for a refill.        Other Notes About Your Plan     Patient is enrolled in [facility] with Dr. Mccray    Pt goes Skip           [employer] Access Code: Activation code not generated  Current [employer] Status: Active

## 2017-05-25 NOTE — TELEPHONE ENCOUNTER
Sent the following message to Dr. Bo; Thank you.  His son Anibal Fisher is his power of . I am communicating with him as well as trying to communicate with Cruzito. I do strongly believe he needs further evaluation and hopefully treatment.  Anibal has hired caretakers to be with him several hours a day and is trying to get him into a true assisted living situation as he is having some major problems now with day-to-day activities such as bathing and showering, food prep and remembering to eat. His medication administration is still a problem, trying to get his cooperation to correct this. Not sure if we can convince him to get the ER though we will be instructing his caregivers to call 911 when another incident is observed.  Also just sent this [employer] message to Cruzito; As I believe Dr. Bo told you, the EEG was abnormal and he feels some of these events may be related to seizures. In that case, treatment with seizure medication would help stabilize these events and prevent them from happening. I am communicating with him as to the best way to proceed. I feel that you may not always realize when the events occur as they are actually more obvious to people dealing with you rather than yourself.  Hope you are feeling well. I am assisting Anibal in filling out paperwork to get assistance from [facility] to help with the financial costs of your current living situation.  Jacob is worried about you and keeps having dreams of you!

## 2017-05-25 NOTE — TELEPHONE ENCOUNTER
----- Message from Jose Levy M.D. sent at 5/23/2017  5:03 PM PDT -----  Evelin  No, clearly, he did not tell me any of this. If this is now happening with  regularity, I would recommend he get into the emergency room and be admitted for more acute evaluation. It will still take time as an outpatient to get another EEG done, at least this way he can be observed while in EEG is done, and he may even require monitoring. It sounds like he is having these events quite frequently and with sustained duration. Thanks for the update.  Jose  ----- Message -----     From: Evelin Mccray M.D.     Sent: 5/23/2017   1:07 PM       To: Jose Levy M.D.    He had an ER visit 5/15/17 for severe confusion.  He was wandering in the hallways at his apartment and knocking on doors trying to find his wife who he had  20 years ago. He was transported to the ER and had a workup but there was no clear etiology. He has had several episodes of continuing confusion and disorientation. Several times when either my  who is his friend or his son has called he has thought it was a different day of the week or different time of day and was taking his medication accordingly.  Obviously, he did not tell you any of this.

## 2017-05-26 NOTE — TELEPHONE ENCOUNTER
From: Erica Fisher  To: Evelin Mccray M.D.  Sent: 5/26/2017 8:28 AM PDT  Subject: RE:i also believe the plan is for not moving    I know and REALLY WISH HE KELTON HAVE A TALK WITH ME PRIOR TO DOING ANYTHING, ANYTHING AT ALL. NOT AS THEY WOULD HAVE YOU THINK SO. But sure do not want to leave you.  Sometimes he is a little taken in by the surroundings. Me Hell, I amm the bad melvin.  kBest to you  XOXOXO  ----- Message -----  From: Evelin Mccray M.D.  Sent: 5/26/2017 8:05 AM PDT  To: Erica Fisher  Subject: i also believe the plan is for not moving    I also believe he is looking for a way to fund where you are staying now. He was favorably impressed with the place.

## 2017-05-26 NOTE — MR AVS SNAPSHOT
Erica Fisher   2017 10:00 AM   Office Visit   MRN: [id number]    Department:  [facility]   Dept Phone:  [phone]    Description:  Male : 3/17/1933   Provider:  Candido Blake PA-C           Reason for Visit     Wound Check wound check on L hand, cat scratch      Allergies as of 2017     Allergen Noted Reactions    Colchicine 2014   Diarrhea, Vomiting    Diarrhea, n/v    Ibuprofen 2011         You were diagnosed with     Visit for wound check   [[id number]]         Vital Signs     Blood Pressure Pulse Temperature Respirations Weight Oxygen Saturation    154/72 mmHg 66 36.7 °C (98 °F) 16 68.493 kg (151 lb) 96%    Smoking Status                   Former Smoker           Basic Information     Date Of Birth Sex Race Ethnicity Preferred Language    3/17/1933 Male White Non- English      Your appointments     Alber 15, 2017  1:20 PM   Established Patient with Evelin Mccray M.D.   [address]  [place] [address]   [phone]           You will be receiving a confirmation call a few days before your appointment from our automated call confirmation system.            2017 10:00 AM   Established Patient with Sin Park M.D.   [facility] ([address] [place] [address])    [facility] [address]  [place] [address]   [phone]           You will be receiving a confirmation call a few days before your appointment from our automated call confirmation system.            Sep 01, 2017 10:00 AM   Follow Up Visit with Sin Park M.D.   [facility] ([address] [place] [address])    [address]  [place] [address]   [phone]           You will be receiving a confirmation call a few days before your appointment from our automated call confirmation system.              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 Low  Unknown - Present    Essential hypertension I10 High  Unknown - Present    Gouty arthritis M10.9 Low  11/20/2012 - Present    Angina effort (CMS-HCC) I20.8 High  12/4/2012 - Present    Aortic regurgitation (Chronic) I35.1 Medium  Unknown - Present    Mitral regurgitation (Chronic) I34.0 Medium  Unknown - Present    Paroxysmal atrial fibrillation (CMS-HCC) (Chronic) I48.0 Medium  Unknown - Present    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 Medium  2/15/2013 - Present    Vitamin D deficiency disease E55.9 Low  Unknown - Present    Stented coronary artery Z95.5   4/7/2014 - Present    Gout M10.9 Low  2/3/2015 - Present    Pulmonary HTN (CMS-HCC) I27.2 Low  5/11/2015 - Present    MEDICAL HOME  Low  5/13/2015 - Present    Pernicious anemia D51.0 Low  6/22/2015 - Present    Vitamin B 12 deficiency E53.8 Low  4/29/2016 - Present    Bradycardia R00.1 High  5/5/2016 - Present    Atherosclerosis of native coronary artery of native heart with angina pectoris (CMS-HCC) I25.119   7/15/2016 - Present    Chronic kidney disease, stage IV (severe) (CMS-HCC) N18.4   11/11/2016 - Present    Secondary hyperparathyroidism of renal origin (CMS-HCC) N25.81   11/11/2016 - Present    Benign prostatic hyperplasia with lower urinary tract symptoms N40.1   11/11/2016 - Present    Electroencephalogram (EEG) abnormality without seizure R94.01   Unknown - Present      Health Maintenance        Date Due Completion Dates    IMM PNEUMOCOCCAL 65+ (ADULT) HIGH/HIGHEST RISK SERIES (2 of 2 - PPSV23) 2/13/2017 12/19/2016    IMM DTaP/Tdap/Td Vaccine (1 - Tdap) 12/1/2017 (Originally [birth year]17/1952)  ---    IMM ZOSTER VACCINE 12/1/2017 (Originally 3/17/1993) ---    COLONOSCOPY 6/6/2020 6/6/2010 (Prv Comp)    Override on 6/6/2010: Previously completed (in [place], normal)            Current Immunizations     13-VALENT PCV PREVNAR 12/19/2016    Influenza LAIV (Nasal) 11/16/2012, 10/22/2011    Influenza TIV (IM) 10/2/2014, 11/1/2013    Influenza Vaccine Adult HD 12/19/2016, 9/23/2015    Pneumococcal Vaccine (UF)Historical Data 3/1/2010      Below and/or attached are the medications your provider expects you to take. Review all of your home medications and newly ordered medications with your provider and/or pharmacist. Follow medication instructions as directed by your provider and/or pharmacist. Please keep your medication list with you and share with your provider. Update the information when medications are discontinued, doses are changed, or new medications (including over-the-counter products) are added; and carry medication information at all times in the event of emergency situations     Allergies:  COLCHICINE - Diarrhea,Vomiting     IBUPROFEN - (reactions not documented)               Medications  Valid as of: May 26, 2017 - 10:16 AM    Generic Name Brand Name Tablet Size Instructions for use    Allopurinol (Tab) ZYLOPRIM 100 MG Take 2 Tabs by mouth every day.        AmLODIPine Besylate (Tab) NORVASC 2.5 MG Take 1 Tab by mouth every day.        Ascorbic Acid (Tab) Vitamin C 1000 MG Take 1 Tab by mouth every day. Dr. Bloch        Aspirin (Tablet Delayed Response) ECOTRIN 81 MG Take 81 mg by mouth every day.        Atorvastatin Calcium (Tab) LIPITOR 20 MG Take 1 Tab by mouth every day.        Carvedilol (Tab) COREG 25 MG Take 0.5 Tabs by mouth 2 times a day, with meals.        Clopidogrel Bisulfate (Tab) PLAVIX 75 MG TAKE ONE TABLET BY MOUTH EVERY DAY        Ergocalciferol (Cap) DRISDOL 52583 UNITS Take 1 Cap by mouth every 14 days.        Ferrous Sulfate (Tab) Iron 325 (65 FE) MG Take 1 Tab by mouth every  Monday, Wednesday, and Friday.        Fluorometholone (Suspension) FML 0.1 % Place  in left eye every day.        Isosorbide Mononitrate (TABLET SR 24 HR) IMDUR 60 MG Take 1 Tab by mouth every day.        Levothyroxine Sodium (Tab) SYNTHROID 137 MCG Take 1 Tab by mouth Every morning on an empty stomach.        Meclizine HCl (Tab) ANTIVERT 25 MG Take 1 Tab by mouth every 6 hours as needed for Dizziness, Nausea/Vomiting or Vertigo.        Nitroglycerin (SL Tab) NITROSTAT 0.4 MG Place 1 Tab under tongue as needed for Chest Pain.        Ranolazine (TABLET SR 12 HR) RANEXA 500 MG Take 1 Tab by mouth 2 times a day.        Tamsulosin HCl (Cap) FLOMAX 0.4 MG TAKE  ONE CAPSULE BY MOUTH EVERY MORNING 30 MINUTES AFTER BREAKFAST        Terazosin HCl (Cap) HYTRIN 2 MG TAKE 1 CAPSULE BY MOUTH EVERY DAY        .                 Medicines prescribed today were sent to:     [id number] - [place], [place] - [address] [facility]    [address] [facility] [address]    Phone: [phone] Fax: [phone]    Open 24 Hours?: No      Medication refill instructions:       If your prescription bottle indicates you have medication refills left, it is not necessary to call your provider’s office. Please contact your pharmacy and they will refill your medication.    If your prescription bottle indicates you do not have any refills left, you may request refills at any time through one of the following ways: The online [employer] system (except Urgent Care), by calling your provider’s office, or by asking your pharmacy to contact your provider’s office with a refill request. Medication refills are processed only during regular business hours and may not be available until the next business day. Your provider may request additional information or to have a follow-up visit with you prior to refilling your medication.   *Please Note: Medication refills are assigned a new Rx number when refilled electronically. Your pharmacy may indicate that no  refills were authorized even though a new prescription for the same medication is available at the pharmacy. Please request the medicine by name with the pharmacy before contacting your provider for a refill.        Other Notes About Your Plan     Patient is enrolled in [facility] with Dr. Mccray    Pt goes Skip           [employer] Access Code: Activation code not generated  Current [employer] Status: Active

## 2017-05-26 NOTE — PROGRESS NOTES
Subjective:      Erica Fisher is a 84 y.o. male who presents with Wound Check            Wound Check  He was originally treated 2 to 3 days ago. Previous treatment included wound cleansing or irrigation. The maximum temperature noted was less than 100.4 F. There has been no drainage from the wound. The redness has improved. The swelling has improved. The pain has improved. He has no difficulty moving the affected extremity or digit.       PMH:  has a past medical history of Dyslipidemia, goal LDL below 100; Hypothyroidism; Bifid sternum; CKD (chronic kidney disease) stage 3, GFR 30-59 ml/min; Angina effort (CMS-HCC); Vitamin d deficiency; Closed Colles' fracture; Paroxysmal atrial fibrillation (CMS-HCC); Anemia; CAD (coronary artery disease); AAA (abdominal aortic aneurysm) (CMS-HCC); Valvular heart disease; Myocardial infarct (CMS-HCC) (2000); Hemorrhagic disorder (CMS-HCC); Hypertension; Electroencephalogram (EEG) abnormality without seizure; and Encounter for interrogation of cardiac recorder.  MEDS:   Current outpatient prescriptions:   •  meclizine (ANTIVERT) 25 MG Tab, Take 1 Tab by mouth every 6 hours as needed for Dizziness, Nausea/Vomiting or Vertigo., Disp: 20 Tab, Rfl: 0  •  allopurinol (ZYLOPRIM) 100 MG Tab, Take 2 Tabs by mouth every day., Disp: 180 Tab, Rfl: 3  •  amlodipine (NORVASC) 2.5 MG Tab, Take 1 Tab by mouth every day., Disp: 90 Tab, Rfl: 2  •  vitamin D, Ergocalciferol, (DRISDOL) 43601 UNITS Cap capsule, Take 1 Cap by mouth every 14 days., Disp: 6 Cap, Rfl: 0  •  RANEXA 500 MG TABLET SR 12 HR, Take 1 Tab by mouth 2 times a day., Disp: 21 Tab, Rfl: 0  •  tamsulosin (FLOMAX) 0.4 MG capsule, TAKE  ONE CAPSULE BY MOUTH EVERY MORNING 30 MINUTES AFTER BREAKFAST, Disp: 90 Cap, Rfl: 3  •  clopidogrel (PLAVIX) 75 MG Tab, TAKE ONE TABLET BY MOUTH EVERY DAY, Disp: 90 Tab, Rfl: 3  •  levothyroxine (SYNTHROID) 137 MCG Tab, Take 1 Tab by mouth Every morning on an empty stomach., Disp: 90 Tab, Rfl: 2  •   carvedilol (COREG) 25 MG Tab, Take 0.5 Tabs by mouth 2 times a day, with meals., Disp: 180 Tab, Rfl: 0  •  atorvastatin (LIPITOR) 20 MG Tab, Take 1 Tab by mouth every day., Disp: 90 Tab, Rfl: 3  •  nitroglycerin (NITROSTAT) 0.4 MG SL Tab, Place 1 Tab under tongue as needed for Chest Pain., Disp: 25 Tab, Rfl: 6  •  isosorbide mononitrate SR (IMDUR) 60 MG TABLET SR 24 HR, Take 1 Tab by mouth every day., Disp: 90 Tab, Rfl: 3  •  terazosin (HYTRIN) 2 MG Cap, TAKE 1 CAPSULE BY MOUTH EVERY DAY, Disp: 90 Cap, Rfl: 3  •  fluorometholone (FML) 0.1 % Suspension, Place  in left eye every day., Disp: , Rfl: 0  •  aspirin EC (ECOTRIN) 81 MG TBEC, Take 81 mg by mouth every day., Disp: , Rfl:   •  Ferrous Sulfate (IRON) 325 (65 FE) MG TABS, Take 1 Tab by mouth every Monday, Wednesday, and Friday., Disp: , Rfl:   •  Ascorbic Acid (VITAMIN C) 1000 MG TABS, Take 1 Tab by mouth every day. Dr. Bloch, Disp: 30 Tab, Rfl: 0  ALLERGIES:   Allergies   Allergen Reactions   • Colchicine Diarrhea and Vomiting     Diarrhea, n/v   • Ibuprofen      SURGHX:   Past Surgical History   Procedure Laterality Date   • Colonoscopy  6/2010     in [place], normal   • Hernia repair     • Cholecystectomy     • Other cardiac surgery       AORTIC DISSECTION REPAIR.   • Neck mass excision  10/31/2014     Performed by Nathaniel Cavanaugh M.D. at SURGERY [facility] ORS   • Aaa with stent graft     • Recovery  6/17/2016     Procedure: CATH LAB-C WITH POSSIBLE-STEPHY;  Surgeon: Recoveryonly Surgery;  Location: SURGERY PRE-POST PROC UNIT [facility];  Service:    • Other       Dr. Castro, muscle reattachment sternum   • Other       stenting   • Recovery  7/1/2016     Procedure: CATH LAB-LHC W/POSSIBLE-VERONIQUE;  Surgeon: Recoveryonly Surgery;  Location: SURGERY PRE-POST PROC UNIT [facility];  Service:      SOCHX:  reports that he quit smoking about 33 years ago. His smoking use included Pipe. He has never used smokeless tobacco. He reports that he drinks about 4.2 oz of alcohol per  week. He reports that he does not use illicit drugs.  FH: family history includes Cancer in his mother; Cancer (age of onset: 79) in his maternal grandmother; Non-contributory in his mother.      Review of Systems   Constitutional: Negative for fever and chills.       Medications, Allergies, and current problem list reviewed today in Epic     Objective:     /72 mmHg  Pulse 66  Temp(Src) 36.7 °C (98 °F)  Resp 16  Wt 68.493 kg (151 lb)  SpO2 96%     Physical Exam   Constitutional: He is oriented to person, place, and time. He appears well-developed and well-nourished. No distress.   HENT:   Head: Normocephalic and atraumatic.   Eyes: Conjunctivae and EOM are normal.   Neck: Normal range of motion. Neck supple.   Cardiovascular: Normal rate, regular rhythm and normal heart sounds.    No murmur heard.  Pulmonary/Chest: Effort normal and breath sounds normal. No respiratory distress. He has no wheezes.   Musculoskeletal:        Left hand: He exhibits tenderness and swelling. He exhibits normal range of motion, no bony tenderness and no deformity. Normal sensation noted. Normal strength noted.        Hands:  Well-healing superficial abrasion on the dorsal left hand. Wound is scabbed over. Mild surrounding erythema. No open lesions or discharge. Patient still has significant bruising from unrelated hand injury. Range of motion and hand, fingers, wrist within normal limits. Normal cap refill. Neurovascularly intact.   Neurological: He is alert and oriented to person, place, and time.   Skin: Skin is warm and dry. He is not diaphoretic.   Psychiatric: He has a normal mood and affect. His behavior is normal. Judgment and thought content normal.   Nursing note and vitals reviewed.              Assessment/Plan:     1. Visit for wound check       Wound appears to be well healing. No signs of worsening infection, vital signs normal. Area was cleaned, Polysporin applied, clean dressing.  Given concern for polypharmacy  as well as chronic conditions no oral therapy was initiated.   Follow-up in 2 days for wound check  Wound care discussed  Return to clinic or go to ED if symptoms worsen or persist. Indications for ED discussed at length. Patient voices understanding. Follow-up with your primary care provider in 3-5 days. Red flags discussed.    Please note that this dictation was created using voice recognition software. I have made every reasonable attempt to correct obvious errors, but I expect that there are errors of grammar and possibly content that I did not discover before finalizing the note.

## 2017-05-26 NOTE — TELEPHONE ENCOUNTER
From: Erica Fisher  To: Evelin Mccray M.D.  Sent: 5/25/2017 1:40 PM PDT  Subject: RE:confusion events    Dr. Mccray. No,my meeting last week was one ending with Dr. Bo telling me ALL was GREAT and he id not plan on doing more as he felf we were on course. HMMMMMMM. You know you are the  and I follow your plans, BUT does seem Dr. Bo would give out such different data does it not? Your feelings on this???  Best to you. Anibal is SOMETHING ISN'T he????  XXOO  ----- Message -----  From: Evelin Mccray M.D.  Sent: 5/25/2017 9:26 AM PDT  To: Erica Fisher  Subject: confusion events    As I believe Dr. Bo told you, the EEG was abnormal and he feels some of these events may be related to seizures. In that case, treatment with seizure medication would help stabilize these events and prevent them from happening. I am communicating with him as to the best way to proceed. I feel that you may not always realize when the events occur as they are actually more obvious to people dealing with you rather than yourself. Hope you are feeling well. I am assisting Anibal in filling out paperwork to get assistance from [facility] to help with the financial costs of your current living situation. Jacob is worried about you and keeps having dreams of you!

## 2017-05-26 NOTE — TELEPHONE ENCOUNTER
From: Erica Fisher  To: Evelin Mccray M.D.  Sent: 5/25/2017 7:55 PM PDT  Subject: RE:neurology appointment    Dr. Wheatley. Well, that is what happens when you feel reasonably well and decisions have to be made. TO MY KNOWLEDGE, Anibal and I are not moving. Any info you can give us for directions would be helpful. Thanks.  Skip.  ----- Message -----  From: Evelin Mccray M.D.  Sent: 5/25/2017 6:43 PM PDT  To: Erica Fisher  Subject: neurology appointment    He had sent the EEG results to me and he stated that if you were still having confusional episodes he thought this would be due to a seizure disorder since the EEG is somewhat abnormal. At your visit he got the impression from you that you had had no further episodes. As you know, you were transported to the ER just a couple weeks ago for a confusional episode and there have been a couple other smaller episodes. I believe you and he had some miscommunication. To me, the EEG information was very helpful as this tells me this is a problem that has a potential solution. I still think some extra care and help for you is also a really good idea. Yes, It is really a privilege working with your son to try to get you some help with the cost of where you are living.

## 2017-05-28 PROBLEM — R41.82 ALTERED MENTAL STATUS: Status: ACTIVE | Noted: 2017-01-01

## 2017-05-28 PROBLEM — R79.89 ELEVATED TROPONIN: Status: ACTIVE | Noted: 2017-01-01

## 2017-05-28 PROBLEM — J18.9 PNEUMONIA: Status: ACTIVE | Noted: 2017-01-01

## 2017-05-28 NOTE — PROGRESS NOTES
Admitted by colleague this morning with AMS . Patient was banging on neighbors door naked looking for his cat. Findings of left basilar infiltrate / Pna. Bacturia - plan IVFs , IV atbs - dw Anibal GRIGSBY Son - [phone]

## 2017-05-28 NOTE — PROGRESS NOTES
The Medication Reconciliation process has been completed by interviewing the patient's son via telephone who did not have a current list of meds and [facility].  The list is up to date but it is unknown when he took his meds last.  The pharmacy stated that he has not filled his Renexa since November and had not been taking it regularly due to the expense.    Allergies have been reviewed  Antibiotic use in 30 days - none per pharmacy    [facility]:  [facility]

## 2017-05-28 NOTE — PROGRESS NOTES
Shift report received and assessment completed. No family at bedside. Pt indicates is confused and illustrating some aggression. Pt believes that he does not need to be in the hospital. Pt very concerned for wallet and cat at home. No c/o of  Pain. Call light placed within reach, bed in lowest position, and bed alarm is set. Will continue to monitor patient.

## 2017-05-28 NOTE — PROGRESS NOTES
Pt's IV indicating infiltration and swelling approximal to infusion site. Stopped remaining a azithromycin and reviewed with pharmacy.

## 2017-05-28 NOTE — ED NOTES
Pt is living in a non assisted facility, pt found wandering around with no pants in place looking for his cat which was in his room, per facility over the past 2-3 weeks pt has increased altered status.  Facility sent pt for evaluation for assisted living.  Pt answering questions appropriately at times but starts to venture off on another topic which has nothing to do with questions asked.  Pt does follow commands at present, NIH is negative .  Edema to lle +1 pitting, per pt hx of gout for a week.

## 2017-05-28 NOTE — H&P
DATE OF ADMISSION:  05/27/2017    PRIMARY CARE PHYSICIAN:  In the past is Dr. Evelin Mccray.    OUTPATIENT NEUROLOGIST:  Jose Levy MD    OUTPATIENT NEPHROLOGIST:  Sin Park MD    CHIEF COMPLAINT:  Mental status changes.    HISTORY OF PRESENT ILLNESS:  This is an 84-year-old.  He lives in an assisted   living facility.  Reportedly, he was found walking around his living facility   without pants on.  He has been having increasing confusion per assisted living   facility.  They felt like he could no longer be cared for.  Patient was   brought into [facility] for further evaluation.  Per emergency room physician,   there were concerns of pneumonia and concern of possible acute delirium.    The patient was seen.  He is absolutely confused, very pleasant individual.    He denies any current issues.  He is in no distress.  He is able to ambulate   with nursing help.  He has no distress.  He denies any recent coughing, no   fevers or chills.    REVIEW OF SYSTEMS:  Unable to obtain secondary to the patient's advanced   dementia/delirium.    ALLERGIES:  COLCHICINE, IBUPROFEN.    CURRENT OUTPATIENT MEDICATIONS:  1.  Allopurinol 100 mg twice a day.  2.  Amlodipine 2.5 mg daily.  3.  Vitamin C 1000 mg daily.  4.  Aspirin 81 mg daily.  5.  Atorvastatin 20 mg daily.  6.  Carvedilol 25 mg 1/2 tab twice a day.  7.  Plavix 75 mg daily.  8.  Iron sulfate 325 daily.  9.  Fluorometholone 0.1% suspension, left eye daily.  10.  Isosorbide mononitrate 60 mg daily.  11.  Synthroid 137 mcg daily.  12.  Meclizine 25 mg every 6 hours as needed for nausea or vomiting.  13.  Nitroglycerin sublingual 0.4 mg as needed for chest pain.  14.  Ranexa 500 mg twice a day.  15.  Tamsulosin 0.4 mg every morning.  16.  Terazosin 2 mg daily.  18.  Vitamin D 50,000 units every 14 days.    PAST MEDICAL HISTORY:  1.  Hypertension.  2.  Benign prostate hypertrophy.  3.  Vitamin D deficiency.  4.  Dementia.  5.  Dyslipidemia.  6.  History of  valvular heart disease.  7.  History of coronary artery disease with myocardial infarction in 2000.  8.  History of abdominal aortic aneurysm/dissection repair type A in the past.  9.  Anemia.  10.  Paroxysmal atrial fibrillation.  11. Chronic kidney disease.  12.  Hypothyroidism.  13.  Dyslipidemia.    PAST SURGICAL HISTORY:  He has had a colonoscopy.  He has had hernia repair,   cholecystectomy, aortic dissection type A repair in the past, neck excision in   2014 of a mass.    SOCIAL HISTORY:  Reportedly a former pipe smoker, quit in 1983.  Previously,   he used to drink wine, none currently.  History of divorce unknown.  He does   have children.  He states he lives in [place].  Reportedly, he was a former   [employer], also _____ [employer].    PHYSICAL EXAMINATION:  VITAL SIGNS:  Temperature is 99, heart rate is 66, respirations 16, blood   pressure is 156/55, oxygen saturations 93% room air.  GENERAL:  This is a robust elderly male.  He is awake, alert and conversant.    Speech is clear.  No distress.  HEENT:  NC/AT, EOMI, PERRL, sclerae are anicteric.  Nares are patent, moist   mucosa.  NECK:  Trachea midline.  No adenopathy.  LUNGS:  Mild crackles at the right base, otherwise clear to auscultation.  No   accessory muscle use, no wheezing noted.  CARDIOVASCULAR:  Regular rate and rhythm.  No murmurs, gallops, or rubs.    Normal S1, S2.  ABDOMEN:  Soft, nontender, nondistended.  Positive bowel sounds.  Mild   obesity.  No peritoneal findings.  EXTREMITIES:  No lower extremity edema.  Calves are symmetric bilaterally.  He   has 2+ radial artery pulses, 2+ dorsalis pedal pulses.  No clubbing or   cyanosis of the digits.  NEUROLOGIC:  Cranial nerves II-XII grossly intact.  PSYCHIATRIC:  He is tangential in thought, unable to really keep on taught of   answering my questions appropriately.  Pleasant individual nonetheless.    LABORATORY DATA:  CBC shows white blood cell count of 7.6, otherwise    unremarkable.  Hemoglobin of 11.1, hematocrit 33.7, platelet count 116.    Comprehensive metabolic panel:  Sodium 137, potassium 4.9, chloride 107, CO2   of 20, BUN of 35, creatinine 2.02, which is actually an improvement over the   last few labs, appears to be close to baseline creatinine function.  Troponin   is 0.06.  Urinalysis:  Trace leukocyte esterase, negative for nitrites, 5-10   wbc's, few bacteria.    IMAGING:  Chest x-ray on May 27th shows left basilar atelectasis or   consolidation per report.  Per my evaluation, chest x-ray shows left   atelectasis versus effusion, prior CABG, atherosclerosis.    ASSESSMENT AND PLAN:  1.  Acute-on-chronic dementia.  I need family's input for what baseline is.  I   watch him carefully and I will have the nurses watch him for wondering.    Treat for infectious etiology.  Check TSH.  Consideration of RPR.  2.  Concern of pneumonia.  Initiate Rocephin and azithromycin.  3.  Elevated troponin.  No complaints of chest pain.  Monitor secondary   troponin trend.  Continue outpatient medications.  4.  Chronic renal failure, appears to be at baseline.  Avoid nephrotoxic   medications.  Follow up with BMP.  5.  Anemia, likely that of chronic kidney disease.  6.  Thrombocytopenia.  No signs of acute bleeding.  Appears to be chronic in   nature looking back in history.       ____________________________________     LANA MACIAS, DO AP[facility] / CAMILO    DD:  05/28/2017 08:49:50  DT:  05/28/2017 09:49:43    D#:  [id number]  Job#:  [id number]

## 2017-05-28 NOTE — ED PROVIDER NOTES
ED Provider Note    CHIEF COMPLAINT  Chief Complaint   Patient presents with   • Mental Status Change       HPI  Erica Fisher is a 84 y.o. male here for evaluation of mental status changes. Patient was allegedly found walking around his assisted living facility, without any pants on. He seems pleasant but confused, and he has no other complaints at this time. He denies a chest pain, shortness of breath, or abdominal pain.  There is no reported fever.      PAST MEDICAL HISTORY   has a past medical history of Dyslipidemia, goal LDL below 100; Hypothyroidism; Bifid sternum; CKD (chronic kidney disease) stage 3, GFR 30-59 ml/min; Angina effort (CMS-HCC); Vitamin d deficiency; Closed Colles' fracture; Paroxysmal atrial fibrillation (CMS-HCC); Anemia; CAD (coronary artery disease); AAA (abdominal aortic aneurysm) (CMS-HCC); Valvular heart disease; Myocardial infarct (CMS-HCC) (2000); Hemorrhagic disorder (CMS-HCC); Hypertension; Electroencephalogram (EEG) abnormality without seizure; Encounter for interrogation of cardiac recorder; Severe aortic insufficiency; Mitral valve regurgitation; and Abnormal electroencephalogram (EEG) (4/26/17).    SOCIAL HISTORY  Social History     Social History Main Topics   • Smoking status: Former Smoker     Types: Pipe     Quit date: 07/06/1983   • Smokeless tobacco: Never Used   • Alcohol Use: 4.2 oz/week     7 Standard drinks or equivalent per week      Comment: 1 glass wine/PM   • Drug Use: No   • Sexual Activity:     Partners: Female       SURGICAL HISTORY   has past surgical history that includes colonoscopy (6/2010); hernia repair; cholecystectomy; other cardiac surgery; neck mass excision (10/31/2014); aaa with stent graft; recovery (6/17/2016); other; other; and recovery (7/1/2016).    CURRENT MEDICATIONS  Home Medications     Reviewed by Clarisse Moreno R.N. (Registered Nurse) on 05/27/17 at 2243  Med List Status: Unable to Obtain    Medication Last Dose Status    allopurinol  "(ZYLOPRIM) 100 MG Tab 5/26/2017 Active    amlodipine (NORVASC) 2.5 MG Tab 5/26/2017 Active    Ascorbic Acid (VITAMIN C) 1000 MG TABS 5/26/2017 Active    aspirin EC (ECOTRIN) 81 MG TBEC 5/26/2017 Active    atorvastatin (LIPITOR) 20 MG Tab 5/26/2017 Active    carvedilol (COREG) 25 MG Tab 5/26/2017 Active    clopidogrel (PLAVIX) 75 MG Tab 5/26/2017 Active    Ferrous Sulfate (IRON) 325 (65 FE) MG TABS 5/26/2017 Active    fluorometholone (FML) 0.1 % Suspension 5/26/2017 Active    isosorbide mononitrate SR (IMDUR) 60 MG TABLET SR 24 HR 5/26/2017 Active    levothyroxine (SYNTHROID) 137 MCG Tab 5/26/2017 Active    meclizine (ANTIVERT) 25 MG Tab 5/26/2017 Active    nitroglycerin (NITROSTAT) 0.4 MG SL Tab 5/26/2017 Active    RANEXA 500 MG TABLET SR 12 HR 5/26/2017 Active    tamsulosin (FLOMAX) 0.4 MG capsule 5/26/2017 Active    terazosin (HYTRIN) 2 MG Cap 5/26/2017 Active    vitamin D, Ergocalciferol, (DRISDOL) 11783 UNITS Cap capsule 5/26/2017 Active                ALLERGIES  Allergies   Allergen Reactions   • Colchicine Diarrhea and Vomiting     Diarrhea, n/v   • Ibuprofen        REVIEW OF SYSTEMS  See HPI for further details. Review of systems as above, otherwise all other systems are negative.     PHYSICAL EXAM  VITAL SIGNS: Pulse 67  Resp 20  Ht 1.803 m (5' 11\")  SpO2 95%    Constitutional: Well appearing patient.  Not toxic, nor ill in appearance.  HEENT: NC/AT.  Extra Ocular Muscles Intact. Posterior pharynx clear, and without exudate.   Neck: Full range of motion; non tender. no meningismus.  Cardiovascular: Regular heart rate and rhythm.  No murmurs, rubs, nor gallop appreciated.   Back:  Non tender midline.  No obvious step off or deformity.  Thorax & Lungs: Lungs are clear to auscultation with good air movement bilaterally.  No wheeze, rhonchi, nor rales.   Abdomen: Soft, with no tenderness, rebound nor guarding.  No mass, pulsatile mass, nor hepatosplenomegaly appreciated.  Skin: No purpura nor petechia noted.  " No rash.  Extremities/Musculoskeletal: No sign of trauma.    Musculoskeletal: Range of motion is intact in all major joints.  Neurologic: Alert & oriented x 1.  CN II-XII grossly intact.   Strength and sensation is intact. Clear speech, appropriate, cooperative.  Psychiatric: Confused    Results for orders placed or performed during the hospital encounter of 05/27/17   CBC WITH DIFFERENTIAL   Result Value Ref Range    WBC 7.6 4.8 - 10.8 K/uL    RBC 3.44 (L) 4.70 - 6.10 M/uL    Hemoglobin 11.1 (L) 14.0 - 18.0 g/dL    Hematocrit 33.7 (L) 42.0 - 52.0 %    MCV 98.0 (H) 81.4 - 97.8 fL    MCH 32.3 27.0 - 33.0 pg    MCHC 32.9 (L) 33.7 - 35.3 g/dL    RDW 53.1 (H) 35.9 - 50.0 fL    Platelet Count 116 (L) 164 - 446 K/uL    MPV 11.9 9.0 - 12.9 fL    Neutrophils-Polys 74.30 (H) 44.00 - 72.00 %    Lymphocytes 13.00 (L) 22.00 - 41.00 %    Monocytes 11.50 0.00 - 13.40 %    Eosinophils 0.40 0.00 - 6.90 %    Basophils 0.40 0.00 - 1.80 %    Immature Granulocytes 0.40 0.00 - 0.90 %    Nucleated RBC 1.40 /100 WBC    Neutrophils (Absolute) 5.68 1.82 - 7.42 K/uL    Lymphs (Absolute) 0.99 (L) 1.00 - 4.80 K/uL    Monos (Absolute) 0.88 (H) 0.00 - 0.85 K/uL    Eos (Absolute) 0.03 0.00 - 0.51 K/uL    Baso (Absolute) 0.03 0.00 - 0.12 K/uL    Immature Granulocytes (abs) 0.03 0.00 - 0.11 K/uL    NRBC (Absolute) 0.11 K/uL   COMP METABOLIC PANEL   Result Value Ref Range    Sodium 137 135 - 145 mmol/L    Potassium 4.9 3.6 - 5.5 mmol/L    Chloride 107 96 - 112 mmol/L    Co2 20 20 - 33 mmol/L    Anion Gap 10.0 0.0 - 11.9    Glucose 111 (H) 65 - 99 mg/dL    Bun 35 (H) 8 - 22 mg/dL    Creatinine 2.02 (H) 0.50 - 1.40 mg/dL    Calcium 9.3 8.5 - 10.5 mg/dL    AST(SGOT) 12 12 - 45 U/L    ALT(SGPT) 8 2 - 50 U/L    Alkaline Phosphatase 71 30 - 99 U/L    Total Bilirubin 1.8 (H) 0.1 - 1.5 mg/dL    Albumin 3.7 3.2 - 4.9 g/dL    Total Protein 6.4 6.0 - 8.2 g/dL    Globulin 2.7 1.9 - 3.5 g/dL    A-G Ratio 1.4 g/dL   TROPONIN   Result Value Ref Range    Troponin I  0.06 (H) 0.00 - 0.04 ng/mL   URINALYSIS CULTURE, IF INDICATED   Result Value Ref Range    Micro Urine Req Microscopic     Color Yellow     Character Hazy (A)     Specific Gravity 1.013 <1.035    Ph 5.5 5.0-8.0    Glucose Negative Negative mg/dL    Ketones Negative Negative mg/dL    Protein 100 (A) Negative mg/dL    Bilirubin Negative Negative    Nitrite Negative Negative    Leukocyte Esterase Trace (A) Negative    Occult Blood Negative Negative    Culture Indicated Yes UA Culture   ESTIMATED GFR   Result Value Ref Range    GFR If  38 (A) >60 mL/min/1.73 m 2    GFR If Non  32 (A) >60 mL/min/1.73 m 2   URINE MICROSCOPIC (W/UA)   Result Value Ref Range    WBC 5-10 (A) /hpf    RBC 2-5 (A) /hpf    Bacteria Few (A) None /hpf    Epithelial Cells Few /hpf    Mucous Threads Few /hpf    Amorphous Crystal Present /hpf    Budding Yeast Present (A) Absent /hpf     CT-HEAD W/O   Final Result      1.  No evidence of acute territorial infarct, intracranial hemorrhage or mass lesion.   2.  Moderate diffuse cerebral and cerebellar substance loss.   3.  Moderate microangiopathic ischemic change versus demyelination or gliosis.      DX-CHEST-PORTABLE (1 VIEW)   Final Result      1.  Left basilar atelectasis and/or consolidation. Underlying infection is possible.   2.   Left basilar atelectasis and/or consolidation. Underlying infection is possible.              PROCEDURES       MEDICAL RECORD  I have reviewed patient's medical record and pertinent results are listed above.    COURSE & MEDICAL DECISION MAKING  I have reviewed any medical record information, laboratory studies and radiographic results as noted above.    1:32 AM  Dr. Clarke will admit the pt.  He is nontoxic appearing, and afebrile, but will be brought in for pneumonia secondary to the cough and finding on x ray, accompanied with his altered mental status.       FINAL IMPRESSION  1. Pneumonia due to infectious organism, unspecified  laterality, unspecified part of lung          Electronically signed by: Reymundo Mcclellan, 5/27/2017 11:22 PM

## 2017-05-28 NOTE — CARE PLAN
Problem: Safety  Goal: Will remain free from injury  Fall risk assessed, fall precautions in place, bed alarm set, pt verbalizes understanding of fall risk.    Problem: Knowledge Deficit  Goal: Knowledge of disease process/condition, treatment plan, diagnostic tests, and medications will improve  Pt and family educated on POC, all questions answered in regards to disease process, treatment and diet. Pt does not verbalize an understanding and voices concerns to issues outside of a medical care.

## 2017-05-28 NOTE — PROGRESS NOTES
Skin Swarm    Bruising noted on left foot near middle toe along with blister in same area.  Bruising noted on lower back.  No other abnormalities noted.

## 2017-05-28 NOTE — ED NOTES
"Pt concerned about his cat at this time.  Explained that he is getting IVABX and he states \"oh yeah I need that\"  Pt staying in room with eyes closed at this time.    "

## 2017-05-28 NOTE — ED NOTES
AB infusing at this time.  Pt continues to be confused but pleasantly cooperative.  Continue to monitor.

## 2017-05-28 NOTE — PROGRESS NOTES
Assumed care of patient at this time. Patient is alert and oriented x2. Patient is in no signs of distress. Patient was updated on the plan of care for the shift. Call light within reach, bed in low position, 2 side rails up. All fall precautions in place. Will continue to monitor.

## 2017-05-29 PROBLEM — N17.9 AKI (ACUTE KIDNEY INJURY) (HCC): Status: ACTIVE | Noted: 2017-01-01

## 2017-05-29 PROBLEM — F03.90 DEMENTIA (HCC): Status: ACTIVE | Noted: 2017-01-01

## 2017-05-29 PROBLEM — D53.9 MACROCYTIC ANEMIA: Status: ACTIVE | Noted: 2017-01-01

## 2017-05-29 PROBLEM — I10 HYPERTENSION: Status: ACTIVE | Noted: 2017-01-01

## 2017-05-29 PROBLEM — N39.0 UTI (URINARY TRACT INFECTION): Status: ACTIVE | Noted: 2017-01-01

## 2017-05-29 NOTE — THERAPY
"Speech Language Therapy Clinical Swallow Evaluation completed.  Functional Status: The patient was seen for clinical swallow evaluation this date. The patient was awake, alert and oriented to self only. The patient was seen during his regular/thin liquid meal tray. The patient was noted to have perseverative chewing during PO intake with concerns for aspiration noted on thin liquid and dry solid trials. Purees, nectars and moist single consistency solids were consumed with no overt s/s of aspiration. At this time, recommend downgrade to Dysphagia II/nectar thick liquids. Please minimize  Distractions (turn T.V. Off) during Po intake. SLP following closely    Recommendations - Diet:  Dysphagia II, Nectar Thick Liquid                          Strategies: Direct supervision during meals and Head of Bed at 90 Degrees                          Medication Administration: Crush all Medications in Puree, Float Whole with Puree  Plan of Care: Will benefit from Speech Therapy 3 times per week  Post-Acute Therapy: Discharge to a transitional care facility for continued skilled therapy services.    See \"Rehab Therapy-Acute\" Patient Summary Report for complete documentation.   "

## 2017-05-29 NOTE — CARE PLAN
Problem: Safety  Goal: Will remain free from falls  Outcome: PROGRESSING AS EXPECTED  Intervention: Implement fall precautions  Treaded slipper socks, bed in lowest position. Bed/chair strip alarms in use d/t risk for falls. Personal belongings within reach, call light within reach and patient calls appropriately.       Problem: Psychosocial Needs:  Goal: Level of anxiety will decrease  Outcome: PROGRESSING AS EXPECTED  Intervention: Identify and develop with patient strategies to cope with anxiety triggers  Pt has history of dementia and was mildly anxious in the morning. Reoriented patient, room is near nursing station. Provided lights and opened curtain to decrease anxiety.

## 2017-05-29 NOTE — ASSESSMENT & PLAN NOTE
-indeterminate range- no chest pain- possibly attrib to acute kidney dysfxn  -Normal EF on echo  -Asa, plavix , statin, bb therapy

## 2017-05-29 NOTE — ASSESSMENT & PLAN NOTE
-Confirmed with family - was to have Fu w PCP for long term care arrangements.   -lives at [facility]

## 2017-05-29 NOTE — DIETARY
Nutrition Services: Wt Loss on Nutrition Admit Screen   84 year old male with admit dx of pneumonia, altered mental status, elevated troponin  Past Pertinent Med Hx: HTN, benign prostate hypertrophy, vitamin D deficiency, dementia, dyslipidemia, valvular heart disease, coronary artery disease with myocardial infarction, abdominal aortic aneurysm/dissection repair type A in the past, anemia, paroxysmal atrial fibrillation, CKD, hypothyroidism, dyslipidemia    Attempted to visit pt, pt was busy with other discipline. Per chart pt PO 0-50%. Per chart review pt's stated wt on 4/29 was 72.576 kg. Wt loss percent from stated wt is 3.8% in a month.     Ht: 180 cm  Wt 5/28: 69.8 kg via bed scale  BMI: 21.54  Diet: Regular, Dysphagia 2, Nectar Thick, 1:1 Supervision  Pertinent Labs: BUN 32, Creatinine 1.71  Pertinent Meds: norvasc, zithromax, coreg, rocephin, plavix, avapro, synthroid, ranexa, pericolace, hytrin  Fluids: NS infusion @ 125 ml/hr   Skin: wound skin tear left distal digit 2;foot  GI: Last BM 5/27    PLAN/RECOMMEND -   1) Nutrition rep to see patient daily for meal and snack preferences.  2) Encourage PO  3) Weekly weights to monitor fluid and nutrition status  4) Obtain supplement order per RD as needed.  5) Please document PO intake for each meal as percentage of meals consumed    RD following'

## 2017-05-29 NOTE — PROGRESS NOTES
Bedside report received, Pt care assumed. Tele box on. VSS, Pt assessment complete. Pt AOX1 to self only, no signs of distress noted at this time.  Pt c/o of 0/10 pain. Pt denies any additional needs at this time. Bed in lowest position, bed alarm is on, ambulates with X1 assistance. POC discussed with Pt/family, verbalizes understanding, no questions at this time. Pt educated on fall risk and verbalizes understanding, call light within reach, will continue to monitor.

## 2017-05-29 NOTE — PROGRESS NOTES
Hospital Medicine Interval Note  Date of Service:  5/29/2017    Chief Complaint  84 y.o.-year-old male  with hx Dementia, Htn , CAD, CKD admitted with  altered mental status . Findings of Left lower lobe infiltrate , Pravin . Bacturia.     Interval Problem Update    Confusion fluctuates - appears to improve toward noon.  Has been on IV atbs for suspected Pna, Uti- afeb- no signif cough or congestion reported. Tele sinus rhythm.     Consultants/Specialty  none    Disposition  Likely Snf if cannot return to prior function .     Review of Systems   Constitutional: Negative for fever and chills.   HENT: Negative for congestion.    Respiratory: Negative for shortness of breath.    Cardiovascular: Negative for chest pain, palpitations and leg swelling.   Gastrointestinal: Negative for vomiting and abdominal pain.   Neurological: Negative for sensory change, speech change, focal weakness and headaches.   Psychiatric/Behavioral: Negative for hallucinations and substance abuse.      Physical Exam Laboratory/Imaging   Filed Vitals:    05/28/17 2052 05/28/17 2346 05/29/17 0500 05/29/17 0800   BP: 156/70 154/66 154/67 179/77   Pulse: 70 64 71 73   Temp: 36.6 °C (97.8 °F) 36.7 °C (98 °F) 36.4 °C (97.6 °F) 36.7 °C (98.1 °F)   Resp: 18 18 18 18   Height:       Weight: 69.8 kg (153 lb 14.1 oz)      SpO2: 94% 94% 93% 94%     Physical Exam   Constitutional: No distress.   Making jokes   Eyes: EOM are normal. No scleral icterus.   Neck: Neck supple.   Cardiovascular: Normal rate and regular rhythm.    No murmur heard.  Pulmonary/Chest: No stridor. He has no wheezes. He has no rales.   Slight dim bs bases.    Abdominal: Soft. Bowel sounds are normal. He exhibits no distension. There is no tenderness.   Neurological: He is alert.   o x 2, mod confused.    Skin: Skin is warm and dry. He is not diaphoretic.   Psychiatric: He has a normal mood and affect. His behavior is normal.    Lab Results   Component Value Date/Time    WBC 6.2 05/29/2017  02:29 AM    HEMOGLOBIN 9.8* 05/29/2017 02:29 AM    HEMATOCRIT 30.2* 05/29/2017 02:29 AM    PLATELET COUNT 110* 05/29/2017 02:29 AM     Lab Results   Component Value Date/Time    SODIUM 140 05/29/2017 02:29 AM    POTASSIUM 4.6 05/29/2017 02:29 AM    CHLORIDE 112 05/29/2017 02:29 AM    CO2 20 05/29/2017 02:29 AM    GLUCOSE 91 05/29/2017 02:29 AM    BUN 32* 05/29/2017 02:29 AM    CREATININE 1.71* 05/29/2017 02:29 AM      Assessment/Plan    * Altered mental status  Assessment & Plan  w Encephalopathy - metabolic, infectious - Delirium - improved  Correct renal fxn, IV atbs  Avoid narcotics, benzos     Pneumonia  Assessment & Plan  w left lower lobe infiltrate. Stable resp symptoms   IV atbs    UTI (urinary tract infection)  Assessment & Plan  adequ emp atb coverage  Fu cx results.     Elevated troponin  Assessment & Plan  indet range- no chest pain- possibly attrib to acute kidney dysfxn  Fu echo eval lv / wall motion  Asa, plavix , statin, bb therapy   Tele monitoring.     BLOSSOM (acute kidney injury) (CMS-HCC)  Assessment & Plan  Possible dehydrated  IVFs- fu renal fxn    Macrocytic anemia  Assessment & Plan  No symptoms of bleed, recent thyroid fxn ok.  Check b12 level. iron, occult blood studies.     Hypertension  Assessment & Plan  Controlled  cw outpt bp rx.    Dementia  Assessment & Plan  Confirmed with family - was to have Fu w PCP for long term care arrangements.     Stented coronary artery (present on admission)  Assessment & Plan  Medical management.      Dw son Anibal GRIGSBY plan of care. Will have SS assist w dc planning.  Labs reviewed, Medications reviewed and Radiology images reviewed  Arrington catheter: No Arrington      DVT Prophylaxis: Heparin      Antibiotics: Treating active infection/contamination beyond 24 hours perioperative coverage

## 2017-05-30 PROBLEM — E87.6 HYPOKALEMIA: Status: ACTIVE | Noted: 2017-01-01

## 2017-05-30 NOTE — THERAPY
"Physical Therapy Evaluation completed.   Bed Mobility:  Supine to Sit: Minimal Assist  Transfers: Sit to Stand: Contact Guard Assist  Gait: Level Of Assist: Contact Guard Assist with Front-Wheel Walker       Plan of Care: Will benefit from Physical Therapy 2 times per week  Discharge Recommendations: Equipment: Will Continue to Assess for Equipment Needs. Post-acute therapy Discharge to a transitional care facility for continued skilled therapy services (or return to [facility] with 24hr spv).       See \"Rehab Therapy-Acute\" Patient Summary Report for complete documentation.     "

## 2017-05-30 NOTE — PROGRESS NOTES
Monitor Summary:    SR 60-87    Occasional PVC's  Frequent PAC's  First degree heart block    .24/.10/.44

## 2017-05-30 NOTE — PROGRESS NOTES
Pt agitated and aggressive towards staff and neighboring patient/wife. Security called because patient got out of bed and wouldn't get back in. Restraints ordered with 4 bed rails up, and patient is now in a new room.

## 2017-05-30 NOTE — CARE PLAN
Problem: Urinary Elimination:  Goal: Ability to reestablish a normal urinary elimination pattern will improve  Has condom catheter in place.    Problem: Skin Integrity  Goal: Risk for impaired skin integrity will decrease  Skin assessed at beginning of shift. Q2turn, patient kept clean and dry. Check skin c1vinwm.

## 2017-05-30 NOTE — PROGRESS NOTES
Received report and assumed care of patient. Patient is alert and oriented to self and year. Patient slightly agitated, medications were recently given for agitation. Patient is in bilateral soft wrist restraints with x4 siderails up. Patient was updated on the plan of care for the day. Call light within reach, bed in low position, 2 side rails up. All fall precautions in place. Will continue to monitor.

## 2017-05-30 NOTE — DISCHARGE PLANNING
Medical Social Work    Update: DPOA for medical decision making is son, Anibal Fisher. [phone]

## 2017-05-30 NOTE — CARE PLAN
Problem: Safety  Goal: Will remain free from injury  Intervention: Provide assistance with mobility  Call light within reach, treaded socks in place, bed in lowest position and locked.  Hourly rounding in progress.       Problem: Respiratory:  Goal: Respiratory status will improve  Intervention: Assess and monitor pulmonary status  No s/sx of respiratory distress.

## 2017-05-30 NOTE — PROGRESS NOTES
Assumed pt care at 0715.  Received report from night RN.  Assessment completed.  Pt oriented to self.  Pt has no comlaints of pain at this time.  No other s/s of discomfort or distress.  Bed in lowest position, locked, and bed alarm is on.  IV abx infusing. Treaded socks in place, call light within reach and staff numbers provided.  Pt needs met at this time.

## 2017-05-31 NOTE — DISCHARGE PLANNING
Medical Social Work    Update: Per [facility], they are not able to take pt back at current level of care. Pt is living in independent side of the [facility]. PT/OT recommending SNF. Updated hospitalist RN.

## 2017-05-31 NOTE — PROGRESS NOTES
"Son Anibal called back to hospital and states his father has many different measures on his POLST and would like him to remain full code at this time and expresses \"Only DNR if his father in in a coma\". Also reports that [facility] will take him back as soon as medically clear.    "

## 2017-05-31 NOTE — DISCHARGE PLANNING
Care Transition Team Assessment    Met with pt at bedside. Per OT/PT, pt most likely will discharge to SNF. Pt doesn't drive, states he takes cab for transportation.    Information Source  Orientation : Oriented x 4  Information Given By: Patient  Informant's Name: Erica Fisher  Who is responsible for making decisions for patient? : Patient    Readmission Evaluation  Is this a readmission?: Yes - unplanned readmission  Why do you think you were readmitted?: Pneumonia  Was an appointment arranged for you prior to discharge?: Yes, attended appointment  Were there new prescriptions you were supposed to fill after you were discharged?: Yes, prescriptions filled  Did you understand your discharge instructions?: Yes  Did you have enough support after your last discharge?: Yes    Elopement Risk  Legal Hold: No  Ambulatory or Self Mobile in Wheelchair: Yes  Disoriented: Place-At Risk for Elopement, Situation-At Risk for Elopement  Elopement Risk: Not at Risk for Elopement  [place]uard On: Unavailable  Personal Belongings: Hospital Clothing Only    Interdisciplinary Discharge Planning  Primary Care Physician: Dr. Ramos   Lives with - Patient's Self Care Capacity: Unable To Determine At This Time  Housing / Facility: Assisted Living Residence  Name of Care Facility: last [facility]   Do You Take your Prescribed Medications Regularly: Yes  Mobility Issues: Yes  Prior Services: Unable To Determine At This Time  Patient Expects to be Discharged to:: SNF   Assistance Needed: Unknown at this Time  Durable Medical Equipment: Not Applicable    Discharge Preparedness  What is your plan after discharge?: Skilled nursing facility  What are your discharge supports?: Child  Prior Functional Level: Ambulatory, Independent with Activities of Daily Living, Independent with Medication Management  Difficulity with ADLs: None  Difficulity with IADLs: Driving  Difficulity with IADL Comments: Pt doesn't drive    Functional Assesment  Prior  Functional Level: Ambulatory, Independent with Activities of Daily Living, Independent with Medication Management    Finances  Financial Barriers to Discharge: No  Prescription Coverage: Yes (Pharmacy: [facility])    Vision / Hearing Impairment  Vision Impairment : Yes  Right Eye Vision: Impaired, Wears Glasses  Left Eye Vision: Impaired, Wears Glasses  Hearing Impairment : No         Advance Directive  Advance Directive?: DPOA for Health Care  Durable Power of  Name and Contact : Anibal Fisher, #[phone]    Domestic Abuse  Have you ever been the victim of abuse or violence?: No  Physical Abuse or Sexual Abuse: No  Verbal Abuse or Emotional Abuse: No    Psychological Assessment  History of Substance Abuse: None  History of Psychiatric Problems: No  Non-compliant with Treatment: No  Newly Diagnosed Illness: No    Discharge Risks or Barriers  Discharge risks or barriers?: No    Anticipated Discharge Information  Anticipated discharge disposition: SNF  Discharge Address: N/A  Discharge Contact Phone Number: [phone]

## 2017-05-31 NOTE — THERAPY
"Speech Language Therapy dysphagia treatment completed.   Functional Status:  Pt with variable behavior this AM. Irritability, confabulatory, and suspicious with SLP. Pt with less irritability with CNA. CNA into take b/p with elevated b/p this AM. Pt offered bfast tray before and after SLP warmed the food on his tray. Pt taking one sip of OJ (nectar thick) and declined his tray. Pt preferring applesauce with intermittent talking while puree in his mouth. Pt with decreased ability to be redirected to breakfast meal. No coughing and pt with clear voice. Pt triggering 1-2 swallows per tsp of applesauce. Thin liquids not targeted related to pt not at the level this AM to respond to cueing. Cont D2 NTL as tolerated. Per EMR, plan is to d/c back to pt's assistive living. Pt will benefit from HH to promote safe advancement to higher level texture if medically appropriate.   Recommendations: D2 NTL with superv and as tolerated.  Plan of Care: Will benefit from Speech Therapy 3 times per week  Post-Acute Therapy: Discharge to home with outpatient or home health for additional skilled therapy services. Thanks, Marky    See \"Rehab Therapy-Acute\" Patient Summary Report for complete documentation.     "

## 2017-05-31 NOTE — PROGRESS NOTES
Report received at the bed side. No family at bedside. No complains of pain or SOB. Call light and belongings within reach. Bed alarm in place. Bed in lowest position.

## 2017-05-31 NOTE — PROGRESS NOTES
[facility] Hospitalist Progress Note    Date of Service: 2017    Chief Complaint  84 year-old male  with hx Dementia, Htn , CAD, CKD admitted with  altered mental status . Findings of Left lower lobe infiltrate , Pravin . Bacturia.     Interval Problem Update  Sitting in chair today, trying to get back to [facility]  More communicative, but rather unpleasant    Consultants/Specialty  None    Disposition  Likely SNF if cannot return to prior function; trying to get [facility] to take him back    FC, verified by son, says he would only want DNR if in coma  D/W tx team on rounds      Review of Systems   Constitutional: Negative for fever and chills.   Respiratory: Negative for cough and shortness of breath.    Cardiovascular: Negative for chest pain and palpitations.   Gastrointestinal: Negative for nausea, vomiting and abdominal pain.   Genitourinary: Negative for dysuria.   Musculoskeletal: Negative for myalgias.   Neurological: Negative for headaches.      Physical Exam  Laboratory/Imaging   Hemodynamics  Temp (24hrs), Av.7 °C (98 °F), Min:36.1 °C (96.9 °F), Max:36.9 °C (98.4 °F)   Temperature: 36.8 °C (98.2 °F)  Pulse  Av.7  Min: 57  Max: 106    Blood Pressure : 124/64 mmHg      Respiratory      Respiration: 16, Pulse Oximetry: 96 %     Work Of Breathing / Effort: Mild  RUL Breath Sounds: Clear, RML Breath Sounds: Clear, RLL Breath Sounds: Diminished, ERIC Breath Sounds: Clear, LLL Breath Sounds: Diminished    Fluids    Intake/Output Summary (Last 24 hours) at 17 0921  Last data filed at 17 0700   Gross per 24 hour   Intake   3000 ml   Output   1250 ml   Net   1750 ml       Nutrition  Orders Placed This Encounter   Procedures   • Diet Order     Standing Status: Standing      Number of Occurrences: 1      Standing Expiration Date:      Order Specific Question:  Diet:     Answer:  Regular [1]     Order Specific Question:  Texture/Fiber modifications:     Answer:  Dysphagia 2(Pureed/Chopped)specify fluid  consistency(question 6) [2]     Order Specific Question:  Consistency/Fluid modifications:     Answer:  Nectar Thick [2]     Order Specific Question:  Miscellaneous modifications:     Answer:  SLP - 1:1 Supervision by Nursing [21]     Physical Exam   Cardiovascular: Normal rate, regular rhythm and normal heart sounds.    No murmur heard.  Pulmonary/Chest: Effort normal and breath sounds normal. No respiratory distress. He has no wheezes. He has no rales.   Abdominal: Soft. Bowel sounds are normal. He exhibits no distension.   Musculoskeletal: He exhibits no edema.   Neurological: He is alert.   More oriented   Skin: Skin is warm and dry. No erythema.   Nursing note and vitals reviewed.      Recent Labs      05/29/17 0229 05/30/17 0221   WBC  6.2  5.6   RBC  3.06*  3.05*   HEMOGLOBIN  9.8*  9.8*   HEMATOCRIT  30.2*  29.8*   MCV  98.7*  97.7   MCH  32.0  32.1   MCHC  32.5*  32.9*   RDW  53.7*  51.5*   PLATELETCT  110*  112*   MPV  12.1  12.3     Recent Labs      05/29/17 0229 05/30/17 0221 05/31/17 0219   SODIUM  140  140  138   POTASSIUM  4.6  4.2  4.2   CHLORIDE  112  113*  112   CO2  20  18*  18*   GLUCOSE  91  93  89   BUN  32*  27*  25*   CREATININE  1.71*  1.57*  1.67*   CALCIUM  9.0  8.9  9.0             Recent Labs      05/29/17 0229   TRIGLYCERIDE  77   HDL  37*   LDL  64          Assessment/Plan     * Altered mental status (present on admission)  Assessment & Plan  -Encephalopathy - metabolic, infectious - Delirium - improved  -appropriate for assisted living      Community acquired pneumonia (present on admission)  Assessment & Plan  -with left lower lobe infiltrate, resolving  -Continue abx, RT prtoocol    UTI (urinary tract infection) (present on admission)  Assessment & Plan  -ceftriaxone, cx negative    Elevated troponin (present on admission)  Assessment & Plan  -indeterminate range- no chest pain- possibly attrib to acute kidney dysfxn  -Normal EF on echo  -Asa, plavix , statin, bb  therapy       BLOSSOM (acute kidney injury) (CMS-HCC) (present on admission)  Assessment & Plan  -Possible dehydrated; stabilized with fluids      Macrocytic anemia (present on admission)  Assessment & Plan  -No symptoms of bleed, recent thyroid fxn ok.  -Seems chronic from how iron looks    Hypertension (present on admission)  Assessment & Plan  -Controlled on norvasc, coreg, avapro, terazosin.    Dementia (present on admission)  Assessment & Plan  -Confirmed with family - was to have Fu w PCP for long term care arrangements.   -lives at [facility]    Hypokalemia  Assessment & Plan  -replaced    Stented coronary artery (present on admission)  Assessment & Plan  Medical management.      Labs reviewed, Medications reviewed and Radiology images reviewed  Arrington catheter: No Arrington      DVT Prophylaxis: Heparin      Antibiotics: Treating active infection/contamination beyond 24 hours perioperative coverage

## 2017-05-31 NOTE — PROGRESS NOTES
Hospital Medicine Interval Note    Date of Service: 2017    Chief Complaint  84 year-old male  with hx Dementia, Htn , CAD, CKD admitted with  altered mental status . Findings of Left lower lobe infiltrate , Pravin . Bacturia.     Interval Problem Update  Sleeping today, arousable but not communicative     Consultants/Specialty  None    Disposition  Likely Snf if cannot return to prior function; will look into [facility] taking him back    FC, trying to reach family  D/W tx team on rounds        Review of Systems   Unable to perform ROS  Sleeping   Physical Exam  Laboratory/Imaging   Hemodynamics  Temp (24hrs), Av.4 °C (97.5 °F), Min:36.1 °C (96.9 °F), Max:36.9 °C (98.4 °F)   Temperature: 36.7 °C (98.1 °F)  Pulse  Av.2  Min: 57  Max: 106    Blood Pressure : 122/61 mmHg      Respiratory      Respiration: 18, Pulse Oximetry: 95 %     Work Of Breathing / Effort: Mild  RUL Breath Sounds: Clear, RML Breath Sounds: Clear, RLL Breath Sounds: Diminished, ERIC Breath Sounds: Clear, LLL Breath Sounds: Diminished    Fluids  Date 17 0700 - 17 0659   Shift 2616-5970 8344-6323 4826-5441 24 Hour Total   I  N  T  A  K  E   I.V. 1500 1500  3000    Shift Total 1500 1500  3000   O  U  T  P  U  T   Shift Total       Weight (kg) 70.9 70.9 70.9 70.9         Nutrition  Orders Placed This Encounter   Procedures   • Diet Order     Standing Status: Standing      Number of Occurrences: 1      Standing Expiration Date:      Order Specific Question:  Diet:     Answer:  Regular [1]     Order Specific Question:  Texture/Fiber modifications:     Answer:  Dysphagia 2(Pureed/Chopped)specify fluid consistency(question 6) [2]     Order Specific Question:  Consistency/Fluid modifications:     Answer:  Nectar Thick [2]     Order Specific Question:  Miscellaneous modifications:     Answer:  SLP - 1:1 Supervision by Nursing [21]     Physical Exam   Cardiovascular: Normal rate, regular rhythm and normal heart sounds.    No murmur  heard.  Pulmonary/Chest: Effort normal and breath sounds normal. No respiratory distress. He has no wheezes. He has no rales.   Abdominal: Soft. Bowel sounds are normal. He exhibits no distension.   Musculoskeletal: He exhibits no edema.   Neurological:   Sleeping, difficult to arouse, but possible   Skin: Skin is warm and dry. No erythema.   Nursing note and vitals reviewed.      Recent Labs      05/27/17 2309 05/29/17 0229 05/30/17 0221   WBC  7.6  6.2  5.6   RBC  3.44*  3.06*  3.05*   HEMOGLOBIN  11.1*  9.8*  9.8*   HEMATOCRIT  33.7*  30.2*  29.8*   MCV  98.0*  98.7*  97.7   MCH  32.3  32.0  32.1   MCHC  32.9*  32.5*  32.9*   RDW  53.1*  53.7*  51.5*   PLATELETCT  116*  110*  112*   MPV  11.9  12.1  12.3     Recent Labs      05/27/17 2309 05/29/17 0229 05/30/17 0221   SODIUM  137  140  140   POTASSIUM  4.9  4.6  4.2   CHLORIDE  107  112  113*   CO2  20  20  18*   GLUCOSE  111*  91  93   BUN  35*  32*  27*   CREATININE  2.02*  1.71*  1.57*   CALCIUM  9.3  9.0  8.9             Recent Labs      05/29/17 0229   TRIGLYCERIDE  77   HDL  37*   LDL  64          Assessment/Plan     * Altered mental status (present on admission)  Assessment & Plan  Encephalopathy - metabolic, infectious - Delirium - improving slowly  Avoid narcotics, benzos     Pneumonia (present on admission)  Assessment & Plan  w left lower lobe infiltrate. Stable resp symptoms   Continue abx, RT prtoocol    UTI (urinary tract infection) (present on admission)  Assessment & Plan  -ceftriaxone, cx negative    Elevated troponin (present on admission)  Assessment & Plan  indeterminate range- no chest pain- possibly attrib to acute kidney dysfxn  Normal EF on echo  Asa, plavix , statin, bb therapy   Tele monitoring.     BLOSSOM (acute kidney injury) (CMS-HCC) (present on admission)  Assessment & Plan  Possible dehydrated; improving with fluids      Macrocytic anemia (present on admission)  Assessment & Plan  No symptoms of bleed, recent thyroid  fxn ok.  Seems chronic from how iron looks    Hypertension (present on admission)  Assessment & Plan  Controlled on norvasc, coreg, avapro, terazosin.    Dementia (present on admission)  Assessment & Plan  Confirmed with family - was to have Fu w PCP for long term care arrangements.     Hypokalemia  Assessment & Plan  -replacing    Stented coronary artery (present on admission)  Assessment & Plan  Medical management.      Labs reviewed, Medications reviewed and Radiology images reviewed  Arrington catheter: No Arrington      DVT Prophylaxis: Heparin      Antibiotics: Treating active infection/contamination beyond 24 hours perioperative coverage

## 2017-05-31 NOTE — CARE PLAN
Problem: Safety  Goal: Will remain free from injury  Bed alarm on, non slip socks on, bed in low position, 2 side rails up, call light within reach, will continue to monitor.    Problem: Mobility  Goal: Risk for activity intolerance will decrease  Patient up and ambulating in benjamin ways x1 assist with walker.

## 2017-05-31 NOTE — THERAPY
"Occupational Therapy Evaluation completed.   Functional Status:  Pt pleasant, confused & at times has random speech.  Pt was Min A for supine to sit EOB.  Pt transferred into bedside chair with CGA for lunch.  Pt able to self feed with set up.  Pt did require extra time to eat his lunch & needed a few V/Q's for redirection to his lunch.    Plan of Care: Will benefit from Occupational Therapy 2 times per week  Discharge Recommendations:  Equipment: Will Continue to Assess for Equipment Needs. Post-acute therapy Discharge to a transitional care facility for continued skilled therapy services.    See \"Rehab Therapy-Acute\" Patient Summary Report for complete documentation.    "

## 2017-05-31 NOTE — PROGRESS NOTES
Received report and assumed care of patient. Patient is alert and oriented to self. Patient is in no signs of distress, denies pain at this time. Patient sitting up eating dinner, doing well. Patient up and ambulated halls with this RN with walker, tolerated well. Patient was updated on the plan of care for the day. Call light within reach, bed in low position, 2 side rails up. All fall precautions in place. Will continue to monitor.

## 2017-06-01 PROBLEM — R41.82 ALTERED MENTAL STATUS: Status: RESOLVED | Noted: 2017-01-01 | Resolved: 2017-01-01

## 2017-06-01 PROBLEM — E87.6 HYPOKALEMIA: Status: RESOLVED | Noted: 2017-01-01 | Resolved: 2017-01-01

## 2017-06-01 PROBLEM — R79.89 ELEVATED TROPONIN: Status: RESOLVED | Noted: 2017-01-01 | Resolved: 2017-01-01

## 2017-06-01 PROBLEM — N39.0 UTI (URINARY TRACT INFECTION): Status: RESOLVED | Noted: 2017-01-01 | Resolved: 2017-01-01

## 2017-06-01 PROBLEM — J18.9 COMMUNITY ACQUIRED PNEUMONIA: Status: RESOLVED | Noted: 2017-01-01 | Resolved: 2017-01-01

## 2017-06-01 NOTE — DISCHARGE PLANNING
CCS received a SNF choice form. Per the choice form the referral has been sent to [facility]. [facility]

## 2017-06-01 NOTE — PROGRESS NOTES
Report received at the bed side. No family at bedside. Pt sleeping, no signs of distress or SOB. Call light and belongings within reach. Bed alarm in place. Bed in lowest position.

## 2017-06-01 NOTE — CARE PLAN
Problem: Safety  Goal: Will remain free from injury  Pt mobility assessed at the beginning of the shift, pt is 1 person assist. Fall precautions in place, non-slip socks on, bed in lowest, locked position, and call light is within reach. Pt educated to call for assistance, and verbalizes understanding.

## 2017-06-01 NOTE — DISCHARGE INSTRUCTIONS
Discharge Instructions    Discharged to {DISCHARGE TO:416601} by medical transportation with self. Discharged via wheelchair, hospital escort: Yes.  Special equipment needed: Walker    Be sure to schedule a follow-up appointment with your primary care doctor or any specialists as instructed.     Discharge Plan:   Influenza Vaccine Indication: Not indicated: Previously immunized this influenza season and > 8 years of age    I understand that a diet low in cholesterol, fat, and sodium is recommended for good health. Unless I have been given specific instructions below for another diet, I accept this instruction as my diet prescription.   Other diet: ***    Special Instructions: {BODY SYSTEMS:50379}    · Is patient discharged on Warfarin / Coumadin?   {WARFARIN YES/NO?:644012}    · Is patient Post Blood Transfusion?  {TRANSFUSION YES/NO?:44503}    Depression / Suicide Risk    As you are discharged from this [facility] facility, it is important to learn how to keep safe from harming yourself.    Recognize the warning signs:  · Abrupt changes in personality, positive or negative- including increase in energy   · Giving away possessions  · Change in eating patterns- significant weight changes-  positive or negative  · Change in sleeping patterns- unable to sleep or sleeping all the time   · Unwillingness or inability to communicate  · Depression  · Unusual sadness, discouragement and loneliness  · Talk of wanting to die  · Neglect of personal appearance   · Rebelliousness- reckless behavior  · Withdrawal from people/activities they love  · Confusion- inability to concentrate     If you or a loved one observes any of these behaviors or has concerns about self-harm, here's what you can do:  · Talk about it- your feelings and reasons for harming yourself  · Remove any means that you might use to hurt yourself (examples: pills, rope, extension cords, firearm)  · Get professional help from the community (Mental Health,  Substance Abuse, psychological counseling)  · Do not be alone:Call your Safe Contact- someone whom you trust who will be there for you.  · Call your local CRISIS HOTLINE 167-5118 or 502-133-0329  · Call your local Children's Mobile Crisis Response Team [place] (437) 634-8236 or www.[employer]  · Call the toll free National Suicide Prevention Hotlines   · National Suicide Prevention Lifeline 361-615-RRVK (9071)  · National Hope Line Network 800-SUICIDE (780-9493)

## 2017-06-01 NOTE — DISCHARGE PLANNING
Transportation has been arranged to transfer to [facility] at 1730 via the [facility]. SW on floor Elba has been notified via phone.

## 2017-06-01 NOTE — DISCHARGE PLANNING
CCS received a call from Dilma at [facility] the referral has been accepted. SW on floor Elba has been notified.

## 2017-06-01 NOTE — CARE PLAN
Problem: Knowledge Deficit  Goal: Knowledge of disease process/condition, treatment plan, diagnostic tests, and medications will improve  Educated pt on disease process, treatment plan, and reason for medications he is on. Pt educated how to deep breath and cough.

## 2017-06-01 NOTE — PROGRESS NOTES
Pt resting in chair, no apparent signs of distress, no complains of pain or SOB, chair alarm on, call light in place, bed in lowest locked position.

## 2017-06-01 NOTE — CARE PLAN
Problem: Nutritional:  Goal: Achieve adequate nutritional intake  Patient will consume ~50% of meals   Outcome: PROGRESSING SLOWER THAN EXPECTED  -added high protein smoothie, per pt request = 411 kcal, 28 grams protein/day

## 2017-06-01 NOTE — DISCHARGE SUMMARY
CHIEF COMPLAINT ON ADMISSION  Chief Complaint   Patient presents with   • Mental Status Change       CODE STATUS  Full Code    HPI & HOSPITAL COURSE  For H and P on admission, please refer to full H and P dictated by Dr. Clarke on 5/28/17 for full details.  Briefly, this is an 84-year-old male who  lives at [facility] assisted living facility.  Reportedly, he was found walking around his living facility without pants on.  He had been having increasing confusion per assisted living facility.  They felt like he could no longer be cared for.  He was brought into [facility] for further evaluation.  Per emergency room physician, there were concerns of pneumonia and concern of possible acute delirium.    He was treated for both pneumonia and urinary tract infection and improved very nicely.  However, he does need some skilled level of care for PT/OT.    Therefore, he is discharged in good and stable condition for further post-acute management.     SPECIFIC OUTPATIENT FOLLOW-UP  None    DISCHARGE PROBLEM LIST  Principal Problem (Resolved):    Altered mental status POA: Yes  Active Problems:    BLOSSOM (acute kidney injury) (CMS-[facility]) POA: Yes    Macrocytic anemia POA: Yes    Hypertension POA: Yes    Stented coronary artery POA: Yes      Overview: 2007- 2.5 x 18 bare metal stent in unspecified artery      11/2010 - 2.75 x 15 mm Xience stent in unspecified artery    Dementia POA: Yes  Resolved Problems:    Community acquired pneumonia POA: Yes    UTI (urinary tract infection) POA: Yes    Elevated troponin POA: Yes    Hypokalemia POA: No      FOLLOW UP  Future Appointments  Date Time Provider Department Center   6/15/2017 1:20 PM Evelin Mccray M.D. [facility]   7/25/2017 10:00 AM Sin Park M.D. [facility] [address]   9/1/2017 10:00 AM Sin Park M.D. [facility] [address]     No follow-up provider specified.    MEDICATIONS ON DISCHARGE  Medication Information                      acetaminophen (TYLENOL) 325 MG Tab  Take 2 Tabs by mouth  every 6 hours as needed (Mild Pain; (Pain scale 1-3); Temp greater than 100.5 F).             allopurinol (ZYLOPRIM) 100 MG Tab  Take 2 Tabs by mouth every day.             amlodipine (NORVASC) 5 MG Tab  Take 1 Tab by mouth every day.             aspirin EC (ECOTRIN) 81 MG TBEC  Take 81 mg by mouth every day.             atorvastatin (LIPITOR) 20 MG Tab  Take 1 Tab by mouth every day.             carvedilol (COREG) 25 MG Tab  Take 0.5 Tabs by mouth 2 times a day, with meals.             clopidogrel (PLAVIX) 75 MG Tab  TAKE ONE TABLET BY MOUTH EVERY DAY             Ferrous Sulfate (IRON) 325 (65 FE) MG TABS  Take 1 Tab by mouth every Monday, Wednesday, and Friday.             heparin 5000 UNIT/ML Solution  Inject 1 mL as instructed every 8 hours.             irbesartan (AVAPRO) 150 MG Tab  Take 150 mg by mouth every day.             levothyroxine (SYNTHROID) 137 MCG Tab  Take 1 Tab by mouth Every morning on an empty stomach.             meclizine (ANTIVERT) 25 MG Tab  Take 1 Tab by mouth every 6 hours as needed for Dizziness, Nausea/Vomiting or Vertigo.             nitroglycerin (NITROSTAT) 0.4 MG SL Tab  Place 1 Tab under tongue as needed for Chest Pain.             RANEXA 500 MG TABLET SR 12 HR  Take 1 Tab by mouth 2 times a day.             senna-docusate (PERICOLACE OR SENOKOT S) 8.6-50 MG Tab  Take 2 Tabs by mouth 2 Times a Day.             tamsulosin (FLOMAX) 0.4 MG capsule  TAKE  ONE CAPSULE BY MOUTH EVERY MORNING 30 MINUTES AFTER BREAKFAST             terazosin (HYTRIN) 2 MG Cap  TAKE 1 CAPSULE BY MOUTH EVERY DAY             vitamin D, Ergocalciferol, (DRISDOL) 66518 UNITS Cap capsule  Take 1 Cap by mouth every 14 days.                 DIET  Orders Placed This Encounter   Procedures   • Diet Order     Standing Status: Standing      Number of Occurrences: 1      Standing Expiration Date:      Order Specific Question:  Diet:     Answer:  Regular [1]     Order Specific Question:  Texture/Fiber modifications:      Answer:  Dysphagia 2(Pureed/Chopped)specify fluid consistency(question 6) [2]     Order Specific Question:  Consistency/Fluid modifications:     Answer:  Nectar Thick [2]     Order Specific Question:  Miscellaneous modifications:     Answer:  SLP - 1:1 Supervision by Nursing [21]       ACTIVITY  As tolerated and directed by skilled nursing.  Class 2 - comfortable at rest but have symptoms with ordinary physcial activity.    LINES, DRAINS, AND WOUNDS  This is an automated list. Peripheral IVs will be removed prior to discharge.  PIV Group Left;Upper Upper Arm 20g Flexible Catheter (Active)   Line Secured Taped;Transparent 6/1/2017  8:00 AM   Site Condition / Description Assessed;Patent;Clean;Dry;Intact 6/1/2017  8:00 AM   Dressing Type / Description Transparent;Clean;Dry;Intact 6/1/2017  8:00 AM   Dressing Status Observed 6/1/2017  8:00 AM   Infiltration Grading Used by [facility] and [facility] 0 6/1/2017  8:00 AM   Phlebitis Scale (Used by [facility]) 0 6/1/2017  8:00 AM   Date Primary Tubing Changed 05/30/17 5/31/2017  8:00 PM   Date Secondary Tubing Changed 05/30/17 5/30/2017  7:54 AM   NEXT Primary Tubing Change  06/03/17 5/31/2017  8:00 PM   NEXT Secondary Tubing Change  05/31/17 5/30/2017  7:54 AM       Wound Skin Tear Digit 2 ;Foot Left Distal (Active)   Wound Bed Black 6/1/2017  8:00 AM   Drainage  None 6/1/2017  8:00 AM   Periwound Skin Discolored 6/1/2017  8:00 AM   Cleansing Not Applicable 6/1/2017  8:00 AM   Dressing Options Open to Air 6/1/2017  8:00 AM   Dressing Status / Change Not Applicable 6/1/2017  8:00 AM   Dressing Cleansing/Solutions Not Applicable 6/1/2017  8:00 AM   Periwound Protectant Skin Moisturizer 6/1/2017  8:00 AM   Length (cm) 2 5/28/2017  8:00 AM   Width (cm) 2 5/28/2017  8:00 AM   Depth (cm) 0 5/28/2017  8:00 AM                  MENTAL STATUS ON TRANSFER  Level of Consciousness: Confused  Orientation : Disoriented to Event, Disoriented to Place  Speech: Speech  Clear    CONSULTATIONS  None    PROCEDURES  None    LABORATORY  Lab Results   Component Value Date/Time    SODIUM 140 06/01/2017 02:29 AM    POTASSIUM 4.2 06/01/2017 02:29 AM    CHLORIDE 112 06/01/2017 02:29 AM    CO2 20 06/01/2017 02:29 AM    GLUCOSE 88 06/01/2017 02:29 AM    BUN 21 06/01/2017 02:29 AM    CREATININE 1.67* 06/01/2017 02:29 AM        Lab Results   Component Value Date/Time    WBC 5.6 05/30/2017 02:21 AM    HEMOGLOBIN 9.8* 05/30/2017 02:21 AM    HEMATOCRIT 29.8* 05/30/2017 02:21 AM    PLATELET COUNT 112* 05/30/2017 02:21 AM    UCx negative    Total time of the discharge process exceeds 38 minutes.

## 2017-06-01 NOTE — DISCHARGE PLANNING
Medical Social Work    Update:   Pt will d/c to [facility] at 5:30pm. Bedside RN updated.

## 2017-06-01 NOTE — TELEPHONE ENCOUNTER
A Skip's request I have been communicating with his son Anibal Fisher (has his POA).  He is trying to get his VA paperwork completed to assist with his care. I have been following along in the chart and note that he will be being transferred to skilled nursing to recover. I have explained to Anibal that he is still not ambulating well needing a walker and assistance and is a high fall risk. He is also anemic and iron deficient which is not well explained. He has significant heart disease and is recovering from pneumonia and is not yet safe to go home.  More VA paperwork sent today.

## 2017-06-01 NOTE — CARE PLAN
Problem: Safety  Goal: Will remain free from injury  Bed alarm on, non slip socks on, bed in low position, 2 side rails up, call light within reach, will continue to monitor.    Problem: Mobility  Goal: Risk for activity intolerance will decrease  Patient ambulating in halls and room with x1 assist, tolerating well.

## 2017-06-01 NOTE — PROGRESS NOTES
Received report and assumed care of patient. Patient is alert and oriented to self and time. Patient is in no signs of distress, denies pain at this time. Patient more agitated tonight and trying to get out of bed. Haldol per MAR given. Patient was updated on the plan of care for the day. Call light within reach, bed in low position, 2 side rails up. All fall precautions in place. Will continue to monitor.

## 2017-06-01 NOTE — DISCHARGE PLANNING
[facility] received a Transport form to arrange transportation to transfer the patient to [facility]. [facility] called and spoke to Dilma Estrada at [facility]. [facility] is waiting from a call back from Dilma Estrada.

## 2017-06-01 NOTE — THERAPY
"Physical Therapy Treatment completed.   Bed Mobility:  Supine to Sit: Minimal Assist  Transfers: Sit to Stand: Contact Guard Assist  Gait: Level Of Assist: Contact Guard Assist with Front-Wheel Walker       Plan of Care: Will benefit from Physical Therapy 2 times per week  Discharge Recommendations: Equipment: Will Continue to Assess for Equipment Needs. Post-acute therapy Discharge to a transitional care facility for continued skilled therapy services.     See \"Rehab Therapy-Acute\" Patient Summary Report for complete documentation.     Pt presents with continued decreased functional mobility with adductor spasticity making it difficult for him to maintain an adequate base of support. He has difficulty transferring sidelying to supine and confuses the sequence of transfers. He would benefit from continued skilled physical therapy while in-house and would benefit from post-acute skilled rehab before returning home.   "

## 2017-06-02 NOTE — PROGRESS NOTES
Pt off the floor w/[facility]. Belongings with the pt - shoes and wallet. Paperwork w/ medication list given to transport person.

## 2017-07-10 PROBLEM — N17.9 AKI (ACUTE KIDNEY INJURY) (HCC): Status: RESOLVED | Noted: 2017-01-01 | Resolved: 2017-01-01

## 2017-07-10 NOTE — TELEPHONE ENCOUNTER
From: Erica Fisher  To: Evelin Mccray M.D.  Sent: 7/10/2017 7:53 AM PDT  Subject: RE:glad to hear from you    Good morn Dr. Wheatley. I have begun the practice of building up phlem in my throat while sleeping, then arise and it shortly goes away. More nuisance than anything more. Just tough it out ???  Never did hear back if you want to run some blood, etc.   YOU am the best.  XOXOXO  ----- Message -----  From: Evelin Mccray M.D.  Sent: 7/6/2017 3:58 PM PDT  To: Erica Fisher  Subject: glad to hear from you    Glad you are back online. 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 Jacob competed yesterday. Did better than he expected, in spite of his hip. Happy to hear from you. Many people asked after you. We told them about your pneumonia. Carlos Menard and others sent regards.
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
## 2017-07-18 NOTE — TELEPHONE ENCOUNTER
"From: Erica Fisher  To: Evelin Mccray M.D.  Sent: 7/18/2017 9:45 AM PDT  Subject: RE:glad you and will be getting your labs done soon. Please pace your self.    Shall. Am going in this Friday to give blood. Is this a blood and urine or just blood? To say the say the least am VERY anxious. Figure the sample should be done by middle of next week. Have started working out with a really [email or web address] (?)_ once a week for one hour. OK? Am tired MOST of the afternoons. Just no energy Ok in the a m but run down in pm. In beds by around 9 p m. Axious to talk to you about ' the fine thin line.\" Am there yet?  See you soon.  Skip  ----- Message -----  From: Evelin Mccray M.D.  Sent: 7/14/2017 8:38 AM PDT  To: Erica Fisher  Subject: glad you and will be getting your labs done soon. Please pace your self.    Please pace yourself. It takes several months to get over pneumonia.  "
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
## 2017-07-25 NOTE — MR AVS SNAPSHOT
"Ercia Fisher   2017 10:00 AM   Office Visit   MRN: [id number]    Department:  [facility]   Dept Phone:  [phone]    Description:  Male : 3/17/1933   Provider:  Sin Park M.D.           Reason for Visit     Follow-Up           Allergies as of 2017     Allergen Noted Reactions    Colchicine 2014   Diarrhea, Vomiting    Diarrhea, n/v    Ibuprofen 2011         You were diagnosed with     Chronic kidney disease, stage IV (severe) (CMS-[facility])   [585.4.ICD-9-CM]       Essential hypertension   [0603563]         Vital Signs     Blood Pressure Pulse Temperature Respirations Height Weight    152/60 mmHg 53 36.5 °C (97.7 °F) 20 1.803 m (5' 10.98\") 74.39 kg (164 lb)    Body Mass Index Oxygen Saturation Smoking Status             22.88 kg/m2 97% Former Smoker         Basic Information     Date Of Birth Sex Race Ethnicity Preferred Language    3/17/1933 Male White Non- English      Your appointments     2017 10:00 AM   Established Patient with Sin Park M.D.   [facility] ([address])    [address] [place] [address] [address]  [place] [address] [phone]           You will be receiving a confirmation call a few days before your appointment from our automated call confirmation system.            Oct 24, 2017  2:45 PM   Follow Up Visit with Sin Park M.D.   [facility] ([address] [place] [address])    [address] [place] [address] [address]  [place] [id number] [phone]           You will be receiving a confirmation call a few days before your appointment from our automated call confirmation system.              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 Low  Unknown - Present    Essential hypertension I10 High  Unknown - Present    Gouty arthritis M10.9 Low  11/20/2012 - Present    Angina effort (CMS-HCC) I20.8 High  12/4/2012 - Present    Aortic regurgitation (Chronic) I35.1 Medium  Unknown - Present    Mitral regurgitation (Chronic) I34.0 Medium  Unknown - Present    Paroxysmal atrial fibrillation (CMS-HCC) (Chronic) I48.0 Medium  Unknown - Present    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 Medium  2/15/2013 - Present    Vitamin D deficiency disease E55.9 Low  Unknown - Present    Stented coronary artery Z95.5 Low  4/7/2014 - Present    Chronic gout M1A.9XX0 Low  2/3/2015 - Present    Pulmonary HTN (CMS-HCC) I27.2 Low  5/11/2015 - Present    MEDICAL HOME  Low  5/13/2015 - Present    Pernicious anemia D51.0 Low  6/22/2015 - Present    Vitamin B 12 deficiency E53.8 Low  4/29/2016 - Present    Bradycardia R00.1 High  5/5/2016 - Present    Atherosclerosis of native coronary artery of native heart with angina pectoris (CMS-HCC) I25.119   7/15/2016 - Present    Chronic kidney disease, stage IV (severe) (CMS-HCC) N18.4   11/11/2016 - Present    Secondary hyperparathyroidism of renal origin (CMS-HCC) N25.81   11/11/2016 - Present    Benign prostatic hyperplasia with lower urinary tract symptoms N40.1   11/11/2016 - Present    Electroencephalogram (EEG) abnormality without seizure R94.01   Unknown - Present    Severe aortic insufficiency I35.1   Unknown - Present    Mitral valve regurgitation I34.0   Unknown - Present    Abnormal electroencephalogram (EEG) R94.01   Unknown - Present    Dementia F03.90   5/29/2017 - Present    Macrocytic anemia D53.9 Medium  5/29/2017 - Present    Hypertension I10 Medium  5/29/2017 - Present      Health Maintenance        Date Due Completion Dates    IMM PNEUMOCOCCAL 65+ (ADULT) HIGH/HIGHEST RISK  SERIES (2 of 2 - PPSV23) 2/13/2017 12/19/2016    IMM DTaP/Tdap/Td Vaccine (1 - Tdap) 12/1/2017 (Originally 3/17/1952) ---    IMM ZOSTER VACCINE 12/1/2017 (Originally 3/17/1993) ---    IMM INFLUENZA (1) 9/1/2017 12/19/2016, 9/23/2015, 10/2/2014, 11/1/2013, 11/16/2012, 10/22/2011    COLONOSCOPY 6/6/2020 6/6/2010 (Prv Comp)    Override on 6/6/2010: Previously completed (in [place], normal)            Current Immunizations     13-VALENT PCV PREVNAR 12/19/2016    Influenza LAIV (Nasal) 11/16/2012, 10/22/2011    Influenza TIV (IM) 10/2/2014, 11/1/2013    Influenza Vaccine Adult HD 12/19/2016, 9/23/2015    Pneumococcal Vaccine (UF)Historical Data 3/1/2010      Below and/or attached are the medications your provider expects you to take. Review all of your home medications and newly ordered medications with your provider and/or pharmacist. Follow medication instructions as directed by your provider and/or pharmacist. Please keep your medication list with you and share with your provider. Update the information when medications are discontinued, doses are changed, or new medications (including over-the-counter products) are added; and carry medication information at all times in the event of emergency situations     Allergies:  COLCHICINE - Diarrhea,Vomiting     IBUPROFEN - (reactions not documented)               Medications  Valid as of: July 25, 2017 -  9:46 AM    Generic Name Brand Name Tablet Size Instructions for use    Acetaminophen (Tab) TYLENOL 325 MG Take 2 Tabs by mouth every 6 hours as needed (Mild Pain; (Pain scale 1-3); Temp greater than 100.5 F).        Allopurinol (Tab) ZYLOPRIM 100 MG Take 2 Tabs by mouth every day.        AmLODIPine Besylate (Tab) NORVASC 5 MG Take 1 Tab by mouth every day.        Aspirin (Tablet Delayed Response) ECOTRIN 81 MG Take 81 mg by mouth every day.        Atorvastatin Calcium (Tab) LIPITOR 20 MG Take 1 Tab by mouth every day.        Carvedilol (Tab) COREG 25 MG Take 0.5 Tabs by  mouth 2 times a day, with meals.        Clopidogrel Bisulfate (Tab) PLAVIX 75 MG TAKE ONE TABLET BY MOUTH EVERY DAY        Cyanocobalamin (Tab) VITAMIN B12 1000 MCG Take 1 Tab by mouth every day.        Docusate Sodium (Cap) COLACE 100 MG Take 1 Cap by mouth every day.        Ergocalciferol (Cap) DRISDOL 95191 UNITS Take 1 Cap by mouth every 14 days.        Ferrous Sulfate (Tab) Iron 325 (65 FE) MG Take 1 Tab by mouth every Monday, Wednesday, and Friday.        Heparin Sodium (Porcine) (Solution) heparin 5000 UNIT/ML Inject 1 mL as instructed every 8 hours.        Irbesartan (Tab) AVAPRO 150 MG Take 150 mg by mouth every day.        Levothyroxine Sodium (Tab) SYNTHROID 137 MCG Take 1 Tab by mouth Every morning on an empty stomach.        Losartan Potassium (Tab) COZAAR 50 MG Take 1 Tab by mouth every day.        Meclizine HCl (Tab) ANTIVERT 25 MG Take 1 Tab by mouth every 6 hours as needed for Dizziness, Nausea/Vomiting or Vertigo.        Nitroglycerin (SL Tab) NITROSTAT 0.4 MG Place 1 Tab under tongue as needed for Chest Pain.        Ranolazine (TABLET SR 12 HR) RANEXA 500 MG Take 1 Tab by mouth 2 times a day.        Sennosides-Docusate Sodium (Tab) PERICOLACE or SENOKOT S 8.6-50 MG Take 2 Tabs by mouth 2 Times a Day.        Tamsulosin HCl (Cap) FLOMAX 0.4 MG TAKE  ONE CAPSULE BY MOUTH EVERY MORNING 30 MINUTES AFTER BREAKFAST        Terazosin HCl (Cap) HYTRIN 2 MG TAKE 1 CAPSULE BY MOUTH EVERY DAY        .                 Medicines prescribed today were sent to:     [id number] - [place], [place] - [address]    [address] [facility] [place] [address]    Phone: [phone] Fax: [phone]    Open 24 Hours?: No    [facility] - [place] - [address] [facility].    [address] [place] [address]    Phone: [phone] Fax: [phone]    Open 24 Hours?: No      Medication refill instructions:       If your prescription bottle indicates you have medication refills left, it is not necessary to call your  provider’s office. Please contact your pharmacy and they will refill your medication.    If your prescription bottle indicates you do not have any refills left, you may request refills at any time through one of the following ways: The online [facility] system (except Urgent Care), by calling your provider’s office, or by asking your pharmacy to contact your provider’s office with a refill request. Medication refills are processed only during regular business hours and may not be available until the next business day. Your provider may request additional information or to have a follow-up visit with you prior to refilling your medication.   *Please Note: Medication refills are assigned a new Rx number when refilled electronically. Your pharmacy may indicate that no refills were authorized even though a new prescription for the same medication is available at the pharmacy. Please request the medicine by name with the pharmacy before contacting your provider for a refill.        Your To Do List     Future Labs/Procedures Complete By Expires    BASIC METABOLIC PANEL  As directed 1/22/2018    CBC WITHOUT DIFFERENTIAL  As directed 1/22/2018    MICROALBUMIN CREAT RATIO URINE  As directed 1/24/2018    PTH INTACT (PTH ONLY)  As directed 7/26/2018    VITAMIN D,25 HYDROXY  As directed 1/25/2018      Other Notes About Your Plan     Patient is enrolled in [facility] with Dr. Mccray    Pt goes Skip           [facility] Access Code: Activation code not generated  Current [facility] Status: Active

## 2017-07-25 NOTE — PROGRESS NOTES
"Subjective:      Erica Fisher is a 84 y.o. male who presents with Follow-Up            HPI  84 year old with a history of HTN, CAD, aortic dissection who has CKD stage IV  He is feeling a bit tired and tells me he will be slowing down.      1. CKD stage III - Cr 2.00, at his baseline during the summer months, usually peaks for the year at this time. Feeling tired.  2. HTN - SBP 150s    Review of Systems   Constitutional: Negative for fever and chills.   Cardiovascular: Negative for chest pain and palpitations.          Objective:     /60 mmHg  Pulse 53  Temp(Src) 36.5 °C (97.7 °F)  Resp 20  Ht 1.803 m (5' 10.98\")  Wt 74.39 kg (164 lb)  BMI 22.88 kg/m2  SpO2 97%     Physical Exam   Constitutional: He is oriented to person, place, and time. He appears well-developed and well-nourished.   Cardiovascular: Normal rate and regular rhythm.    Pulmonary/Chest: Effort normal and breath sounds normal.   Neurological: He is alert and oriented to person, place, and time.   Skin: Skin is warm and dry.   Psychiatric: He has a normal mood and affect. His behavior is normal.               Assessment/Plan:     1. Chronic kidney disease, stage IV (severe) (CMS-[facility])  Patient with CKD, stable for him. Will follow with CKD labs. Basically at baseline, as he always peaks in terms of Cr over the summer months.     - BASIC METABOLIC PANEL; Future  - CBC WITHOUT DIFFERENTIAL; Future  - PTH INTACT (PTH ONLY); Future  - VITAMIN D,25 HYDROXY; Future  - MICROALBUMIN CREAT RATIO URINE; Future    2. Essential hypertension  BP is a bit higher than usual. Taking his medications.          "

## 2017-07-31 NOTE — TELEPHONE ENCOUNTER
From: Erica Fisher  To: Evelin Mccray M.D.  Sent: 7/28/2017 7:08 PM PDT  Subject: RE:glad to hear that you are feeling well     had hoped I would have heard about the test last week. Are week we on schedule? Prognosses?   Where do we go from this? Please to keep me up to date.  Best. Skip What is the length of the term plan for the sick stuff  ?  ----- Message -----  From: Evelin Mccray M.D.  Sent: 7/21/2017 12:43 PM PDT  To: Erica Fisher  Subject: glad to hear that you are feeling well    Glad you are feeling well and hope he can get a good program together for you.

## 2017-07-31 NOTE — TELEPHONE ENCOUNTER
From: Erica Fisher  To: Evelin Mccray M.D.  Sent: 7/31/2017 4:32 PM PDT  Subject: RE:thyroid problem    I poke with and the thyrid got taken taken care of. Seem to be better. Still a little slow but cannot be well overnigfht. Miss sen watson.  XOXO  ----- Message -----  From: Evelin Mccray M.D.  Sent: 7/31/2017 7:50 AM PDT  To: Erica Fisher  Subject: thyroid problem    I don't know if you missed the results that I sent you on July 21 or not. You did not reply to my questions so I believe you did not see it. Please let me know if you are taking your thyroid medication regularly. If you are we need to adjust the dose. I went ahead and sent a prescription again to your pharmacy on that day and sent a note asking the people at [facility] I to be sure you were getting of the thyroid medication and taking it daily. I am glad to see that Dr. Park feels your kidney problems are stable. Please double check that you are taking the thyroid medication every day.

## 2017-08-02 NOTE — TELEPHONE ENCOUNTER
Was the patient seen in the last year in this department? Yes     Does patient have an active prescription for medications requested? Yes     Received Request Via: [facility] on behalf of patient

## 2017-08-09 NOTE — TELEPHONE ENCOUNTER
Shireen with [facility] called back stating pt is in a full on code but the son and paramedic discussed not to take pt to ED, son wants to wait. Per Shireen the patient is been monitor by another member in the building if he continues they will call 911.

## 2017-08-09 NOTE — TELEPHONE ENCOUNTER
"1. Caller Name: Shireen with [facility]                                          Call Back Number: [phone]      Patient approves a detailed voicemail message: yes    2. What are the patient's symptoms (location & severity)? Slurred speech, chest pain for couple of wk's increasing, hard time remembering, irritable per Shireen symptoms look like \"TIA\"    3. Is this a new symptom Yes    4. When did it start? Chest pain wk's other symptoms today    5. Action taken per Active Symptom Guide: Emergency Department recommended    6. Patient agrees to recommended action per active symptom guide    "

## 2017-08-10 NOTE — TELEPHONE ENCOUNTER
From: Erica Fisher  To: Evelin Mccray M.D.  Sent: 8/9/2017 7:02 PM PDT  Subject: RE:glad you are better    . Well seems all hell is falling in on me now. Curious why Anibal would go against you and Dr. Basilio. ?????? NEED HELP TO UNDERSTAND. UNDERSTAND.        Skip  ----- Message -----  From: Evelin Mccray M.D.  Sent: 7/31/2017 4:52 PM PDT  To: Erica Fisher  Subject: glad you are better    Very glad you are better. Would like you to get a thyroid test again in six weeks, around mid September I will send an order to the lab.

## 2017-08-14 NOTE — TELEPHONE ENCOUNTER
From: Erica Fisher  To: Evelin Mccray M.D.  Sent: 8/12/2017 11:14 AM PDT  Subject: RE:are you feeling better?    Dr. Wheatley, I do appreciate his feelings, but have yet to have ANYONE except cause me to veer from our current path. Would like to come and talk with you if I may. CLEMENTo    S  ----- Message -----  From: Evelin Mccray M.D.  Sent: 8/10/2017 8:04 AM PDT  To: Erica Fisher  Subject: are you feeling better?    Not sure either. I think he feels the hospital might do more harm than good. He was very concerned last time because you became so disoriented in that setting. Please keep well hydrated.

## 2017-08-14 NOTE — TELEPHONE ENCOUNTER
From: Erica Fisher  To: Evelin Mccray M.D.  Sent: 8/14/2017 8:00 AM PDT  Subject: RE:are you feeling better?    Dr. Wheatley. Have now lost ten pounds in last l weeks. Am 150 naked. NOT GOD. Food just just does not not and NO DESIRE. NO WANT TO DO. Please answer. to so some measure.  SS  ----- Message -----  From: Evelin Mccray M.D.  Sent: 8/10/2017 8:04 AM PDT  To: Erica Fisher  Subject: are you feeling better?    Not sure either. I think he feels the hospital might do more harm than good. He was very concerned last time because you became so disoriented in that setting. Please keep well hydrated.

## 2017-08-22 PROBLEM — I10 HYPERTENSION: Status: RESOLVED | Noted: 2017-01-01 | Resolved: 2017-01-01

## 2017-08-22 NOTE — PROGRESS NOTES
CC: hypothyroidism, anemia/b12 deficiency, pneumonia vaccine, hypertension, memory issues, dysarthria, angina    HPI:   Erica presents today with the following.    1. Idiopathic atrophic hypothyroidism  Where he lives his medications have now all been mixed into a single morning dose. He was being given the thyroid medication with food and other medications. On discussion with [facility] they cannot give it separately. Have increased the dose and he states he is feeling somewhat better.    2. Pernicious anemia/vitamin B-12 deficiency  He is due for a B-12 injection, needs to be administered here today    3. Need for pneumococcal vaccination  He is due    4. Essential hypertension  Patient reports he has assistance with his medications at [facility]. He believes he is taking all his current medications.  The medications are carvedilol 12.5 twice a day, amlodipine 5 mg daily, irbesartan 150 mg daily and terazosin 2 mg. Reports the facility monitors Blood pressures monthly with results of 120s over 80s. Good control. Patient denies cough, edema, fatigue or shortness of breath from the medications. Denies symptoms of chest pain or pressure, shortness of breath, orthopnea or lower extremity edema. Pertinent information as follows:  Exercise 5-6 days per week.  Alcohol consumption zero drinks per week.      5. Memory problem  He is having episodes of confusion.  He is trying to be more organized.  He is story telling today and is tangential.    6. Dysarthria  He is having dysarthria and word finding difficulty.    7. Angina effort (CMS-HCC)  Cannot afford the ranexa.   States that he currently is not having chest pain or pressure or palpitations with exercise. He is exercising daily.    8. Chronic kidney disease (CKD), stage III (moderate)  He remains stable. Please see recent evaluation with his nephrologist, Dr. Park.    9. Carotid artery dissection (CMS-HCC)  This is a very old and long standing problem. He has seen  vascular surgery in the past who has advised no intervention. Needs to follow up with cardiology.  His mental status is worse than when last seen.  Continue current regimen.    10. Pulmonary HTN (CMS-HCC)  This has been stable.  Echocardiogram was stable.  Needs to follow up with cardiology.      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 (CMS-HCC) 07/01/2007     Priority: High   • Macrocytic anemia 05/29/2017     Priority: Medium   • Carotid artery dissection (CMS-HCC) 02/15/2013     Priority: Medium   • Aortic regurgitation      Priority: Medium   • Mitral regurgitation      Priority: Medium   • Paroxysmal atrial fibrillation (CMS-HCC)      Priority: Medium   • Dyslipidemia, goal LDL below 100      Priority: Medium   • Hx of repair of dissecting thoracic aortic aneurysm, Dudley type A 02/23/2007     Priority: Medium   • Vitamin B 12 deficiency 04/29/2016     Priority: Low   • Pernicious anemia 06/22/2015     Priority: Low   • MEDICAL HOME 05/13/2015     Priority: Low   • Pulmonary HTN (CMS-HCC) 05/11/2015     Priority: Low   • Chronic gout 02/03/2015     Priority: Low   • Stented coronary artery 04/07/2014     Priority: Low   • Vitamin D deficiency disease      Priority: Low   • Gouty arthritis 11/20/2012     Priority: Low   • Idiopathic atrophic hypothyroidism      Priority: Low   • Dementia 05/29/2017   • Severe aortic insufficiency    • Mitral valve regurgitation    • Abnormal electroencephalogram (EEG)    • Electroencephalogram (EEG) abnormality without seizure    • Chronic kidney disease (CKD), stage III (moderate) 11/11/2016   • Secondary hyperparathyroidism of renal origin (CMS-HCC) 11/11/2016   • Benign prostatic hyperplasia with lower urinary tract  symptoms 11/11/2016   • Atherosclerosis of native coronary artery of native heart with angina pectoris (CMS-HCC) 07/15/2016       Current Outpatient Prescriptions   Medication Sig Dispense Refill   • carvedilol (COREG) 12.5 MG Tab Take 12.5 mg by mouth 2 Times a Day.     • levothyroxine (SYNTHROID) 150 MCG Tab Take 1 Tab by mouth Every morning on an empty stomach. 90 Tab 3   • atorvastatin (LIPITOR) 20 MG Tab Take 1 Tab by mouth every day. 90 Tab 0   • amlodipine (NORVASC) 5 MG Tab Take 1 Tab by mouth every day. 30 Tab 6   • irbesartan (AVAPRO) 150 MG Tab Take 1 Tab by mouth every day. 30 Tab 6   • cyanocobalamin (VITAMIN B12) 1000 MCG Tab Take 1 Tab by mouth every day. 30 Tab 11   • RANEXA 500 MG TABLET SR 12 HR Take 1 Tab by mouth 2 times a day. 56 Tab 0   • docusate sodium (COLACE) 100 MG Cap Take 1 Cap by mouth every day. 30 Cap 6   • senna-docusate (PERICOLACE OR SENOKOT S) 8.6-50 MG Tab Take 2 Tabs by mouth 2 Times a Day. 30 Tab 0   • heparin 5000 UNIT/ML Solution Inject 1 mL as instructed every 8 hours.  0   • acetaminophen (TYLENOL) 325 MG Tab Take 2 Tabs by mouth every 6 hours as needed (Mild Pain; (Pain scale 1-3); Temp greater than 100.5 F). 30 Tab 0   • meclizine (ANTIVERT) 25 MG Tab Take 1 Tab by mouth every 6 hours as needed for Dizziness, Nausea/Vomiting or Vertigo. 20 Tab 0   • allopurinol (ZYLOPRIM) 100 MG Tab Take 2 Tabs by mouth every day. 180 Tab 3   • vitamin D, Ergocalciferol, (DRISDOL) 43587 UNITS Cap capsule Take 1 Cap by mouth every 14 days. 6 Cap 0   • tamsulosin (FLOMAX) 0.4 MG capsule TAKE  ONE CAPSULE BY MOUTH EVERY MORNING 30 MINUTES AFTER BREAKFAST 90 Cap 3   • clopidogrel (PLAVIX) 75 MG Tab TAKE ONE TABLET BY MOUTH EVERY DAY 90 Tab 3   • nitroglycerin (NITROSTAT) 0.4 MG SL Tab Place 1 Tab under tongue as needed for Chest Pain. 25 Tab 6   • terazosin (HYTRIN) 2 MG Cap TAKE 1 CAPSULE BY MOUTH EVERY DAY 90 Cap 3   • aspirin EC (ECOTRIN) 81 MG TBEC Take 81 mg by mouth every day.     •  "Ferrous Sulfate (IRON) 325 (65 FE) MG TABS Take 1 Tab by mouth every Monday, Wednesday, and Friday.       Current Facility-Administered Medications   Medication Dose Route Frequency Provider Last Rate Last Dose   • cyanocobalamin (VITAMIN B-12) injection 1,000 mcg  1,000 mcg Intramuscular Once Evelin Mccray M.D.             Allergies as of 08/22/2017 - Alberto as Reviewed 08/22/2017   Allergen Reaction Noted   • Colchicine Diarrhea and Vomiting 08/01/2014   • Ibuprofen  01/13/2011        ROS: As per HPI.    /50 mmHg  Pulse 60  Temp(Src) 36.7 °C (98.1 °F)  Resp 16  Ht 1.803 m (5' 10.98\")  Wt 66.225 kg (146 lb)  BMI 20.37 kg/m2  SpO2 94%    Physical Exam:  Gen:         Alert and oriented, No apparent distress.  Fluent but somewhat tangential.      Neck:       No JVD No Lymphadenopathy or Bruits.  Lungs:     Clear to auscultation bilaterally, good air movement  CV:          Regular rate and rhythm. 2-3/6 systolic murmur, no rubs or gallops.               Ext:          No clubbing, cyanosis, no peripheral edema.      Assessment and Plan.   84 y.o. male with the following issues.    1. Idiopathic atrophic hypothyroidism  Continue daily thyroid medication.    2. Pernicious anemia  Injected today  - cyanocobalamin (VITAMIN B-12) injection 1,000 mcg; 1 mL by Intramuscular route Once.    3. Vitamin B 12 deficiency  Administered today  - cyanocobalamin (VITAMIN B-12) injection 1,000 mcg; 1 mL by Intramuscular route Once.    4. Need for pneumococcal vaccination  Administered today  - PNEUMOCOCCAL POLYSACCHARIDE VACCINE 23-VALENT =>3YO SQ/IM    5. Essential hypertension  He is currently very well controlled, perhaps slightly over controlled. He has lost 18 pounds since last month. He states he is now gaining weight again and is eating better. Unclear whether he is receiving 10 mg amlodipine or 5 mg. Will verify with his nurse at [facility].    6. Memory problem  This appears to be quite a bit worse than previously. " However, nowhere near as bad as it was when he was hospitalized and then in the nursing home with pneumonia.    7. Dysarthria  He is having some word finding difficulty and is occasionally dysarthric today.    8. Angina effort (CMS-[facility])  Clinically this seems to be stable at this time. Will stay off her Nexium for now as it is very difficult for the patient to afford.    9. Chronic kidney disease (CKD), stage III (moderate)  This is now stable. It has been quite stable for a long time. He continues follow-up with nephrology on a regular basis.    10. Carotid artery dissection (CMS-[facility])  Discussed with the patient but I have advised seeing cardiologist again. He does not need to see the vascular surgeon as she did state that no intervention was needed.    11. Pulmonary HTN (CMS-[facility])  He does have pulmonary hypertension and I believe he may need follow-up imaging or evaluation.

## 2017-08-22 NOTE — MR AVS SNAPSHOT
"        Erica Fisher   2017 11:00 AM   Office Visit   MRN: [id number]    Department:  [facility]   Dept Phone:  [phone]    Description:  Male : 3/17/1933   Provider:  Evelin Mccray M.D.           Reason for Visit     Hypothyroidism     Anemia           Allergies as of 2017     Allergen Noted Reactions    Colchicine 2014   Diarrhea, Vomiting    Diarrhea, n/v    Ibuprofen 2011         You were diagnosed with     Idiopathic atrophic hypothyroidism   [676714]       Pernicious anemia   [281.0.ICD-9-CM]       Vitamin B 12 deficiency   [376211]       Need for pneumococcal vaccination   [017858]       Essential hypertension   [2743329]       Memory problem   [304082]       Dysarthria   [784.51.ICD-9-CM]       Angina effort (CMS-[facility])   [308014]       Chronic kidney disease (CKD), stage III (moderate)   [721608]       Carotid artery dissection (CMS-[facility])   [292520]       Pulmonary HTN (CMS-[facility])   [460899]         Vital Signs     Blood Pressure Pulse Temperature Respirations Height Weight    110/50 mmHg 60 36.7 °C (98.1 °F) 16 1.803 m (5' 10.98\") 66.225 kg (146 lb)    Body Mass Index Oxygen Saturation Smoking Status             20.37 kg/m2 94% Former Smoker         Basic Information     Date Of Birth Sex Race Ethnicity Preferred Language    3/17/1933 Male White Non- English      Your appointments     Oct 24, 2017  2:45 PM   Follow Up Visit with Sin Park M.D.   [facility] ([facility] [address])    [address] [place] [address], [address]  [facility] [address]   [phone]           You will be receiving a confirmation call a few days before your appointment from our automated call confirmation system.              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 (CMS-[facility]) I25.111 " High  7/1/2007 - Present    Hx of repair of dissecting thoracic aortic aneurysm, Dudley type A Z98.890, Z86.79 Medium  2/23/2007 - Present    Idiopathic atrophic hypothyroidism E03.4 Low  Unknown - Present    Essential hypertension I10 High  Unknown - Present    Gouty arthritis M10.9 Low  11/20/2012 - Present    Angina effort (CMS-HCC) I20.8 High  12/4/2012 - Present    Aortic regurgitation (Chronic) I35.1 Medium  Unknown - Present    Mitral regurgitation (Chronic) I34.0 Medium  Unknown - Present    Paroxysmal atrial fibrillation (CMS-HCC) (Chronic) I48.0 Medium  Unknown - Present    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 Medium  2/15/2013 - Present    Vitamin D deficiency disease E55.9 Low  Unknown - Present    Stented coronary artery Z95.5 Low  4/7/2014 - Present    Chronic gout M1A.9XX0 Low  2/3/2015 - Present    Pulmonary HTN (CMS-HCC) I27.2 Low  5/11/2015 - Present    MEDICAL HOME  Low  5/13/2015 - Present    Pernicious anemia D51.0 Low  6/22/2015 - Present    Vitamin B 12 deficiency E53.8 Low  4/29/2016 - Present    Bradycardia R00.1 High  5/5/2016 - Present    Atherosclerosis of native coronary artery of native heart with angina pectoris (CMS-HCC) I25.119   7/15/2016 - Present    Chronic kidney disease (CKD), stage III (moderate) N18.3   11/11/2016 - Present    Secondary hyperparathyroidism of renal origin (CMS-HCC) N25.81   11/11/2016 - Present    Benign prostatic hyperplasia with lower urinary tract symptoms N40.1   11/11/2016 - Present    Electroencephalogram (EEG) abnormality without seizure R94.01   Unknown - Present    Severe aortic insufficiency I35.1   Unknown - Present    Mitral valve regurgitation I34.0   Unknown - Present    Abnormal electroencephalogram (EEG) R94.01   Unknown - Present    Dementia F03.90   5/29/2017 - Present    Macrocytic anemia D53.9 Medium  5/29/2017 - Present      Health Maintenance        Date Due Completion Dates    IMM INFLUENZA (1) 9/1/2017 12/19/2016, 9/23/2015,  10/2/2014, 11/1/2013, 11/16/2012, 10/22/2011    IMM DTaP/Tdap/Td Vaccine (1 - Tdap) 12/1/2017 (Originally 3/17/1952) ---    IMM ZOSTER VACCINE 12/1/2017 (Originally 3/17/1993) ---    COLONOSCOPY 6/6/2020 6/6/2010 (Prv Comp)    Override on 6/6/2010: Previously completed (in [place], normal)            Current Immunizations     13-VALENT PCV PREVNAR 12/19/2016    Influenza LAIV (Nasal) 11/16/2012, 10/22/2011    Influenza TIV (IM) 10/2/2014, 11/1/2013    Influenza Vaccine Adult HD 12/19/2016, 9/23/2015    Pneumococcal Vaccine (UF)Historical Data 3/1/2010    Pneumococcal polysaccharide vaccine (PPSV-23) 8/22/2017      Below and/or attached are the medications your provider expects you to take. Review all of your home medications and newly ordered medications with your provider and/or pharmacist. Follow medication instructions as directed by your provider and/or pharmacist. Please keep your medication list with you and share with your provider. Update the information when medications are discontinued, doses are changed, or new medications (including over-the-counter products) are added; and carry medication information at all times in the event of emergency situations     Allergies:  COLCHICINE - Diarrhea,Vomiting     IBUPROFEN - (reactions not documented)               Medications  Valid as of: August 22, 2017 - 12:17 PM    Generic Name Brand Name Tablet Size Instructions for use    Acetaminophen (Tab) TYLENOL 325 MG Take 2 Tabs by mouth every 6 hours as needed (Mild Pain; (Pain scale 1-3); Temp greater than 100.5 F).        Allopurinol (Tab) ZYLOPRIM 100 MG Take 2 Tabs by mouth every day.        AmLODIPine Besylate (Tab) NORVASC 5 MG Take 1 Tab by mouth every day.        Aspirin (Tablet Delayed Response) ECOTRIN 81 MG Take 81 mg by mouth every day.        Atorvastatin Calcium (Tab) LIPITOR 20 MG Take 1 Tab by mouth every day.        Carvedilol (Tab) COREG 12.5 MG Take 12.5 mg by mouth 2 Times a Day.        Clopidogrel  Bisulfate (Tab) PLAVIX 75 MG TAKE ONE TABLET BY MOUTH EVERY DAY        Cyanocobalamin (Tab) VITAMIN B12 1000 MCG Take 1 Tab by mouth every day.        Docusate Sodium (Cap) COLACE 100 MG Take 1 Cap by mouth every day.        Ergocalciferol (Cap) DRISDOL 52853 units Take 1 Cap by mouth every 14 days.        Ferrous Sulfate (Tab) Iron 325 (65 Fe) MG Take 1 Tab by mouth every Monday, Wednesday, and Friday.        Heparin Sodium (Porcine) (Solution) heparin 5000 UNIT/ML Inject 1 mL as instructed every 8 hours.        Irbesartan (Tab) AVAPRO 150 MG Take 1 Tab by mouth every day.        Levothyroxine Sodium (Tab) SYNTHROID 150 MCG Take 1 Tab by mouth Every morning on an empty stomach.        Meclizine HCl (Tab) ANTIVERT 25 MG Take 1 Tab by mouth every 6 hours as needed for Dizziness, Nausea/Vomiting or Vertigo.        Nitroglycerin (SL Tab) NITROSTAT 0.4 MG Place 1 Tab under tongue as needed for Chest Pain.        Ranolazine (TABLET SR 12 HR) RANEXA 500 MG Take 1 Tab by mouth 2 times a day.        Sennosides-Docusate Sodium (Tab) PERICOLACE or SENOKOT S 8.6-50 MG Take 2 Tabs by mouth 2 Times a Day.        Tamsulosin HCl (Cap) FLOMAX 0.4 MG TAKE  ONE CAPSULE BY MOUTH EVERY MORNING 30 MINUTES AFTER BREAKFAST        Terazosin HCl (Cap) HYTRIN 2 MG TAKE 1 CAPSULE BY MOUTH EVERY DAY        .                 Medicines prescribed today were sent to:     [facility] [place] [place] - [address] [facility]    [address] [facility] [address]    Phone: [phone] Fax: [phone]    Open 24 Hours?: No    [facility] - [place] - [address].    [address] [place] [address]    Phone: [phone] Fax: [phone]    Open 24 Hours?: No    [facility] - [place] - [address] [facility] [address] [address]    [address] [address]    Phone: [phone] Fax: [phone]    Open 24 Hours?: No      Medication refill instructions:       If your prescription bottle indicates you have medication refills  left, it is not necessary to call your provider’s office. Please contact your pharmacy and they will refill your medication.    If your prescription bottle indicates you do not have any refills left, you may request refills at any time through one of the following ways: The online [facility] system (except Urgent Care), by calling your provider’s office, or by asking your pharmacy to contact your provider’s office with a refill request. Medication refills are processed only during regular business hours and may not be available until the next business day. Your provider may request additional information or to have a follow-up visit with you prior to refilling your medication.   *Please Note: Medication refills are assigned a new Rx number when refilled electronically. Your pharmacy may indicate that no refills were authorized even though a new prescription for the same medication is available at the pharmacy. Please request the medicine by name with the pharmacy before contacting your provider for a refill.        Other Notes About Your Plan     Patient is enrolled in [facility] with Dr. Mccray    Pt goes Skip           [facility] Access Code: Activation code not generated  Current [facility] Status: Active

## 2017-09-04 PROBLEM — R45.1 AGITATION: Status: ACTIVE | Noted: 2017-01-01

## 2017-09-04 PROBLEM — R07.9 CHEST PAIN: Status: ACTIVE | Noted: 2017-01-01

## 2017-09-04 PROBLEM — N39.0 UTI (URINARY TRACT INFECTION): Status: ACTIVE | Noted: 2017-01-01

## 2017-09-05 PROBLEM — E87.5 HYPERKALEMIA: Status: ACTIVE | Noted: 2017-01-01

## 2017-09-05 PROBLEM — E03.9 HYPOTHYROID: Status: ACTIVE | Noted: 2017-01-01

## 2017-09-05 NOTE — PROGRESS NOTES
Bedside report received from night shift RN, assumed pt care. Pt assessment complete. Pt confused and alert only to self, this is patients baseline. Reviewed plan of care with pt. Tele box on and working. Pt on room air. NS IV running @ 83 mL/hr.  Chart and labs reviewed.  Bed in lowest position, call light within reach. Hourly rounding in place. Educated about calling for assistance, bed alarm on.

## 2017-09-05 NOTE — PROGRESS NOTES
Patient transferred from ED via gurney. Report received from Ike.   Assumed care of patient.   Pt is a/o, safety check performed, all possessions and call bell within reach.   POC and doctors orders addressed as needed.

## 2017-09-05 NOTE — ASSESSMENT & PLAN NOTE
States 3 episodes today, trend troponin, tele monitoring  No plan for stress test at this time unless symptom returns

## 2017-09-05 NOTE — PROGRESS NOTES
Pt has family and friends in room with him. Pt is becoming anxious, had CNA walk pt down the benjamin. Call light within reach hourly rounding in place, bed in lowest and locked position, hourly rounding in place.

## 2017-09-05 NOTE — H&P
Hospital Medicine History and Physical    Date of Service  9/5/2017    Chief Complaint  Chief Complaint   Patient presents with   • AL     care facility states he is acting paranoid about staff and refusing to eat A&Ox3 disoriented to time        History of Presenting Illness  84 y.o. male who presented 9/4/2017, He was sent in from his assisted living facility secondary to increased aggression and confusion. He usually has these symptoms when he has underlying infection. He also tells me that today he had 3 separate episodes of chest pain -substernal radiating to the right chest. He has a history of a AAA repair. At time of evaluation he states his chest pain is completely resolved. Initially at presentation to the emergency room he was very agitated and aggressive with staff however that has subsequently resolved and is pleasant and cooperative with care.    Primary Care Physician  Evelin Mccray M.D.    Consultants  None    Code Status  Advanced directive indicates DNR    Review of Systems  Review of Systems   Constitutional: Negative for chills and fever.   HENT: Negative for congestion.    Eyes: Negative for blurred vision and photophobia.   Respiratory: Negative for cough and shortness of breath.    Cardiovascular: Negative for chest pain, claudication and leg swelling.   Gastrointestinal: Negative for abdominal pain, constipation, diarrhea, heartburn and vomiting.   Genitourinary: Negative for dysuria and hematuria.   Musculoskeletal: Negative for joint pain and myalgias.   Skin: Negative for itching and rash.   Neurological: Negative for dizziness, sensory change, speech change, weakness and headaches.   Psychiatric/Behavioral: Negative for depression. The patient is not nervous/anxious and does not have insomnia.         Past Medical History  Past Medical History:   Diagnosis Date   • Abnormal electroencephalogram (EEG) 4/26/17   • Myocardial infarct (CMS-[facility]) 2000   • AAA (abdominal aortic aneurysm)  (CMS-HCC)     dissection with repair Type A   • Anemia    • Angina effort (CMS-HCC)    • Bifid sternum    • CAD (coronary artery disease)    • CKD (chronic kidney disease) stage 3, GFR 30-59 ml/min    • Closed Colles' fracture    • Dyslipidemia, goal LDL below 100    • Electroencephalogram (EEG) abnormality without seizure    • Encounter for interrogation of cardiac recorder    • Hemorrhagic disorder (CMS-HCC)    • Hypertension     Dr. Bloch pt states bp is running high   • Hypothyroidism    • Mitral valve regurgitation    • Paroxysmal atrial fibrillation (CMS-HCC)    • Severe aortic insufficiency    • Valvular heart disease     MR/AR   • Vitamin d deficiency        Surgical History  Past Surgical History:   Procedure Laterality Date   • RECOVERY  7/1/2016    Procedure: CATH LAB-LHC W/POSSIBLE-VERONIQUE;  Surgeon: Recoveryonly Surgery;  Location: SURGERY PRE-POST PROC UNIT [facility];  Service:    • RECOVERY  6/17/2016    Procedure: CATH LAB-LHC WITH POSSIBLE-ICHINO;  Surgeon: Recoveryonly Surgery;  Location: SURGERY PRE-POST PROC UNIT [facility];  Service:    • NECK MASS EXCISION  10/31/2014    Performed by Nathaniel Cavanaugh M.D. at SURGERY [facility]   • COLONOSCOPY  6/2010    in [place], normal   • AAA WITH STENT GRAFT     • CHOLECYSTECTOMY     • HERNIA REPAIR     • OTHER      Dr. Castro, muscle reattachment sternum   • OTHER      stenting   • OTHER CARDIAC SURGERY      AORTIC DISSECTION REPAIR.       Medications  No current facility-administered medications on file prior to encounter.      Current Outpatient Prescriptions on File Prior to Encounter   Medication Sig Dispense Refill   • carvedilol (COREG) 12.5 MG Tab Take 1 Tab by mouth 2 Times a Day. 180 Tab 0   • levothyroxine (SYNTHROID) 150 MCG Tab Take 1 Tab by mouth Every morning on an empty stomach. 90 Tab 3   • atorvastatin (LIPITOR) 20 MG Tab Take 1 Tab by mouth every day. 90 Tab 0   • irbesartan (AVAPRO) 150 MG Tab Take 1 Tab by mouth every day. 30 Tab 6   •  cyanocobalamin (VITAMIN B12) 1000 MCG Tab Take 1 Tab by mouth every day. 30 Tab 11   • RANEXA 500 MG TABLET SR 12 HR Take 1 Tab by mouth 2 times a day. 56 Tab 0   • docusate sodium (COLACE) 100 MG Cap Take 1 Cap by mouth every day. 30 Cap 6   • tamsulosin (FLOMAX) 0.4 MG capsule TAKE  ONE CAPSULE BY MOUTH EVERY MORNING 30 MINUTES AFTER BREAKFAST 90 Cap 3   • clopidogrel (PLAVIX) 75 MG Tab TAKE ONE TABLET BY MOUTH EVERY DAY 90 Tab 3   • terazosin (HYTRIN) 2 MG Cap TAKE 1 CAPSULE BY MOUTH EVERY DAY 90 Cap 3       Family History  Family History   Problem Relation Age of Onset   • Non-contributory Mother      very little history, no family history   • Cancer Mother      cancer of the jaw   • Cancer Maternal Grandmother 79     worked to death       Social History  Social History   Substance Use Topics   • Smoking status: Former Smoker     Types: Pipe     Quit date: 1983   • Smokeless tobacco: Never Used   • Alcohol use No      Comment: 1 glass wine/PM       Allergies  Allergies   Allergen Reactions   • Colchicine Diarrhea and Vomiting     Diarrhea, n/v   • Ibuprofen         Physical Exam  Laboratory   Hemodynamics  No data recorded.      Pulse  Av.8  Min: 52  Max: 63 Heart Rate (Monitored): (!) 56  Blood Pressure : (!) 177/48, NIBP: 149/47      Respiratory      Respiration: 15, Pulse Oximetry: 97 %             Physical Exam   Constitutional: He is oriented to person, place, and time. He appears well-developed and well-nourished. No distress.   HENT:   Head: Normocephalic and atraumatic.   Eyes: Conjunctivae are normal. No scleral icterus.   Neck: Neck supple. No JVD present.   Cardiovascular: Normal rate and regular rhythm.  Exam reveals no gallop and no friction rub.    No murmur heard.  Pulmonary/Chest: Effort normal and breath sounds normal. No respiratory distress. He has no wheezes. He exhibits no tenderness.   Abdominal: Soft. Bowel sounds are normal. He exhibits no distension and no mass. There is no  tenderness.   Musculoskeletal: He exhibits no edema or tenderness.   Neurological: He is alert and oriented to person, place, and time. No cranial nerve deficit.   Skin: Skin is warm and dry. He is not diaphoretic. No erythema. No pallor.   Psychiatric: He has a normal mood and affect. His behavior is normal.   Nursing note and vitals reviewed.      Recent Labs      09/04/17 1953   WBC  4.5*   RBC  3.74*   HEMOGLOBIN  11.9*   HEMATOCRIT  35.7*   MCV  95.5   MCH  31.8   MCHC  33.3*   RDW  48.3   PLATELETCT  106*   MPV  12.3     Recent Labs      09/04/17 1953   SODIUM  134*   POTASSIUM  6.1*   CHLORIDE  105   CO2  22   GLUCOSE  117*   BUN  66*   CREATININE  2.39*   CALCIUM  9.6     Recent Labs      09/04/17 1953   ALTSGPT  13   ASTSGOT  12   ALKPHOSPHAT  56   TBILIRUBIN  1.6*   GLUCOSE  117*                 Lab Results   Component Value Date    TROPONINI 0.02 09/04/2017     Urinalysis:    Lab Results  Component Value Date/Time   SPECGRAVITY 1.019 09/04/2017 2141   GLUCOSEUR Negative 09/04/2017 2141   KETONES Negative 09/04/2017 2141   NITRITE Negative 09/04/2017 2141   WBCURINE 0-2 (A) 09/04/2017 2141   RBCURINE 0-2 (A) 09/04/2017 2141   BACTERIA Negative 09/04/2017 2141   EPITHELCELL Negative 09/04/2017 2141        Imaging  Chest x-ray: No pulmonary infiltrate or consolidation      Assessment/Plan     I anticipate this patient will require at least two midnights for appropriate medical management, necessitating inpatient admission.    * Agitation   Assessment & Plan    Underlying dementia, ?UTI contribution  PRN haldol if aggressive on floor, avoid ativan          Hypothyroid   Assessment & Plan    Continue synthroid  Significantly elevated tsh 26.4 but this has improved compared to 2 months prior at 33.1 - may need increase of synthroid but will need to f/u with PCP as TSH could be acutely elevated d/t stress.          Hyperkalemia   Assessment & Plan    IVF  Kayexelate            Chest pain   Assessment &  Plan    States 3 episodes today, trend troponin, tele monitoring  No plan for stress test at this time unless symptom returns          UTI (urinary tract infection)   Assessment & Plan    Urine culture pending  LE on UA  Possible contribution to increased agitation  Empiric cipro PO          Dementia- (present on admission)   Assessment & Plan    Lives at [facility] longterm, increased agitation today              VTE prophylaxis: scd.

## 2017-09-05 NOTE — ED PROVIDER NOTES
"ED Provider Note    Scribed for Howard Nagel M.D. by Mathew Perez. 9/4/2017, 8:04 PM.    Primary care provider: Evelin Mccray M.D.  Means of arrival: Ambulance  History obtained from: Patient  History limited by: Patient's Mental Status    CHIEF COMPLAINT  Chief Complaint   Patient presents with   • [facility] facility states he is acting paranoid about staff and refusing to eat A&Ox3 disoriented to time        HPI  Erica Fisher is a 84 y.o. male with a history of dementia who presents to the Emergency Department for increasing altered level of consciousness. Per Nurse's note, the patient currently lives at [facility]. He is reported to have become increasingly confused and aggressive to the [facility] staff. The patient reports he has experience nausea the past four days, but has had no episodes of emesis. He also states he has experienced posterior thoracic back pain. He rates this pain as a 7/10. Additionally, the patient reports a \"squeezing\" type of pain in his chest.This was several days ago but does not currently have any pain.    HPI limited secondary to patient's mental status.    REVIEW OF SYSTEMS  Review of Systems   Gastrointestinal: Positive for nausea. Negative for diarrhea and vomiting.   Genitourinary: Negative for dysuria.   Musculoskeletal: Positive for back pain (Posterior Thoracic).   All other systems reviewed and are negative.    ROS is limited secondary to patient's mental status.  C.    PAST MEDICAL HISTORY   has a past medical history of AAA (abdominal aortic aneurysm) (CMS-HCC); Abnormal electroencephalogram (EEG) (4/26/17); Anemia; Angina effort (CMS-HCC); Bifid sternum; CAD (coronary artery disease); CKD (chronic kidney disease) stage 3, GFR 30-59 ml/min; Closed Colles' fracture; Dyslipidemia, goal LDL below 100; Electroencephalogram (EEG) abnormality without seizure; Encounter for interrogation of cardiac recorder; Hemorrhagic disorder (CMS-HCC); " "Hypertension; Hypothyroidism; Mitral valve regurgitation; Myocardial infarct (CMS-HCC) (2000); Paroxysmal atrial fibrillation (CMS-HCC); Severe aortic insufficiency; Valvular heart disease; and Vitamin d deficiency.    SURGICAL HISTORY   has a past surgical history that includes colonoscopy (6/2010); hernia repair; cholecystectomy; other cardiac surgery; neck mass excision (10/31/2014); aaa with stent graft; recovery (6/17/2016); other; other; and recovery (7/1/2016).    SOCIAL HISTORY  Social History   Substance Use Topics   • Smoking status: Former Smoker     Types: Pipe     Quit date: 7/6/1983   • Smokeless tobacco: Never Used   • Alcohol use No      Comment: 1 glass wine/PM      History   Drug Use No       FAMILY HISTORY  Family History   Problem Relation Age of Onset   • Non-contributory Mother      very little history, no family history   • Cancer Mother      cancer of the jaw   • Cancer Maternal Grandmother 79     worked to death       CURRENT MEDICATIONS  Home Medications     Reviewed by Ria Martienz (Pharmacy Tech) on 09/04/17 at 2248  Med List Status: Complete   Medication Last Dose Status   amlodipine (NORVASC) 5 MG Tab 9/4/2017 Active   atorvastatin (LIPITOR) 20 MG Tab 9/3/2017 Active   carvedilol (COREG) 12.5 MG Tab 9/4/2017 Active   clopidogrel (PLAVIX) 75 MG Tab 9/4/2017 Active   cyanocobalamin (VITAMIN B12) 1000 MCG Tab 9/4/2017 Active   docusate sodium (COLACE) 100 MG Cap 9/4/2017 Active   irbesartan (AVAPRO) 150 MG Tab 9/4/2017 Active   levothyroxine (SYNTHROID) 150 MCG Tab 9/4/2017 Active   RANEXA 500 MG TABLET SR 12 HR 9/4/2017 Active   tamsulosin (FLOMAX) 0.4 MG capsule 9/4/2017 Active   terazosin (HYTRIN) 2 MG Cap 9/4/2017 Active                ALLERGIES  Allergies   Allergen Reactions   • Colchicine Diarrhea and Vomiting     Diarrhea, n/v   • Ibuprofen        PHYSICAL EXAM  VITAL SIGNS: BP (!) 177/48   Pulse (!) 52   Resp 18   Ht 1.727 m (5' 8\")   Wt 66.2 kg (146 lb)   BMI 22.20 " kg/m²     Constitutional: Well developed, Well nourished,No acute distress.   HENT: Normocephalic, Atraumatic  Eyes: Conjunctiva normal, No discharge.   Cardiovascular: Normal heart rate, Normal rhythm, No murmurs, equal pulses.   Pulmonary: Normal breath sounds, No respiratory distress, No wheezing, No rales, No rhonchi.  Chest: No chest wall tenderness or deformity. No rash over the posterior chest   Abdomen:Soft, No tenderness, No masses, no rebound, no guarding.   Back: No CVA tenderness.   Musculoskeletal: No major deformities noted, No tenderness.   Skin: Warm, Dry, No erythema, No rash.   Neurologic: Alert & oriented to self and location but not year or time, 5/5 strength in upper and lower extremities, Cranial Nerves II-XII intact, Normal motor function,  No focal deficits noted.        LABS  Results for orders placed or performed during the hospital encounter of 09/04/17   CBC WITH DIFFERENTIAL   Result Value Ref Range    WBC 4.5 (L) 4.8 - 10.8 K/uL    RBC 3.74 (L) 4.70 - 6.10 M/uL    Hemoglobin 11.9 (L) 14.0 - 18.0 g/dL    Hematocrit 35.7 (L) 42.0 - 52.0 %    MCV 95.5 81.4 - 97.8 fL    MCH 31.8 27.0 - 33.0 pg    MCHC 33.3 (L) 33.7 - 35.3 g/dL    RDW 48.3 35.9 - 50.0 fL    Platelet Count 106 (L) 164 - 446 K/uL    MPV 12.3 9.0 - 12.9 fL    Neutrophils-Polys 64.20 44.00 - 72.00 %    Lymphocytes 23.50 22.00 - 41.00 %    Monocytes 9.40 0.00 - 13.40 %    Eosinophils 1.80 0.00 - 6.90 %    Basophils 0.90 0.00 - 1.80 %    Immature Granulocytes 0.20 0.00 - 0.90 %    Nucleated RBC 0.00 /100 WBC    Neutrophils (Absolute) 2.87 1.82 - 7.42 K/uL    Lymphs (Absolute) 1.05 1.00 - 4.80 K/uL    Monos (Absolute) 0.42 0.00 - 0.85 K/uL    Eos (Absolute) 0.08 0.00 - 0.51 K/uL    Baso (Absolute) 0.04 0.00 - 0.12 K/uL    Immature Granulocytes (abs) 0.01 0.00 - 0.11 K/uL    NRBC (Absolute) 0.00 K/uL   COMP METABOLIC PANEL   Result Value Ref Range    Sodium 134 (L) 135 - 145 mmol/L    Potassium 6.1 (H) 3.6 - 5.5 mmol/L    Chloride  105 96 - 112 mmol/L    Co2 22 20 - 33 mmol/L    Anion Gap 7.0 0.0 - 11.9    Glucose 117 (H) 65 - 99 mg/dL    Bun 66 (H) 8 - 22 mg/dL    Creatinine 2.39 (H) 0.50 - 1.40 mg/dL    Calcium 9.6 8.5 - 10.5 mg/dL    AST(SGOT) 12 12 - 45 U/L    ALT(SGPT) 13 2 - 50 U/L    Alkaline Phosphatase 56 30 - 99 U/L    Total Bilirubin 1.6 (H) 0.1 - 1.5 mg/dL    Albumin 3.9 3.2 - 4.9 g/dL    Total Protein 6.5 6.0 - 8.2 g/dL    Globulin 2.6 1.9 - 3.5 g/dL    A-G Ratio 1.5 g/dL   TSH   Result Value Ref Range    TSH 26.420 (H) 0.300 - 3.700 uIU/mL   URINALYSIS,CULTURE IF INDICATED   Result Value Ref Range    Color Yellow     Character Clear     Specific Gravity 1.019 <1.035    Ph 5.0 5.0 - 8.0    Glucose Negative Negative mg/dL    Ketones Negative Negative mg/dL    Protein Negative Negative mg/dL    Bilirubin Negative Negative    Urobilinogen, Urine 0.2 Negative    Nitrite Negative Negative    Leukocyte Esterase Trace (A) Negative    Occult Blood Negative Negative    Micro Urine Req Microscopic     Culture Indicated Yes UA Culture   ESTIMATED GFR   Result Value Ref Range    GFR If  31 (A) >60 mL/min/1.73 m 2    GFR If Non African American 26 (A) >60 mL/min/1.73 m 2   TROPONIN   Result Value Ref Range    Troponin I 0.02 0.00 - 0.04 ng/mL   URINE MICROSCOPIC (W/UA)   Result Value Ref Range    WBC 0-2 (A) /hpf    RBC 0-2 (A) /hpf    Bacteria Negative None /hpf    Epithelial Cells Negative /hpf    Hyaline Cast 0-2 /lpf   BASIC METABOLIC PANEL (In 4 hours. Enter time)   Result Value Ref Range    Sodium 135 135 - 145 mmol/L    Potassium 5.1 3.6 - 5.5 mmol/L    Chloride 108 96 - 112 mmol/L    Co2 20 20 - 33 mmol/L    Glucose 86 65 - 99 mg/dL    Bun 59 (H) 8 - 22 mg/dL    Creatinine 2.30 (H) 0.50 - 1.40 mg/dL    Calcium 9.5 8.5 - 10.5 mg/dL    Anion Gap 7.0 0.0 - 11.9   CBC with Differential   Result Value Ref Range    WBC 5.0 4.8 - 10.8 K/uL    RBC 3.56 (L) 4.70 - 6.10 M/uL    Hemoglobin 11.4 (L) 14.0 - 18.0 g/dL    Hematocrit  34.0 (L) 42.0 - 52.0 %    MCV 95.5 81.4 - 97.8 fL    MCH 32.0 27.0 - 33.0 pg    MCHC 33.5 (L) 33.7 - 35.3 g/dL    RDW 47.9 35.9 - 50.0 fL    Platelet Count 107 (L) 164 - 446 K/uL    MPV 12.3 9.0 - 12.9 fL    Neutrophils-Polys 67.50 44.00 - 72.00 %    Lymphocytes 19.20 (L) 22.00 - 41.00 %    Monocytes 10.70 0.00 - 13.40 %    Eosinophils 1.80 0.00 - 6.90 %    Basophils 0.40 0.00 - 1.80 %    Immature Granulocytes 0.40 0.00 - 0.90 %    Nucleated RBC 0.00 /100 WBC    Neutrophils (Absolute) 3.34 1.82 - 7.42 K/uL    Lymphs (Absolute) 0.95 (L) 1.00 - 4.80 K/uL    Monos (Absolute) 0.53 0.00 - 0.85 K/uL    Eos (Absolute) 0.09 0.00 - 0.51 K/uL    Baso (Absolute) 0.02 0.00 - 0.12 K/uL    Immature Granulocytes (abs) 0.02 0.00 - 0.11 K/uL    NRBC (Absolute) 0.00 K/uL   Troponin - STAT Once   Result Value Ref Range    Troponin I 0.01 0.00 - 0.04 ng/mL   ESTIMATED GFR   Result Value Ref Range    GFR If  33 (A) >60 mL/min/1.73 m 2    GFR If Non  27 (A) >60 mL/min/1.73 m 2   EKG (ER)   Result Value Ref Range    Report       [facility] Emergency Dept.    Test Date:  2017  Pt Name:    JUMA ALEJANDRO                Department: ER  MRN:        [id number]                      Room:       RD 01  Gender:     M                            Technician: [id number]  :        [id number]                   Requested By:OTTONIEL VELASCO  Order #:    [id number]                    Reading MD:    Measurements  Intervals                                Axis  Rate:       58                           P:  OK:                                      QRS:        -54  QRSD:       122                          T:          88  QT:         440  QTc:        433    Interpretive Statements  ATRIAL FIBRILLATION  NONSPECIFIC IVCD WITH LAD  CONSIDER ANTERIOR INFARCT  Compared to ECG 05/15/2017 19:25:05  Myocardial infarct finding now present  Bradycardia, nonsinus no longer present  Atrial premature complex(es) no  longer present       All labs reviewed by me.    EKG  12 Lead EKG interpreted by me shows atrial fibrillation at a rate of 58. Leftward Simpson. Left Bundle Branch Block. No ST elevations. No T wave inversions. Old EKG from 4/29 shows no significant changes. Final impression: atrial fibrillation with no acute changes.    RADIOLOGY  DX-CHEST-LIMITED (1 VIEW)   Final Result         No pulmonary infiltrates or consolidations are noted.      Stable cardiomegaly.        The radiologist's interpretation of all radiological studies have been reviewed by me.    COURSE & MEDICAL DECISION MAKING  Pertinent Labs & Imaging studies reviewed. (See chart for details)    8:06 PM - Obtained and reviewed past medical records which indicated the patient was last seen by his primary care physician 8/22/17 for confusion. The patient was demential at the time. Records also show the patient was last admitted to the ED 5/28/17 for confusion.     8:20 PM - Patient seen and examined at bedside. Ordered Blood Culture, CMP, CBC, urine drug screen, DX-Chest to evaluate his symptoms. The differential diagnoses include but are not limited to: infectious processes, Myocardial Infarction, hypothyroidism, worsening dementia.    9:47 PM - Consult with Hospitalist, Dr. Allen, who agrees to admit patient.     Medical Decision Making:Patient presents with increased confusion. At this point time I do not finding exact source. Patient does appear to be somewhat dehydrated with acute on chronic renal injury and hyperkalemic. Patient was treated with dextrose, insulin, IV fluids. Patient also appears to be severely hypothyroid despite eating on levothyroxine. Patient be admitted for electrolyte correction. Patient does not have any obvious EKG changes. Do not think he needs emergent dialysis at this point in time.       DISPOSITION:  Patient will be admitted to Dr. Allen in guarded condition.      FINAL IMPRESSION  1. Hyperkalemia    2. Acute kidney injury  superimposed on chronic kidney disease    3. Confusion and disorientation    4. Hypothyroidism, unspecified type          SETH, Mathew Perez (Scribriddhi), am scribing for, and in the presence of, Howard Nagel M.D.    Electronically signed by: Mathew Perez (Scribriddhi), 9/4/2017    IHoward M.D. personally performed the services described in this documentation, as scribed by Mathew Perez in my presence, and it is both accurate and complete.    The note accurately reflects work and decisions made by me.  Howard Nagel  9/4/2017  10:57 PM

## 2017-09-05 NOTE — DISCHARGE PLANNING
Care Transition Team Assessment    IHD met with patient bedside. He remains confused but was able to answer most screening questions. He stated he does not use any DME or O2 at home, and he lives in [facility]. He does not think he will need any extra services after discharging.     Information Source  Orientation : Disoriented to Event, Disoriented to Time  Information Given By: Patient  Informant's Name: Erica  Who is responsible for making decisions for patient? : Patient    Readmission Evaluation  Is this a readmission?: No    Elopement Risk  Legal Hold: No  Ambulatory or Self Mobile in Wheelchair: No-Not an Elopement Risk    Interdisciplinary Discharge Planning  Primary Care Physician: Dr. Evelin Mccray  Name of Care Facility: [facility]  Assistance Needed: Yes    Discharge Preparedness  What is your plan after discharge?: Home with help  What are your discharge supports?: Child  Prior Functional Level: Ambulatory  Difficulity with ADLs: None    Functional Assesment  Prior Functional Level: Ambulatory    Finances  Financial Barriers to Discharge: No  Prescription Coverage: Yes ([employer])    Vision / Hearing Impairment  Vision Impairment : No  Hearing Impairment : Yes  Hearing Impairment: Hearing Device Not Available  Does Pt Need Special Equipment for the Hearing Impaired?: No    Values / Beliefs / Concerns  Values / Beliefs Concerns :  (pt is poor historian and confused to hospitalizaton.)    Advance Directive  Advance Directive?: DPOA for Health Care  Durable Power of  Name and Contact : Anibal Fisher, #[phone]    Domestic Abuse  Have you ever been the victim of abuse or violence?: Not Sure  Physical Abuse or Sexual Abuse: Unable to Assess due to Patient Condition  Verbal Abuse or Emotional Abuse: Unable to Assess due to Patient Condition    Psychological Assessment  History of Substance Abuse: None  History of Psychiatric Problems: No  Non-compliant with Treatment: No  Newly Diagnosed  Illness: No    Discharge Risks or Barriers  Discharge risks or barriers?: No    Anticipated Discharge Information  Anticipated discharge disposition: Discharge needs currently unknown  Discharge Address: [address] [place] [address] Way, [place] [address]  Discharge Contact Phone Number: [phone]

## 2017-09-05 NOTE — ED NOTES
Med rec updated and complete.  Allergies reviewed.  Per MARS from transferring facility.  Pt did not provided any additional   Information at this time.

## 2017-09-05 NOTE — ED NOTES
Room 1    MABLE POLLACK for ALOC from [facility].  Pt has become increasinly paranoid of staff and more altered.  Currently pt is speaking it riddles and not making sence.  Pt also gets upset easily and becomes slightly aggressive.  Pt states nothing hurts on him and doesn't know why he is here.      .  Chief Complaint   Patient presents with   • ALOC     care facility states he is acting paranoid about staff and refusing to eat A&Ox3 disoriented to time

## 2017-09-05 NOTE — PROGRESS NOTES
Pt is sleeping, bed alarm is on and in place, call light within reach, bed in lowest and locked position, hourly rounding in place. Pt educated on calling, no signs of learning.

## 2017-09-05 NOTE — ASSESSMENT & PLAN NOTE
Continue synthroid  Significantly elevated tsh 26.4 but this has improved compared to 2 months prior at 33.1 - may need increase of synthroid but will need to f/u with PCP as TSH could be acutely elevated d/t stress.

## 2017-09-06 PROBLEM — R45.1 AGITATION: Status: RESOLVED | Noted: 2017-01-01 | Resolved: 2017-01-01

## 2017-09-06 PROBLEM — N39.0 UTI (URINARY TRACT INFECTION): Status: RESOLVED | Noted: 2017-01-01 | Resolved: 2017-01-01

## 2017-09-06 PROBLEM — R07.9 CHEST PAIN: Status: RESOLVED | Noted: 2017-01-01 | Resolved: 2017-01-01

## 2017-09-06 PROBLEM — E87.5 HYPERKALEMIA: Status: RESOLVED | Noted: 2017-01-01 | Resolved: 2017-01-01

## 2017-09-06 NOTE — PROGRESS NOTES
Beside shift report taken with the night shift nurse. Pt confused. Pt at this time not aggressive. Pt doesn't understand they are a fall risk. Fall precautions in place. Bed alarm set. No distress noted. Planned d/c today.

## 2017-09-06 NOTE — DISCHARGE INSTRUCTIONS
Discharge Instructions    Discharged to group home by medical transportation with escort. Discharged via wheelchair, hospital escort: Yes.  Special equipment needed: Not Applicable    Be sure to schedule a follow-up appointment with your primary care doctor or any specialists as instructed.     Discharge Plan:   Influenza Vaccine Indication: Patient Refuses    I understand that a diet low in cholesterol, fat, and sodium is recommended for good health. Unless I have been given specific instructions below for another diet, I accept this instruction as my diet prescription.   Other diet: regluar      Special Instructions: None    · Is patient discharged on Warfarin / Coumadin?   No     · Is patient Post Blood Transfusion?  No    Depression / Suicide Risk    As you are discharged from this [facility] facility, it is important to learn how to keep safe from harming yourself.    Recognize the warning signs:  · Abrupt changes in personality, positive or negative- including increase in energy   · Giving away possessions  · Change in eating patterns- significant weight changes-  positive or negative  · Change in sleeping patterns- unable to sleep or sleeping all the time   · Unwillingness or inability to communicate  · Depression  · Unusual sadness, discouragement and loneliness  · Talk of wanting to die  · Neglect of personal appearance   · Rebelliousness- reckless behavior  · Withdrawal from people/activities they love  · Confusion- inability to concentrate     If you or a loved one observes any of these behaviors or has concerns about self-harm, here's what you can do:  · Talk about it- your feelings and reasons for harming yourself  · Remove any means that you might use to hurt yourself (examples: pills, rope, extension cords, firearm)  · Get professional help from the community (Mental Health, Substance Abuse, psychological counseling)  · Do not be alone:Call your Safe Contact- someone whom you trust who will be  there for you.  · Call your local CRISIS HOTLINE 208-3046 or 269-128-8249  · Call your local Children's Mobile Crisis Response Team [place] (284) 773-2389 or www.[employer]  · Call the toll free National Suicide Prevention Hotlines   · National Suicide Prevention Lifeline 930-724-HARG (7785)  · National citiservi Line Network 800-SUICIDE (315-8853)      Urinary Tract Infection  Urinary tract infections (UTIs) can develop anywhere along your urinary tract. Your urinary tract is your body's drainage system for removing wastes and extra water. Your urinary tract includes two kidneys, two ureters, a bladder, and a urethra. Your kidneys are a pair of bean-shaped organs. Each kidney is about the size of your fist. They are located below your ribs, one on each side of your spine.  CAUSES  Infections are caused by microbes, which are microscopic organisms, including fungi, viruses, and bacteria. These organisms are so small that they can only be seen through a microscope. Bacteria are the microbes that most commonly cause UTIs.  SYMPTOMS   Symptoms of UTIs may vary by age and gender of the patient and by the location of the infection. Symptoms in young women typically include a frequent and intense urge to urinate and a painful, burning feeling in the bladder or urethra during urination. Older women and men are more likely to be tired, shaky, and weak and have muscle aches and abdominal pain. A fever may mean the infection is in your kidneys. Other symptoms of a kidney infection include pain in your back or sides below the ribs, nausea, and vomiting.  DIAGNOSIS  To diagnose a UTI, your caregiver will ask you about your symptoms. Your caregiver also will ask to provide a urine sample. The urine sample will be tested for bacteria and white blood cells. White blood cells are made by your body to help fight infection.  TREATMENT   Typically, UTIs can be treated with medication. Because most UTIs are caused by a bacterial  infection, they usually can be treated with the use of antibiotics. The choice of antibiotic and length of treatment depend on your symptoms and the type of bacteria causing your infection.  HOME CARE INSTRUCTIONS  · If you were prescribed antibiotics, take them exactly as your caregiver instructs you. Finish the medication even if you feel better after you have only taken some of the medication.  · Drink enough water and fluids to keep your urine clear or pale yellow.  · Avoid caffeine, tea, and carbonated beverages. They tend to irritate your bladder.  · Empty your bladder often. Avoid holding urine for long periods of time.  · Empty your bladder before and after sexual intercourse.  · After a bowel movement, women should cleanse from front to back. Use each tissue only once.  SEEK MEDICAL CARE IF:   · You have back pain.  · You develop a fever.  · Your symptoms do not begin to resolve within 3 days.  SEEK IMMEDIATE MEDICAL CARE IF:   · You have severe back pain or lower abdominal pain.  · You develop chills.  · You have nausea or vomiting.  · You have continued burning or discomfort with urination.  MAKE SURE YOU:   · Understand these instructions.  · Will watch your condition.  · Will get help right away if you are not doing well or get worse.     This information is not intended to replace advice given to you by your health care provider. Make sure you discuss any questions you have with your health care provider.     Document Released: 09/27/2006 Document Revised: 01/08/2016 Document Reviewed: 01/25/2013  [employer] Interactive Patient Education ©2016 Elsevier Inc.    Ciprofloxacin tablets  What is this medicine?  CIPROFLOXACIN (sip ciarra FLOX a sin) is a quinolone antibiotic. It is used to treat certain kinds of bacterial infections. It will not work for colds, flu, or other viral infections.  This medicine may be used for other purposes; ask your health care provider or pharmacist if you have questions.  COMMON  BRAND NAME(S): Cipro  What should I tell my health care provider before I take this medicine?  They need to know if you have any of these conditions:  -bone problems  -cerebral disease  -joint problems  -irregular heartbeat  -kidney disease  -liver disease  -myasthenia gravis  -seizure disorder  -tendon problems  -an unusual or allergic reaction to ciprofloxacin, other antibiotics or medicines, foods, dyes, or preservatives  -pregnant or trying to get pregnant  -breast-feeding  How should I use this medicine?  Take this medicine by mouth with a glass of water. Follow the directions on the prescription label. Take your medicine at regular intervals. Do not take your medicine more often than directed. Take all of your medicine as directed even if you think your are better. Do not skip doses or stop your medicine early.  You can take this medicine with food or on an empty stomach. It can be taken with a meal that contains dairy or calcium, but do not take it alone with a dairy product, like milk or yogurt or calcium-fortified juice.  A special MedGuide will be given to you by the pharmacist with each prescription and refill. Be sure to read this information carefully each time.  Talk to your pediatrician regarding the use of this medicine in children. Special care may be needed.  Overdosage: If you think you have taken too much of this medicine contact a poison control center or emergency room at once.  NOTE: This medicine is only for you. Do not share this medicine with others.  What if I miss a dose?  If you miss a dose, take it as soon as you can. If it is almost time for your next dose, take only that dose. Do not take double or extra doses.  What may interact with this medicine?  Do not take this medicine with any of the following medications:  -cisapride  -droperidol  -terfenadine  -tizanidine  This medicine may also interact with the following medications:  -antacids  -caffeine  -cyclosporin  -didanosine (ddI)  buffered tablets or powder  -medicines for diabetes  -medicines for inflammation like ibuprofen, naproxen  -methotrexate  -multivitamins  -omeprazole  -phenytoin  -probenecid  -sucralfate  -theophylline  -warfarin  This list may not describe all possible interactions. Give your health care provider a list of all the medicines, herbs, non-prescription drugs, or dietary supplements you use. Also tell them if you smoke, drink alcohol, or use illegal drugs. Some items may interact with your medicine.  What should I watch for while using this medicine?  Tell your doctor or health care professional if your symptoms do not improve.  Do not treat diarrhea with over the counter products. Contact your doctor if you have diarrhea that lasts more than 2 days or if it is severe and watery.  You may get drowsy or dizzy. Do not drive, use machinery, or do anything that needs mental alertness until you know how this medicine affects you. Do not stand or sit up quickly, especially if you are an older patient. This reduces the risk of dizzy or fainting spells.  This medicine can make you more sensitive to the sun. Keep out of the sun. If you cannot avoid being in the sun, wear protective clothing and use sunscreen. Do not use sun lamps or tanning beds/booths.  Avoid antacids, aluminum, calcium, iron, magnesium, and zinc products for 6 hours before and 2 hours after taking a dose of this medicine.  What side effects may I notice from receiving this medicine?  Side effects that you should report to your doctor or health care professional as soon as possible:  -  allergic reactions like skin rash, itching or hives, swelling of the face, lips, or tongue  -  breathing problems  -  confusion, nightmares or hallucinations  -  feeling faint or lightheaded, falls  -  irregular heartbeat  -  joint, muscle or tendon pain or swelling  -  pain or trouble passing urine  -persistent headache with or without blurred vision  -  redness, blistering,  peeling or loosening of the skin, including inside the mouth  -  seizure  -  unusual pain, numbness, tingling, or weakness  Side effects that usually do not require medical attention (report to your doctor or health care professional if they continue or are bothersome):  -  diarrhea  -  nausea or stomach upset  -  white patches or sores in the mouth  This list may not describe all possible side effects. Call your doctor for medical advice about side effects. You may report side effects to FDA at 5-232-XGL-5849.  Where should I keep my medicine?  Keep out of the reach of children.  Store at room temperature below 30 degrees C (86 degrees F). Keep container tightly closed. Throw away any unused medicine after the expiration date.  NOTE: This sheet is a summary. It may not cover all possible information. If you have questions about this medicine, talk to your doctor, pharmacist, or health care provider.  © 2014, Elsevier/[employer]. (1/4/2013 12:53:06 PM)

## 2017-09-06 NOTE — DISCHARGE PLANNING
Medical Social Work    SW spoke with [facility]. They will speak with  and call this SW back with a  time.

## 2017-09-06 NOTE — DISCHARGE PLANNING
Medical Social Work    [facility] will pick pt up downstairs in front of [employer] at 1pm. Charge RN notified.

## 2017-09-07 NOTE — DISCHARGE SUMMARY
CHIEF COMPLAINT ON ADMISSION  Chief Complaint   Patient presents with   • [facility]     care facility states he is acting paranoid about staff and refusing to eat A&Ox3 disoriented to time        CODE STATUS  DNR    HPI & HOSPITAL COURSE  This is a 84 y.o. male here with AMS, please see original H&P for specific information patient was admitted due to agitation likely due to acute encephalopathy toxic due to uti, patient was started on atb and patient responded well to treatment, he is feeling much better now, he has h/o dementia and he is back to his baseline, he is ambulating, no fever not in distress, patient has h/o CKD baseline Cr around 2, today is 2.3 and trending down, he is tolerating diet, and fluids, patient will need to repeat bmp in 1 week to check for his Cr level also needs to drink plenty of fluids, he will continue on po cipro for 5 more days, dose adjusted to his Cr levels, he is afebrile and back to baseline.   Therefore, he is discharged in fair and stable condition with close outpatient follow-up.    SPECIFIC OUTPATIENT FOLLOW-UP  PCP in 1 week  Needs BMP in 1 week.    DISCHARGE PROBLEM LIST  Principal Problem (Resolved):    Agitation POA: Unknown  Active Problems:    Dementia POA: Yes    Hypothyroid POA: Yes  Resolved Problems:    UTI (urinary tract infection) POA: Unknown    Chest pain POA: Unknown    Hyperkalemia POA: Unknown  CKD stage 3, needs f/u with bmp in 1 week    FOLLOW UP  Future Appointments  Date Time Provider Department Center   10/24/2017 2:45 PM [place] M.D. [facility].     No follow-up provider specified.    MEDICATIONS ON DISCHARGE   Erica Fisher   Home Medication Instructions KAIT:[id number]    Printed on:09/06/17 8665   Medication Information                      amlodipine (NORVASC) 5 MG Tab  Take 5 mg by mouth every day.             atorvastatin (LIPITOR) 20 MG Tab  Take 1 Tab by mouth every day.             carvedilol (COREG) 12.5 MG Tab  Take 1 Tab by mouth 2 Times  a Day.             ciprofloxacin (CIPRO) 500 MG Tab  Take 1 Tab by mouth every day.             clopidogrel (PLAVIX) 75 MG Tab  TAKE ONE TABLET BY MOUTH EVERY DAY             cyanocobalamin (VITAMIN B12) 1000 MCG Tab  Take 1 Tab by mouth every day.             docusate sodium (COLACE) 100 MG Cap  Take 1 Cap by mouth every day.             irbesartan (AVAPRO) 150 MG Tab  Take 1 Tab by mouth every day.             levothyroxine (SYNTHROID) 150 MCG Tab  Take 1 Tab by mouth Every morning on an empty stomach.             RANEXA 500 MG TABLET SR 12 HR  Take 1 Tab by mouth 2 times a day.             tamsulosin (FLOMAX) 0.4 MG capsule  TAKE  ONE CAPSULE BY MOUTH EVERY MORNING 30 MINUTES AFTER BREAKFAST             terazosin (HYTRIN) 2 MG Cap  TAKE 1 CAPSULE BY MOUTH EVERY DAY                 DIET  Regular diet    ACTIVITY  As tolerated.  Weight bearing as tolerated      CONSULTATIONS  none    PROCEDURES  none    LABORATORY  Lab Results   Component Value Date/Time    SODIUM 135 09/05/2017 02:16 AM    POTASSIUM 5.1 09/05/2017 02:16 AM    CHLORIDE 108 09/05/2017 02:16 AM    CO2 20 09/05/2017 02:16 AM    GLUCOSE 86 09/05/2017 02:16 AM    BUN 59 (H) 09/05/2017 02:16 AM    CREATININE 2.30 (H) 09/05/2017 02:16 AM        Lab Results   Component Value Date/Time    WBC 5.0 09/05/2017 02:16 AM    HEMOGLOBIN 11.4 (L) 09/05/2017 02:16 AM    HEMATOCRIT 34.0 (L) 09/05/2017 02:16 AM    PLATELETCT 107 (L) 09/05/2017 02:16 AM        Total time of the discharge process exceeds 32 minutes

## 2017-09-08 PROBLEM — E46 PROTEIN CALORIE MALNUTRITION (HCC): Status: ACTIVE | Noted: 2017-01-01

## 2017-09-08 NOTE — TELEPHONE ENCOUNTER
FAX  1. Caller Name: fax from [facility] [place] specialty pharmacy                      Call Back Number: [phone]    2. Message: [facility] requesting a prescription for Boost High Protein liquid SIG: drink 1 bottle by mouth twice a day.     3. Patient approves office to leave a detailed voicemail/MyChart message: N\A

## 2017-09-18 NOTE — TELEPHONE ENCOUNTER
"1. Caller Name: Erica Fisher                      Call Back Number: [phone]    2. Message: pt called to relate a message to Dr. Mccray \"Financial founds not available, secure founds , need to talk.\" call him     3. Patient approves office to leave a detailed voicemail/MyChart message: yes      "

## 2017-09-21 NOTE — TELEPHONE ENCOUNTER
Called back at above number. We will also send a MyChart reply. The above number turned out to be the number for the desk at the assisted living he is at.  Discussed with them and as far as they are aware there are no current financial difficulties. They will ask the  to meet with him.

## 2017-10-09 NOTE — TELEPHONE ENCOUNTER
1. Caller Name:goran                       Call Back Number: [phone]    2. Message: patient called and is requesting a call back from you. Pt did not elaborate and states he has a lot to discuss with you. Please advise, thank you     3. Patient approves office to leave a detailed voicemail/MyChart message: yes

## 2017-10-10 NOTE — TELEPHONE ENCOUNTER
Once again when calling this patient I get the message that the voice mail box is full. No responses on my chart.  I will text his son and see if this is anything I need to be doing.

## 2017-10-10 NOTE — TELEPHONE ENCOUNTER
Was able to communicate with the son who will be communicating with laly today. His son has placed a call to [place] as well.

## 2017-10-10 NOTE — PROGRESS NOTES
Patient Erica Fisher discharged on 9/06/2017. [facility] Patient Advocate assisted with multiple discharge orders including confirming the patient followed up with his primary care physician. The patient did not follow-up with his primary physician as ordered. [facility] attempted multiple outreach calls to the patient and emergency contacts was not able to reach either the patient or emergency contacts.  [facility] has been in contact with patient residence facility [facility] to get updates on patient. The patient has one future nephrology appointment scheduled.

## 2017-10-17 NOTE — TELEPHONE ENCOUNTER
Called patient no answer, and no VM available.  Unable to leave message.  Will try to contact patient again later.

## 2017-10-17 NOTE — TELEPHONE ENCOUNTER
----- Message from Domingo Mccray M.D. sent at 10/17/2017  8:40 AM PDT -----  The anemia is improved.  Your thyroid continues to be off.  I will increase your thyroid medication dose.

## 2017-10-18 NOTE — TELEPHONE ENCOUNTER
Called patient to both phone on file to answer and no VM available.  [employer] message sent to patient with information.

## 2017-11-10 NOTE — PROGRESS NOTES
"Subjective:      Erica Fisher is a 84 y.o. male who presents with Follow-Up            HPI  84 year old with a history of HTN, CAD, aortic dissection who has CKD stage IV  States he continues to feel a bit slow from his baseline.     1. CKD stage III - Cr 2.3, up slightly from previously, but no problems urinating or with volume.  2. HTN - SBP remains in the 150s, taking medications as prescribed.      Review of Systems   Constitutional: Positive for malaise/fatigue. Negative for chills and fever.   Cardiovascular: Negative for chest pain.          Objective:     /50   Pulse (!) 56   Temp 36.4 °C (97.6 °F) (Temporal)   Resp 16   Ht 1.727 m (5' 8\")   Wt 67.6 kg (149 lb)   BMI 22.66 kg/m²      Physical Exam   Constitutional: He is oriented to person, place, and time. He appears well-developed and well-nourished.   Cardiovascular: Normal rate and regular rhythm.    Pulmonary/Chest: Effort normal and breath sounds normal.   Neurological: He is alert and oriented to person, place, and time.   Skin: Skin is warm and dry.   Psychiatric: He has a normal mood and affect. His behavior is normal.               Assessment/Plan:     1. Chronic kidney disease (CKD), stage III (moderate)  From a renal standpoint, Cr has been relatively stable. Cr is 2012 was around 2, and currently 2.3. This is better than expected renal decline with age. At this point, on avapro. Would continue current management. Unlikely to need RRT.    - CBC WITHOUT DIFFERENTIAL; Future  - BASIC METABOLIC PANEL; Future  - PTH INTACT (PTH ONLY); Future  - VITAMIN D,25 HYDROXY; Future  - MICROALBUMIN CREAT RATIO URINE; Future    2. Essential hypertension  SBP in the 150s, he seems to be doing well with this. Continue current management.      "

## 2017-11-20 NOTE — TELEPHONE ENCOUNTER
Called [facility] to carify dosage of Levothyroxine 175mg . Problem was already resolved. New dosage change communicated to them.  Thank You !

## 2017-11-21 NOTE — ED PROVIDER NOTES
ED Provider Note    Scribed for Twin Cronin M.D. by Allan Brizuela. 11/21/2017  12:15 PM    Primary care provider: Domingo Mccray M.D.  Means of arrival: EMS  History obtained from: patient  History limited by: dementia    CHIEF COMPLAINT  Chief Complaint   Patient presents with   • Chest Pain       HPI  Erica Fisher is a 84 y.o. male who presents to the Emergency Department complaining of central chest pain for the last 3 days. Patient describes his chest pain as sharp, stabbing that is constant and does not radiate. He reports associated fatigue. Patient reports a history of admission to the hospital secondary to pneumonia. He also has a history and states that he is losing his memory rapidly. Patient denies shortness of breath, abdominal pain.     Further HPI cannot be obtained due to the patient's dementia.    REVIEW OF SYSTEMS  Pertinent positives include chest pain, fatigue. Pertinent negatives include no shortness of breath, abdominal pain.     Further ROS cannot be obtained due to the patient's dementia.  C.    PAST MEDICAL HISTORY   has a past medical history of AAA (abdominal aortic aneurysm) (CMS-[facility]); Abnormal electroencephalogram (EEG) (4/26/17); Anemia; Angina effort (CMS-HCC); Bifid sternum; CAD (coronary artery disease); CKD (chronic kidney disease) stage 3, GFR 30-59 ml/min; Closed Colles' fracture; Dyslipidemia, goal LDL below 100; Electroencephalogram (EEG) abnormality without seizure; Encounter for interrogation of cardiac recorder; Hemorrhagic disorder (CMS-HCC); Hypertension; Hypothyroidism; Mitral valve regurgitation; Myocardial infarct (2000); Paroxysmal atrial fibrillation (CMS-HCC); Severe aortic insufficiency; Valvular heart disease; and Vitamin d deficiency.    SURGICAL HISTORY   has a past surgical history that includes colonoscopy (6/2010); hernia repair; cholecystectomy; other cardiac surgery; neck mass excision (10/31/2014); aaa with stent graft; recovery (6/17/2016);  other; other; and recovery (7/1/2016).    SOCIAL HISTORY  Social History   Substance Use Topics   • Smoking status: Former Smoker     Types: Pipe     Quit date: 7/6/1983   • Smokeless tobacco: Never Used   • Alcohol use No      Comment: 1 glass wine/PM      History   Drug Use No       FAMILY HISTORY  Family History   Problem Relation Age of Onset   • Non-contributory Mother      very little history, no family history   • Cancer Mother      cancer of the jaw   • Cancer Maternal Grandmother 79     worked to death       CURRENT MEDICATIONS  No current facility-administered medications for this encounter.     Current Outpatient Prescriptions:   •  tamsulosin (FLOMAX) 0.4 MG capsule, Take 1 Cap by mouth every day., Disp: 90 Cap, Rfl: 2  •  levothyroxine (SYNTHROID) 175 MCG Tab, Take 1 Tab by mouth Every morning on an empty stomach., Disp: 90 Tab, Rfl: 3  •  carvedilol (COREG) 12.5 MG Tab, TAKE 1 TABLET BY MOUTH TWICE A DAY, Disp: 180 Tab, Rfl: 0  •  atorvastatin (LIPITOR) 20 MG Tab, Take 1 Tab by mouth every day., Disp: 90 Tab, Rfl: 2  •  terazosin (HYTRIN) 2 MG Cap, Take 1 Cap by mouth every day., Disp: 90 Cap, Rfl: 3  •  clopidogrel (PLAVIX) 75 MG Tab, Take 1 Tab by mouth every day., Disp: 90 Tab, Rfl: 3  •  RANEXA 500 MG TABLET SR 12 HR, Take 1 Tab by mouth 2 times a day., Disp: 60 Tab, Rfl: 6  •  Nutritional Supplements (BOOST HIGH PROTEIN) Liquid, Take 1 Bottle by mouth 2 Times a Day., Disp: 60 Can, Rfl: 11  •  ciprofloxacin (CIPRO) 500 MG Tab, Take 1 Tab by mouth every day., Disp: 5 Tab, Rfl: 0  •  amlodipine (NORVASC) 5 MG Tab, Take 5 mg by mouth every day., Disp: , Rfl:   •  irbesartan (AVAPRO) 150 MG Tab, Take 1 Tab by mouth every day., Disp: 30 Tab, Rfl: 6  •  cyanocobalamin (VITAMIN B12) 1000 MCG Tab, Take 1 Tab by mouth every day., Disp: 30 Tab, Rfl: 11  •  docusate sodium (COLACE) 100 MG Cap, Take 1 Cap by mouth every day., Disp: 30 Cap, Rfl: 6    ALLERGIES  Allergies   Allergen Reactions   • Colchicine  "Diarrhea and Vomiting     Diarrhea, n/v   • Ibuprofen        PHYSICAL EXAM  VITAL SIGNS: /43   Pulse (!) 53   Temp 36.7 °C (98.1 °F)   Resp 16   Ht 1.778 m (5' 10\")   Wt 80 kg (176 lb 5.9 oz)   BMI 25.31 kg/m²     Constitutional: Well developed, Well nourished, No distress, Non-toxic appearance.   HENT: Normocephalic, Atraumatic, Bilateral external ears normal, Oropharynx moist, No oral exudates.   Eyes: PERRLA, EOMI, Conjunctiva normal, No discharge.   Neck: No tenderness, Supple, No stridor.   Lymphatic: No lymphadenopathy noted.   Cardiovascular: Normal heart rate, Normal rhythm. Grade 4/6 holosystolic murmur.   Thorax & Lungs: Clear to auscultation bilaterally, No respiratory distress, No wheezing, No crackles. Large mid sternal scar.   Abdomen: Soft, No tenderness, No masses, No pulsatile masses.   Skin: Warm, Dry, No erythema, No rash.   Extremities:, No edema No cyanosis.   Musculoskeletal: No tenderness to palpation or major deformities noted.  Intact distal pulses  Neurologic: Awake, alert. Moves all extremities spontaneously. Confused. Oriented only to person.   Psychiatric: Affect normal, Judgment normal, Mood normal.     LABS  Labs Reviewed   COMP METABOLIC PANEL - Abnormal; Notable for the following:        Result Value    Potassium 5.8 (*)     Glucose 105 (*)     Bun 66 (*)     Creatinine 2.05 (*)     AST(SGOT) 11 (*)     Total Protein 5.6 (*)     All other components within normal limits   CBC WITH DIFFERENTIAL - Abnormal; Notable for the following:     WBC 4.5 (*)     RBC 3.54 (*)     Hemoglobin 11.3 (*)     Hematocrit 34.4 (*)     MCHC 32.8 (*)     RDW 51.0 (*)     Platelet Count 109 (*)     All other components within normal limits   BTYPE NATRIURETIC PEPTIDE - Abnormal; Notable for the following:     B Natriuretic Peptide 704 (*)     All other components within normal limits   ESTIMATED GFR - Abnormal; Notable for the following:     GFR If  38 (*)     GFR If Non  " American 31 (*)     All other components within normal limits   TROPONIN   TROPONIN   All labs reviewed by me.    EKG  EKG (ER)   Result Value Ref Range    Report       [facility] Emergency Dept.    Test Date:  2017  Pt Name:    JUMA ALEJANDRO                Department: ER  MRN:        [id number]                      Room:       [facility]  Gender:     M                            Technician: [id number]  :        [id number]                   Requested By:ER TRIAGE PROTOCOL  Order #:    [id number]                    Reading MD: GEORGES VELASQUEZ MD    Measurements  Intervals                                Axis  Rate:       46                           P:  IL:                                      QRS:        -52  QRSD:       122                          T:          107  QT:         476  QTc:        417    Interpretive Statements  NORMAL SINUS RHYTHM    LVH WITH IVCD, LAD AND SECONDARY REPOL ABNRM  Compared to ECG 2017 03:56:31  Junctional rhythm now present  Left ventricular hypertrophy now present  Early repolarization now present  Sinus bradycardia no longer present    Electronically Signed On 2017 12:14:20 PST by GEORGES VELASQUEZ MD            RADIOLOGY  DX-CHEST-PORTABLE (1 VIEW)   Final Result      Stable cardiomegaly      The radiologist's interpretation of all radiological studies have been reviewed by me.    COURSE & MEDICAL DECISION MAKING  Pertinent Labs & Imaging studies reviewed. (See chart for details)    I reviewed the patient's medical records which showed the patient has a history of MI, hypertension, atrial fibrillation. His last admission was in September for altered mental status secondary to UTI.  Patient has a history of multiple stens and aortic dissection.     12:15 PM - Patient seen and examined at bedside. Ordered DX chest, Troponin, CMP, CBC with differential, BNP to evaluate his symptoms. The differential diagnoses include but are not limited to:  dementia,Chest wall pain, ACS    Decision Making:  Patient with a history of acute coronary syndrome is very demented and difficult to get a accurate history on him, he is describing chest pain that is somewhat sharp however the patient does have chronic renal insufficiency therefore CTA could not be performed. The patient has seen cardiology in the past given his advanced age dementia chronic kidney disease and felt that no further procedures would be warranted. The patient was ruled out for acute coronary syndrome, the patient's symptoms spontaneously went away after the patient was given aspirin by ambulance. At this point, I do not see any indication of any thoracic aortic dissection or aneurysm, do not see any evidence of acute coronary syndrome, I believe the patient can be discharged home, have the patient return with worsening symptoms.     The patient will return for new or worsening symptoms and is stable at the time of discharge.    The patient is referred to a primary physician for blood pressure management, diabetic screening, and for all other preventative health concerns.    DISPOSITION:  Patient will be discharged home in stable condition.    FOLLOW UP:  [facility], Emergency Dept  [address] [id number] [phone]    If symptoms worsen    Andrew Basilio M.D.  [address]  [address]  [facility] [id number]  [phone]            OUTPATIENT MEDICATIONS:  New Prescriptions    No medications on file         FINAL IMPRESSION  1. Chest pain, unspecified type          I, Allan Brizuela (Scribriddhi), am scribing for, and in the presence of, Twin Cronin M.D..    Electronically signed by: Allan Brizuela (Scribe), 11/21/2017    ITwin M.D. personally performed the services described in this documentation, as scribed by Allan Brizuela in my presence, and it is both accurate and complete.    The note accurately reflects work and decisions made by me.   Twin Cronin  11/21/2017  6:05 PM

## 2017-11-21 NOTE — ED NOTES
Bib remsa, report pt c/o cp, nausea resolved by the time remsa on scene, multiple visits for same complaints, upon arrival pt denies complaints. Hx of dementia, lives at assisted living [facility]. Given 324 mg po asa pta

## 2017-11-22 NOTE — DISCHARGE PLANNING
SW Supervisor arranged for [employer] to transport pt back to facility. [employer] will be able to walk pt inside of facility to confirm he is safely returned to his facility. Bedside RN notified.

## 2017-11-22 NOTE — PROGRESS NOTES
11/22/2017 1115 - Discharge Outreach - patient discharged back to dementia facility. No call made.

## 2017-11-22 NOTE — DISCHARGE PLANNING
RACHNA called pt's residence, [facility] ([phone]), and let them know pt is medically clear to return. Sophia reported she will call  as soon as transport is arranged as their normal transport is out.     RACHNA called [facility] again and requested to know ETA of transport as SW was informed they would be sending a staff member in a cab to escort pt back, RACHNA informed by Sophia they will not be doing that now since their  is out. RACHNA requested to speak with .     RACHNA Supervisor spoke to  Renetta who reported they will not arrange transportation. SW Supervisor explained situation and that pt needs to be transported back to their facility and that it is unacceptable that they are requesting pt be sent in a cab alone as patient has dementia. Renetta to return call to supervisor once transport is arranged.      RACHNA Supervisor received return call from  Renetta who reported they will not transport & pt can either be sent via cab or [facility] needs to keep the pt. RACHNA Supervisor informed  that [facility] will be making a report against the facility and Renetta informed SW Supervisor she is fact not the  and is a nurse that was trying to help the first nurse that RACHNA spoke to. RACHNA Supervisor will be following up with state reports against facility tomorrow.

## 2017-11-22 NOTE — DISCHARGE INSTRUCTIONS
Please follow-up with your primary care provider for blood pressure management.      Chest Pain, Nonspecific  It is often hard to give a specific diagnosis for the cause of chest pain. There is always a chance that your pain could be related to something serious, like a heart attack or a blood clot in the lungs. You need to follow up with your caregiver for further evaluation. More lab tests or other studies such as X-rays, electrocardiography, stress testing, or cardiac imaging may be needed to find the cause of your pain.  Most of the time, nonspecific chest pain improves within 2 to 3 days with rest and mild pain medicine. For the next few days, avoid physical exertion or activities that bring on pain. Do not smoke. Avoid drinking alcohol. Call your caregiver for routine follow-up as advised.   SEEK IMMEDIATE MEDICAL CARE IF:  · You develop increased chest pain or pain that radiates to the arm, neck, jaw, back, or abdomen.   · You develop shortness of breath, increased coughing, or you start coughing up blood.   · You have severe back or abdominal pain, nausea, or vomiting.   · You develop severe weakness, fainting, fever, or chills.   Document Released: 12/18/2006 Document Revised: 03/11/2013 Document Reviewed: 06/06/2008  [employer]® Patient Information ©2013 [employer].

## 2017-12-19 PROBLEM — Z91.81 AT RISK FOR FALLS: Status: ACTIVE | Noted: 2017-01-01

## 2017-12-19 PROBLEM — E03.9 HYPOTHYROID: Status: RESOLVED | Noted: 2017-01-01 | Resolved: 2017-01-01

## 2017-12-19 PROBLEM — R29.898 BILATERAL LEG WEAKNESS: Status: ACTIVE | Noted: 2017-01-01
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
## 2018-01-01 ENCOUNTER — APPOINTMENT (OUTPATIENT)
Dept: RADIOLOGY | Facility: MEDICAL CENTER | Age: 83
DRG: 177 | End: 2018-01-01
Attending: EMERGENCY MEDICINE
Payer: MEDICARE

## 2018-01-01 ENCOUNTER — APPOINTMENT (OUTPATIENT)
Dept: RADIOLOGY | Facility: MEDICAL CENTER | Age: 83
DRG: 177 | End: 2018-01-01
Attending: INTERNAL MEDICINE
Payer: MEDICARE

## 2018-01-01 ENCOUNTER — HOME CARE VISIT (OUTPATIENT)
Dept: HOSPICE | Facility: HOSPICE | Age: 83
End: 2018-01-01
Payer: MEDICARE

## 2018-01-01 ENCOUNTER — PATIENT OUTREACH (OUTPATIENT)
Dept: HEALTH INFORMATION MANAGEMENT | Facility: OTHER | Age: 83
End: 2018-01-01

## 2018-01-01 ENCOUNTER — HOSPICE ADMISSION (OUTPATIENT)
Dept: HOSPICE | Facility: HOSPICE | Age: 83
End: 2018-01-01
Payer: MEDICARE

## 2018-01-01 ENCOUNTER — APPOINTMENT (OUTPATIENT)
Dept: RADIOLOGY | Facility: MEDICAL CENTER | Age: 83
DRG: 177 | End: 2018-01-01
Attending: HOSPITALIST
Payer: MEDICARE

## 2018-01-01 ENCOUNTER — RESOLUTE PROFESSIONAL BILLING HOSPITAL PROF FEE (OUTPATIENT)
Dept: HOSPITALIST | Facility: MEDICAL CENTER | Age: 83
End: 2018-01-01
Payer: MEDICARE

## 2018-01-01 ENCOUNTER — HOSPICE ADMISSION (OUTPATIENT)
Dept: HOSPICE | Facility: HOSPICE | Age: 83
End: 2018-01-01

## 2018-01-01 ENCOUNTER — HOSPITAL ENCOUNTER (OUTPATIENT)
Dept: LAB | Facility: MEDICAL CENTER | Age: 83
End: 2018-01-05
Attending: FAMILY MEDICINE
Payer: MEDICARE

## 2018-01-01 ENCOUNTER — HOSPITAL ENCOUNTER (INPATIENT)
Facility: MEDICAL CENTER | Age: 83
LOS: 4 days | DRG: 177 | End: 2018-01-27
Attending: EMERGENCY MEDICINE | Admitting: HOSPITALIST
Payer: MEDICARE

## 2018-01-01 VITALS
RESPIRATION RATE: 24 BRPM | HEART RATE: 79 BPM | DIASTOLIC BLOOD PRESSURE: 70 MMHG | SYSTOLIC BLOOD PRESSURE: 128 MMHG | OXYGEN SATURATION: 89 %

## 2018-01-01 VITALS
DIASTOLIC BLOOD PRESSURE: 60 MMHG | SYSTOLIC BLOOD PRESSURE: 100 MMHG | HEART RATE: 86 BPM | RESPIRATION RATE: 16 BRPM | OXYGEN SATURATION: 96 %

## 2018-01-01 VITALS — OXYGEN SATURATION: 94 % | RESPIRATION RATE: 20 BRPM | HEART RATE: 88 BPM

## 2018-01-01 VITALS
SYSTOLIC BLOOD PRESSURE: 113 MMHG | BODY MASS INDEX: 20.57 KG/M2 | WEIGHT: 151.9 LBS | TEMPERATURE: 97.8 F | DIASTOLIC BLOOD PRESSURE: 55 MMHG | RESPIRATION RATE: 18 BRPM | HEIGHT: 72 IN | OXYGEN SATURATION: 94 % | HEART RATE: 80 BPM

## 2018-01-01 VITALS — RESPIRATION RATE: 11 BRPM | HEART RATE: 64 BPM | OXYGEN SATURATION: 98 %

## 2018-01-01 DIAGNOSIS — E86.0 DEHYDRATION: ICD-10-CM

## 2018-01-01 DIAGNOSIS — R05.9 COUGH: ICD-10-CM

## 2018-01-01 DIAGNOSIS — K59.01 SLOW TRANSIT CONSTIPATION: ICD-10-CM

## 2018-01-01 DIAGNOSIS — R41.0 CONFUSION: ICD-10-CM

## 2018-01-01 DIAGNOSIS — J96.01 ACUTE RESPIRATORY FAILURE WITH HYPOXIA (HCC): ICD-10-CM

## 2018-01-01 DIAGNOSIS — E03.4 IDIOPATHIC ATROPHIC HYPOTHYROIDISM: ICD-10-CM

## 2018-01-01 LAB
25(OH)D3 SERPL-MCNC: 52 NG/ML (ref 30–100)
ALBUMIN SERPL BCP-MCNC: 2.7 G/DL (ref 3.2–4.9)
ALBUMIN SERPL BCP-MCNC: 3.1 G/DL (ref 3.2–4.9)
ALBUMIN SERPL BCP-MCNC: 3.2 G/DL (ref 3.2–4.9)
ALBUMIN SERPL BCP-MCNC: 3.3 G/DL (ref 3.2–4.9)
ALBUMIN SERPL BCP-MCNC: 3.6 G/DL (ref 3.2–4.9)
ALBUMIN/GLOB SERPL: 1.1 G/DL
ALBUMIN/GLOB SERPL: 1.1 G/DL
ALBUMIN/GLOB SERPL: 1.3 G/DL
ALBUMIN/GLOB SERPL: 1.5 G/DL
ALBUMIN/GLOB SERPL: 1.6 G/DL
ALP SERPL-CCNC: 42 U/L (ref 30–99)
ALP SERPL-CCNC: 43 U/L (ref 30–99)
ALP SERPL-CCNC: 53 U/L (ref 30–99)
ALP SERPL-CCNC: 55 U/L (ref 30–99)
ALP SERPL-CCNC: 58 U/L (ref 30–99)
ALT SERPL-CCNC: 10 U/L (ref 2–50)
ALT SERPL-CCNC: 11 U/L (ref 2–50)
ALT SERPL-CCNC: 12 U/L (ref 2–50)
ANION GAP SERPL CALC-SCNC: 10 MMOL/L (ref 0–11.9)
ANION GAP SERPL CALC-SCNC: 11 MMOL/L (ref 0–11.9)
ANION GAP SERPL CALC-SCNC: 12 MMOL/L (ref 0–11.9)
ANION GAP SERPL CALC-SCNC: 7 MMOL/L (ref 0–11.9)
ANION GAP SERPL CALC-SCNC: 9 MMOL/L (ref 0–11.9)
ANISOCYTOSIS BLD QL SMEAR: ABNORMAL
APPEARANCE UR: CLEAR
AST SERPL-CCNC: 13 U/L (ref 12–45)
AST SERPL-CCNC: 14 U/L (ref 12–45)
AST SERPL-CCNC: 15 U/L (ref 12–45)
AST SERPL-CCNC: 16 U/L (ref 12–45)
AST SERPL-CCNC: 16 U/L (ref 12–45)
BACTERIA #/AREA URNS HPF: NEGATIVE /HPF
BACTERIA BLD CULT: NORMAL
BACTERIA BLD CULT: NORMAL
BACTERIA UR CULT: NORMAL
BASOPHILS # BLD AUTO: 0.2 % (ref 0–1.8)
BASOPHILS # BLD AUTO: 0.2 % (ref 0–1.8)
BASOPHILS # BLD AUTO: 0.9 % (ref 0–1.8)
BASOPHILS # BLD: 0.01 K/UL (ref 0–0.12)
BASOPHILS # BLD: 0.01 K/UL (ref 0–0.12)
BASOPHILS # BLD: 0.05 K/UL (ref 0–0.12)
BILIRUB SERPL-MCNC: 1.2 MG/DL (ref 0.1–1.5)
BILIRUB SERPL-MCNC: 1.3 MG/DL (ref 0.1–1.5)
BILIRUB SERPL-MCNC: 1.5 MG/DL (ref 0.1–1.5)
BILIRUB SERPL-MCNC: 1.6 MG/DL (ref 0.1–1.5)
BILIRUB SERPL-MCNC: 2 MG/DL (ref 0.1–1.5)
BILIRUB UR QL STRIP.AUTO: NEGATIVE
BNP SERPL-MCNC: >5000 PG/ML (ref 0–100)
BUN SERPL-MCNC: 62 MG/DL (ref 8–22)
BUN SERPL-MCNC: 66 MG/DL (ref 8–22)
BUN SERPL-MCNC: 66 MG/DL (ref 8–22)
BUN SERPL-MCNC: 76 MG/DL (ref 8–22)
BUN SERPL-MCNC: 78 MG/DL (ref 8–22)
BURR CELLS BLD QL SMEAR: NORMAL
CALCIUM SERPL-MCNC: 8.9 MG/DL (ref 8.5–10.5)
CALCIUM SERPL-MCNC: 9 MG/DL (ref 8.5–10.5)
CALCIUM SERPL-MCNC: 9 MG/DL (ref 8.5–10.5)
CALCIUM SERPL-MCNC: 9.1 MG/DL (ref 8.5–10.5)
CALCIUM SERPL-MCNC: 9.4 MG/DL (ref 8.4–10.2)
CHLORIDE SERPL-SCNC: 108 MMOL/L (ref 96–112)
CHLORIDE SERPL-SCNC: 113 MMOL/L (ref 96–112)
CHLORIDE SERPL-SCNC: 117 MMOL/L (ref 96–112)
CHLORIDE SERPL-SCNC: 117 MMOL/L (ref 96–112)
CHLORIDE SERPL-SCNC: 119 MMOL/L (ref 96–112)
CHOLEST SERPL-MCNC: 98 MG/DL (ref 100–199)
CO2 SERPL-SCNC: 16 MMOL/L (ref 20–33)
CO2 SERPL-SCNC: 16 MMOL/L (ref 20–33)
CO2 SERPL-SCNC: 18 MMOL/L (ref 20–33)
CO2 SERPL-SCNC: 19 MMOL/L (ref 20–33)
CO2 SERPL-SCNC: 23 MMOL/L (ref 20–33)
COLOR UR: YELLOW
CREAT SERPL-MCNC: 1.86 MG/DL (ref 0.5–1.4)
CREAT SERPL-MCNC: 2.04 MG/DL (ref 0.5–1.4)
CREAT SERPL-MCNC: 2.22 MG/DL (ref 0.5–1.4)
CREAT SERPL-MCNC: 2.31 MG/DL (ref 0.5–1.4)
CREAT SERPL-MCNC: 2.39 MG/DL (ref 0.5–1.4)
EKG IMPRESSION: NORMAL
EOSINOPHIL # BLD AUTO: 0 K/UL (ref 0–0.51)
EOSINOPHIL NFR BLD: 0 % (ref 0–6.9)
EPI CELLS #/AREA URNS HPF: NEGATIVE /HPF
ERYTHROCYTE [DISTWIDTH] IN BLOOD BY AUTOMATED COUNT: 50.4 FL (ref 35.9–50)
ERYTHROCYTE [DISTWIDTH] IN BLOOD BY AUTOMATED COUNT: 51.3 FL (ref 35.9–50)
ERYTHROCYTE [DISTWIDTH] IN BLOOD BY AUTOMATED COUNT: 51.4 FL (ref 35.9–50)
ERYTHROCYTE [DISTWIDTH] IN BLOOD BY AUTOMATED COUNT: 51.8 FL (ref 35.9–50)
ERYTHROCYTE [DISTWIDTH] IN BLOOD BY AUTOMATED COUNT: 53 FL (ref 35.9–50)
FERRITIN SERPL-MCNC: 90.4 NG/ML (ref 22–322)
FLUAV RNA SPEC QL NAA+PROBE: NEGATIVE
FLUBV RNA SPEC QL NAA+PROBE: NEGATIVE
FOLATE SERPL-MCNC: 22.2 NG/ML
GLOBULIN SER CALC-MCNC: 2.2 G/DL (ref 1.9–3.5)
GLOBULIN SER CALC-MCNC: 2.3 G/DL (ref 1.9–3.5)
GLOBULIN SER CALC-MCNC: 2.4 G/DL (ref 1.9–3.5)
GLOBULIN SER CALC-MCNC: 2.5 G/DL (ref 1.9–3.5)
GLOBULIN SER CALC-MCNC: 2.7 G/DL (ref 1.9–3.5)
GLUCOSE SERPL-MCNC: 121 MG/DL (ref 65–99)
GLUCOSE SERPL-MCNC: 125 MG/DL (ref 65–99)
GLUCOSE SERPL-MCNC: 145 MG/DL (ref 65–99)
GLUCOSE SERPL-MCNC: 150 MG/DL (ref 65–99)
GLUCOSE SERPL-MCNC: 152 MG/DL (ref 65–99)
GLUCOSE UR STRIP.AUTO-MCNC: NEGATIVE MG/DL
HCT VFR BLD AUTO: 28.7 % (ref 42–52)
HCT VFR BLD AUTO: 29.6 % (ref 42–52)
HCT VFR BLD AUTO: 31.3 % (ref 42–52)
HCT VFR BLD AUTO: 33.4 % (ref 42–52)
HCT VFR BLD AUTO: 34.3 % (ref 42–52)
HDLC SERPL-MCNC: 32 MG/DL
HGB BLD-MCNC: 10.3 G/DL (ref 14–18)
HGB BLD-MCNC: 10.8 G/DL (ref 14–18)
HGB BLD-MCNC: 11.2 G/DL (ref 14–18)
HGB BLD-MCNC: 9.3 G/DL (ref 14–18)
HGB BLD-MCNC: 9.7 G/DL (ref 14–18)
HGB RETIC QN AUTO: 30.2 PG/CELL (ref 29–35)
HYALINE CASTS #/AREA URNS LPF: ABNORMAL /LPF
IMM GRANULOCYTES # BLD AUTO: 0.01 K/UL (ref 0–0.11)
IMM GRANULOCYTES NFR BLD AUTO: 0.2 % (ref 0–0.9)
IMM RETICS NFR: 15.1 % (ref 9.3–17.4)
INR PPP: 1.25 (ref 0.87–1.13)
INR PPP: 1.37 (ref 0.87–1.13)
IRON SATN MFR SERPL: ABNORMAL % (ref 15–55)
IRON SERPL-MCNC: <10 UG/DL (ref 50–180)
KETONES UR STRIP.AUTO-MCNC: NEGATIVE MG/DL
LACTATE BLD-SCNC: 1.9 MMOL/L (ref 0.5–2)
LACTATE BLD-SCNC: 2.1 MMOL/L (ref 0.5–2)
LDLC SERPL CALC-MCNC: 51 MG/DL
LEUKOCYTE ESTERASE UR QL STRIP.AUTO: ABNORMAL
LV EJECT FRACT  99904: 20
LYMPHOCYTES # BLD AUTO: 0.27 K/UL (ref 1–4.8)
LYMPHOCYTES # BLD AUTO: 0.47 K/UL (ref 1–4.8)
LYMPHOCYTES # BLD AUTO: 0.53 K/UL (ref 1–4.8)
LYMPHOCYTES NFR BLD: 10 % (ref 22–41)
LYMPHOCYTES NFR BLD: 11.2 % (ref 22–41)
LYMPHOCYTES NFR BLD: 5.2 % (ref 22–41)
MACROCYTES BLD QL SMEAR: ABNORMAL
MAGNESIUM SERPL-MCNC: 1.9 MG/DL (ref 1.5–2.5)
MAGNESIUM SERPL-MCNC: 2 MG/DL (ref 1.5–2.5)
MAGNESIUM SERPL-MCNC: 2 MG/DL (ref 1.5–2.5)
MANUAL DIFF BLD: NORMAL
MCH RBC QN AUTO: 32.1 PG (ref 27–33)
MCH RBC QN AUTO: 32.2 PG (ref 27–33)
MCH RBC QN AUTO: 32.4 PG (ref 27–33)
MCHC RBC AUTO-ENTMCNC: 32.3 G/DL (ref 33.7–35.3)
MCHC RBC AUTO-ENTMCNC: 32.4 G/DL (ref 33.7–35.3)
MCHC RBC AUTO-ENTMCNC: 32.7 G/DL (ref 33.7–35.3)
MCHC RBC AUTO-ENTMCNC: 32.8 G/DL (ref 33.7–35.3)
MCHC RBC AUTO-ENTMCNC: 32.9 G/DL (ref 33.7–35.3)
MCV RBC AUTO: 100 FL (ref 81.4–97.8)
MCV RBC AUTO: 98.4 FL (ref 81.4–97.8)
MCV RBC AUTO: 98.6 FL (ref 81.4–97.8)
MCV RBC AUTO: 99 FL (ref 81.4–97.8)
MCV RBC AUTO: 99.4 FL (ref 81.4–97.8)
MICRO URNS: ABNORMAL
MONOCYTES # BLD AUTO: 0.36 K/UL (ref 0–0.85)
MONOCYTES # BLD AUTO: 0.47 K/UL (ref 0–0.85)
MONOCYTES # BLD AUTO: 0.53 K/UL (ref 0–0.85)
MONOCYTES NFR BLD AUTO: 11.3 % (ref 0–13.4)
MONOCYTES NFR BLD AUTO: 6.9 % (ref 0–13.4)
MONOCYTES NFR BLD AUTO: 9.9 % (ref 0–13.4)
MORPHOLOGY BLD-IMP: NORMAL
NEUTROPHILS # BLD AUTO: 3.7 K/UL (ref 1.82–7.42)
NEUTROPHILS # BLD AUTO: 3.72 K/UL (ref 1.82–7.42)
NEUTROPHILS # BLD AUTO: 4.52 K/UL (ref 1.82–7.42)
NEUTROPHILS NFR BLD: 78.5 % (ref 44–72)
NEUTROPHILS NFR BLD: 78.5 % (ref 44–72)
NEUTROPHILS NFR BLD: 87 % (ref 44–72)
NITRITE UR QL STRIP.AUTO: NEGATIVE
NRBC # BLD AUTO: 0 K/UL
NRBC # BLD AUTO: 0 K/UL
NRBC BLD-RTO: 0 /100 WBC
NRBC BLD-RTO: 0 /100 WBC
OVALOCYTES BLD QL SMEAR: NORMAL
PH UR STRIP.AUTO: 5 [PH]
PHOSPHATE SERPL-MCNC: 2.9 MG/DL (ref 2.5–4.5)
PHOSPHATE SERPL-MCNC: 3.2 MG/DL (ref 2.5–4.5)
PHOSPHATE SERPL-MCNC: 3.8 MG/DL (ref 2.5–4.5)
PLATELET # BLD AUTO: 71 K/UL (ref 164–446)
PLATELET # BLD AUTO: 79 K/UL (ref 164–446)
PLATELET # BLD AUTO: 85 K/UL (ref 164–446)
PLATELET # BLD AUTO: 87 K/UL (ref 164–446)
PLATELET # BLD AUTO: 93 K/UL (ref 164–446)
PLATELET BLD QL SMEAR: NORMAL
PMV BLD AUTO: 11.9 FL (ref 9–12.9)
PMV BLD AUTO: 12.1 FL (ref 9–12.9)
PMV BLD AUTO: 12.4 FL (ref 9–12.9)
PMV BLD AUTO: 12.4 FL (ref 9–12.9)
PMV BLD AUTO: 12.6 FL (ref 9–12.9)
POIKILOCYTOSIS BLD QL SMEAR: NORMAL
POTASSIUM SERPL-SCNC: 4.6 MMOL/L (ref 3.6–5.5)
POTASSIUM SERPL-SCNC: 4.8 MMOL/L (ref 3.6–5.5)
POTASSIUM SERPL-SCNC: 5.3 MMOL/L (ref 3.6–5.5)
POTASSIUM SERPL-SCNC: 5.3 MMOL/L (ref 3.6–5.5)
POTASSIUM SERPL-SCNC: 5.7 MMOL/L (ref 3.6–5.5)
PROCALCITONIN SERPL-MCNC: 0.21 NG/ML
PROCALCITONIN SERPL-MCNC: 0.33 NG/ML
PROT SERPL-MCNC: 5.1 G/DL (ref 6–8.2)
PROT SERPL-MCNC: 5.5 G/DL (ref 6–8.2)
PROT SERPL-MCNC: 5.7 G/DL (ref 6–8.2)
PROT SERPL-MCNC: 5.8 G/DL (ref 6–8.2)
PROT SERPL-MCNC: 5.9 G/DL (ref 6–8.2)
PROT UR QL STRIP: NEGATIVE MG/DL
PROTHROMBIN TIME: 15.4 SEC (ref 12–14.6)
PROTHROMBIN TIME: 16.6 SEC (ref 12–14.6)
PTH-INTACT SERPL-MCNC: 103 PG/ML (ref 14–72)
RBC # BLD AUTO: 2.87 M/UL (ref 4.7–6.1)
RBC # BLD AUTO: 2.99 M/UL (ref 4.7–6.1)
RBC # BLD AUTO: 3.18 M/UL (ref 4.7–6.1)
RBC # BLD AUTO: 3.36 M/UL (ref 4.7–6.1)
RBC # BLD AUTO: 3.48 M/UL (ref 4.7–6.1)
RBC # URNS HPF: ABNORMAL /HPF
RBC BLD AUTO: PRESENT
RBC UR QL AUTO: NEGATIVE
RETICS # AUTO: 0.04 M/UL (ref 0.04–0.06)
RETICS/RBC NFR: 1.3 % (ref 0.8–2.1)
SIGNIFICANT IND 70042: NORMAL
SITE SITE: NORMAL
SODIUM SERPL-SCNC: 136 MMOL/L (ref 135–145)
SODIUM SERPL-SCNC: 140 MMOL/L (ref 135–145)
SODIUM SERPL-SCNC: 145 MMOL/L (ref 135–145)
SODIUM SERPL-SCNC: 147 MMOL/L (ref 135–145)
SODIUM SERPL-SCNC: 147 MMOL/L (ref 135–145)
SOURCE SOURCE: NORMAL
SP GR UR STRIP.AUTO: 1.02
TIBC SERPL-MCNC: 266 UG/DL (ref 250–450)
TRIGL SERPL-MCNC: 77 MG/DL (ref 0–149)
TROPONIN I SERPL-MCNC: 0.34 NG/ML (ref 0–0.04)
TROPONIN I SERPL-MCNC: 0.34 NG/ML (ref 0–0.04)
TROPONIN I SERPL-MCNC: 0.37 NG/ML (ref 0–0.04)
TROPONIN I SERPL-MCNC: 0.4 NG/ML (ref 0–0.04)
TROPONIN I SERPL-MCNC: 0.9 NG/ML (ref 0–0.04)
TSH SERPL DL<=0.005 MIU/L-ACNC: 11.22 UIU/ML (ref 0.38–5.33)
TSH SERPL DL<=0.005 MIU/L-ACNC: 17.03 UIU/ML (ref 0.38–5.33)
UROBILINOGEN UR STRIP.AUTO-MCNC: 0.2 MG/DL
VIT B12 SERPL-MCNC: 1073 PG/ML (ref 211–911)
WBC # BLD AUTO: 3.9 K/UL (ref 4.8–10.8)
WBC # BLD AUTO: 4.4 K/UL (ref 4.8–10.8)
WBC # BLD AUTO: 4.7 K/UL (ref 4.8–10.8)
WBC # BLD AUTO: 4.7 K/UL (ref 4.8–10.8)
WBC # BLD AUTO: 5.2 K/UL (ref 4.8–10.8)
WBC #/AREA URNS HPF: ABNORMAL /HPF

## 2018-01-01 PROCEDURE — S9126 HOSPICE CARE, IN THE HOME, P: HCPCS

## 2018-01-01 PROCEDURE — G8996 SWALLOW CURRENT STATUS: HCPCS | Mod: CL

## 2018-01-01 PROCEDURE — 80053 COMPREHEN METABOLIC PANEL: CPT

## 2018-01-01 PROCEDURE — 700111 HCHG RX REV CODE 636 W/ 250 OVERRIDE (IP): Performed by: INTERNAL MEDICINE

## 2018-01-01 PROCEDURE — 94669 MECHANICAL CHEST WALL OSCILL: CPT

## 2018-01-01 PROCEDURE — 85027 COMPLETE CBC AUTOMATED: CPT

## 2018-01-01 PROCEDURE — G0299 HHS/HOSPICE OF RN EA 15 MIN: HCPCS

## 2018-01-01 PROCEDURE — 700102 HCHG RX REV CODE 250 W/ 637 OVERRIDE(OP): Performed by: INTERNAL MEDICINE

## 2018-01-01 PROCEDURE — 97163 PT EVAL HIGH COMPLEX 45 MIN: CPT

## 2018-01-01 PROCEDURE — 700102 HCHG RX REV CODE 250 W/ 637 OVERRIDE(OP): Performed by: HOSPITALIST

## 2018-01-01 PROCEDURE — 83880 ASSAY OF NATRIURETIC PEPTIDE: CPT

## 2018-01-01 PROCEDURE — A9270 NON-COVERED ITEM OR SERVICE: HCPCS | Performed by: INTERNAL MEDICINE

## 2018-01-01 PROCEDURE — 700111 HCHG RX REV CODE 636 W/ 250 OVERRIDE (IP): Performed by: HOSPITALIST

## 2018-01-01 PROCEDURE — 700111 HCHG RX REV CODE 636 W/ 250 OVERRIDE (IP): Performed by: NURSE PRACTITIONER

## 2018-01-01 PROCEDURE — 94640 AIRWAY INHALATION TREATMENT: CPT

## 2018-01-01 PROCEDURE — 82306 VITAMIN D 25 HYDROXY: CPT

## 2018-01-01 PROCEDURE — 700105 HCHG RX REV CODE 258: Performed by: INTERNAL MEDICINE

## 2018-01-01 PROCEDURE — 71250 CT THORAX DX C-: CPT

## 2018-01-01 PROCEDURE — 97166 OT EVAL MOD COMPLEX 45 MIN: CPT

## 2018-01-01 PROCEDURE — 700102 HCHG RX REV CODE 250 W/ 637 OVERRIDE(OP): Performed by: NURSE PRACTITIONER

## 2018-01-01 PROCEDURE — 31720 CLEARANCE OF AIRWAYS: CPT

## 2018-01-01 PROCEDURE — 71045 X-RAY EXAM CHEST 1 VIEW: CPT

## 2018-01-01 PROCEDURE — 700105 HCHG RX REV CODE 258: Performed by: HOSPITALIST

## 2018-01-01 PROCEDURE — A6250 SKIN SEAL PROTECT MOISTURIZR: HCPCS

## 2018-01-01 PROCEDURE — 770020 HCHG ROOM/CARE - TELE (206)

## 2018-01-01 PROCEDURE — A9270 NON-COVERED ITEM OR SERVICE: HCPCS | Performed by: HOSPITALIST

## 2018-01-01 PROCEDURE — 82607 VITAMIN B-12: CPT

## 2018-01-01 PROCEDURE — 700102 HCHG RX REV CODE 250 W/ 637 OVERRIDE(OP): Performed by: EMERGENCY MEDICINE

## 2018-01-01 PROCEDURE — 84100 ASSAY OF PHOSPHORUS: CPT

## 2018-01-01 PROCEDURE — A9270 NON-COVERED ITEM OR SERVICE: HCPCS | Performed by: NURSE PRACTITIONER

## 2018-01-01 PROCEDURE — 80061 LIPID PANEL: CPT

## 2018-01-01 PROCEDURE — 36415 COLL VENOUS BLD VENIPUNCTURE: CPT

## 2018-01-01 PROCEDURE — 87502 INFLUENZA DNA AMP PROBE: CPT

## 2018-01-01 PROCEDURE — 83735 ASSAY OF MAGNESIUM: CPT

## 2018-01-01 PROCEDURE — 83540 ASSAY OF IRON: CPT

## 2018-01-01 PROCEDURE — 93970 EXTREMITY STUDY: CPT

## 2018-01-01 PROCEDURE — 84145 PROCALCITONIN (PCT): CPT

## 2018-01-01 PROCEDURE — 99498 ADVNCD CARE PLAN ADDL 30 MIN: CPT | Performed by: INTERNAL MEDICINE

## 2018-01-01 PROCEDURE — 93005 ELECTROCARDIOGRAM TRACING: CPT | Performed by: EMERGENCY MEDICINE

## 2018-01-01 PROCEDURE — G8979 MOBILITY GOAL STATUS: HCPCS | Mod: CI

## 2018-01-01 PROCEDURE — 81001 URINALYSIS AUTO W/SCOPE: CPT

## 2018-01-01 PROCEDURE — 94760 N-INVAS EAR/PLS OXIMETRY 1: CPT

## 2018-01-01 PROCEDURE — 99233 SBSQ HOSP IP/OBS HIGH 50: CPT | Performed by: INTERNAL MEDICINE

## 2018-01-01 PROCEDURE — 700101 HCHG RX REV CODE 250: Performed by: INTERNAL MEDICINE

## 2018-01-01 PROCEDURE — 83970 ASSAY OF PARATHORMONE: CPT

## 2018-01-01 PROCEDURE — 6650387 HCR  NO RINSE SHAMPOO

## 2018-01-01 PROCEDURE — G8988 SELF CARE GOAL STATUS: HCPCS | Mod: CJ

## 2018-01-01 PROCEDURE — 84484 ASSAY OF TROPONIN QUANT: CPT

## 2018-01-01 PROCEDURE — A4520 INCONTINENCE GARMENT ANYTYPE: HCPCS

## 2018-01-01 PROCEDURE — 99285 EMERGENCY DEPT VISIT HI MDM: CPT

## 2018-01-01 PROCEDURE — A9270 NON-COVERED ITEM OR SERVICE: HCPCS | Performed by: EMERGENCY MEDICINE

## 2018-01-01 PROCEDURE — 85025 COMPLETE CBC W/AUTO DIFF WBC: CPT

## 2018-01-01 PROCEDURE — 70450 CT HEAD/BRAIN W/O DYE: CPT

## 2018-01-01 PROCEDURE — A4554 DISPOSABLE UNDERPADS: HCPCS

## 2018-01-01 PROCEDURE — 92610 EVALUATE SWALLOWING FUNCTION: CPT

## 2018-01-01 PROCEDURE — 92526 ORAL FUNCTION THERAPY: CPT

## 2018-01-01 PROCEDURE — 85007 BL SMEAR W/DIFF WBC COUNT: CPT

## 2018-01-01 PROCEDURE — 87040 BLOOD CULTURE FOR BACTERIA: CPT

## 2018-01-01 PROCEDURE — 82728 ASSAY OF FERRITIN: CPT

## 2018-01-01 PROCEDURE — 96375 TX/PRO/DX INJ NEW DRUG ADDON: CPT

## 2018-01-01 PROCEDURE — 665036 HSPC NOTICE OF ELECTION NOE

## 2018-01-01 PROCEDURE — 84443 ASSAY THYROID STIM HORMONE: CPT

## 2018-01-01 PROCEDURE — 82746 ASSAY OF FOLIC ACID SERUM: CPT

## 2018-01-01 PROCEDURE — 93306 TTE W/DOPPLER COMPLETE: CPT

## 2018-01-01 PROCEDURE — 93306 TTE W/DOPPLER COMPLETE: CPT | Mod: 26 | Performed by: INTERNAL MEDICINE

## 2018-01-01 PROCEDURE — G0155 HHCP-SVS OF CSW,EA 15 MIN: HCPCS

## 2018-01-01 PROCEDURE — 85610 PROTHROMBIN TIME: CPT

## 2018-01-01 PROCEDURE — 84484 ASSAY OF TROPONIN QUANT: CPT | Mod: 91

## 2018-01-01 PROCEDURE — 85046 RETICYTE/HGB CONCENTRATE: CPT

## 2018-01-01 PROCEDURE — 96361 HYDRATE IV INFUSION ADD-ON: CPT

## 2018-01-01 PROCEDURE — 99223 1ST HOSP IP/OBS HIGH 75: CPT | Mod: AI | Performed by: HOSPITALIST

## 2018-01-01 PROCEDURE — 83550 IRON BINDING TEST: CPT

## 2018-01-01 PROCEDURE — 700101 HCHG RX REV CODE 250

## 2018-01-01 PROCEDURE — G0156 HHCP-SVS OF AIDE,EA 15 MIN: HCPCS

## 2018-01-01 PROCEDURE — 94668 MNPJ CHEST WALL SBSQ: CPT

## 2018-01-01 PROCEDURE — 99239 HOSP IP/OBS DSCHRG MGMT >30: CPT | Performed by: INTERNAL MEDICINE

## 2018-01-01 PROCEDURE — 94667 MNPJ CHEST WALL 1ST: CPT

## 2018-01-01 PROCEDURE — G8997 SWALLOW GOAL STATUS: HCPCS | Mod: CI

## 2018-01-01 PROCEDURE — 99497 ADVNCD CARE PLAN 30 MIN: CPT | Performed by: INTERNAL MEDICINE

## 2018-01-01 PROCEDURE — 700105 HCHG RX REV CODE 258: Performed by: EMERGENCY MEDICINE

## 2018-01-01 PROCEDURE — 83605 ASSAY OF LACTIC ACID: CPT | Mod: 91

## 2018-01-01 PROCEDURE — 96365 THER/PROPH/DIAG IV INF INIT: CPT

## 2018-01-01 PROCEDURE — G8987 SELF CARE CURRENT STATUS: HCPCS | Mod: CM

## 2018-01-01 PROCEDURE — G8978 MOBILITY CURRENT STATUS: HCPCS | Mod: CL

## 2018-01-01 PROCEDURE — 700111 HCHG RX REV CODE 636 W/ 250 OVERRIDE (IP): Performed by: EMERGENCY MEDICINE

## 2018-01-01 PROCEDURE — 87086 URINE CULTURE/COLONY COUNT: CPT

## 2018-01-01 RX ORDER — IPRATROPIUM BROMIDE AND ALBUTEROL SULFATE 2.5; .5 MG/3ML; MG/3ML
3 SOLUTION RESPIRATORY (INHALATION)
Status: DISCONTINUED | OUTPATIENT
Start: 2018-01-01 | End: 2018-01-01

## 2018-01-01 RX ORDER — SODIUM CHLORIDE 9 MG/ML
INJECTION, SOLUTION INTRAVENOUS CONTINUOUS
Status: DISCONTINUED | OUTPATIENT
Start: 2018-01-01 | End: 2018-01-01

## 2018-01-01 RX ORDER — DOCUSATE SODIUM 100 MG/1
100 CAPSULE, LIQUID FILLED ORAL DAILY
Status: DISCONTINUED | OUTPATIENT
Start: 2018-01-01 | End: 2018-01-01

## 2018-01-01 RX ORDER — POLYETHYLENE GLYCOL 3350 17 G/17G
1 POWDER, FOR SOLUTION ORAL
Status: DISCONTINUED | OUTPATIENT
Start: 2018-01-01 | End: 2018-01-01

## 2018-01-01 RX ORDER — MORPHINE SULFATE 10 MG/ML
10 INJECTION, SOLUTION INTRAMUSCULAR; INTRAVENOUS
Status: DISCONTINUED | OUTPATIENT
Start: 2018-01-01 | End: 2018-01-01 | Stop reason: HOSPADM

## 2018-01-01 RX ORDER — ONDANSETRON 2 MG/ML
8 INJECTION INTRAMUSCULAR; INTRAVENOUS EVERY 8 HOURS PRN
Status: DISCONTINUED | OUTPATIENT
Start: 2018-01-01 | End: 2018-01-01 | Stop reason: HOSPADM

## 2018-01-01 RX ORDER — IRBESARTAN 150 MG/1
300 TABLET ORAL DAILY
Status: DISCONTINUED | OUTPATIENT
Start: 2018-01-01 | End: 2018-01-01

## 2018-01-01 RX ORDER — BISACODYL 10 MG
10 SUPPOSITORY, RECTAL RECTAL
Status: DISCONTINUED | OUTPATIENT
Start: 2018-01-01 | End: 2018-01-01

## 2018-01-01 RX ORDER — IRBESARTAN 150 MG/1
150 TABLET ORAL DAILY
Status: DISCONTINUED | OUTPATIENT
Start: 2018-01-01 | End: 2018-01-01

## 2018-01-01 RX ORDER — SODIUM CHLORIDE FOR INHALATION 3 %
3 VIAL, NEBULIZER (ML) INHALATION
Status: DISCONTINUED | OUTPATIENT
Start: 2018-01-01 | End: 2018-01-01

## 2018-01-01 RX ORDER — AZITHROMYCIN 250 MG/1
500 TABLET, FILM COATED ORAL ONCE
Status: DISCONTINUED | OUTPATIENT
Start: 2018-01-01 | End: 2018-01-01

## 2018-01-01 RX ORDER — HALOPERIDOL 5 MG/ML
2-5 INJECTION INTRAMUSCULAR EVERY 4 HOURS PRN
Status: DISCONTINUED | OUTPATIENT
Start: 2018-01-01 | End: 2018-01-01

## 2018-01-01 RX ORDER — LEVOTHYROXINE SODIUM 0.1 MG/1
200 TABLET ORAL
Status: DISCONTINUED | OUTPATIENT
Start: 2018-01-01 | End: 2018-01-01

## 2018-01-01 RX ORDER — AZITHROMYCIN 250 MG/1
250 TABLET, FILM COATED ORAL DAILY
Status: DISCONTINUED | OUTPATIENT
Start: 2018-01-01 | End: 2018-01-01

## 2018-01-01 RX ORDER — DEXTROSE MONOHYDRATE 50 MG/ML
INJECTION, SOLUTION INTRAVENOUS CONTINUOUS
Status: DISCONTINUED | OUTPATIENT
Start: 2018-01-01 | End: 2018-01-01

## 2018-01-01 RX ORDER — FUROSEMIDE 10 MG/ML
40 INJECTION INTRAMUSCULAR; INTRAVENOUS
Status: DISCONTINUED | OUTPATIENT
Start: 2018-01-01 | End: 2018-01-01

## 2018-01-01 RX ORDER — AMLODIPINE BESYLATE 5 MG/1
TABLET ORAL
Qty: 30 TAB | Refills: 0 | Status: ON HOLD | OUTPATIENT
Start: 2018-01-01 | End: 2018-01-01

## 2018-01-01 RX ORDER — HYDRALAZINE HYDROCHLORIDE 25 MG/1
25 TABLET, FILM COATED ORAL EVERY 8 HOURS
Status: DISCONTINUED | OUTPATIENT
Start: 2018-01-01 | End: 2018-01-01

## 2018-01-01 RX ORDER — HYDRALAZINE HYDROCHLORIDE 10 MG/1
10 TABLET, FILM COATED ORAL EVERY 8 HOURS
Status: DISCONTINUED | OUTPATIENT
Start: 2018-01-01 | End: 2018-01-01

## 2018-01-01 RX ORDER — FUROSEMIDE 10 MG/ML
20 INJECTION INTRAMUSCULAR; INTRAVENOUS
Status: DISCONTINUED | OUTPATIENT
Start: 2018-01-01 | End: 2018-01-01

## 2018-01-01 RX ORDER — ACETAMINOPHEN 325 MG/1
650 TABLET ORAL EVERY 6 HOURS PRN
Status: DISCONTINUED | OUTPATIENT
Start: 2018-01-01 | End: 2018-01-01

## 2018-01-01 RX ORDER — ISOSORBIDE DINITRATE 10 MG/1
20 TABLET ORAL 3 TIMES DAILY
Status: DISCONTINUED | OUTPATIENT
Start: 2018-01-01 | End: 2018-01-01

## 2018-01-01 RX ORDER — CLOPIDOGREL BISULFATE 75 MG/1
75 TABLET ORAL DAILY
Status: DISCONTINUED | OUTPATIENT
Start: 2018-01-01 | End: 2018-01-01

## 2018-01-01 RX ORDER — DOCUSATE SODIUM 50 MG/5ML
100 LIQUID ORAL DAILY
Status: DISCONTINUED | OUTPATIENT
Start: 2018-01-01 | End: 2018-01-01

## 2018-01-01 RX ORDER — ACETYLCYSTEINE 100 MG/ML
4 SOLUTION ORAL; RESPIRATORY (INHALATION)
Status: DISCONTINUED | OUTPATIENT
Start: 2018-01-01 | End: 2018-01-01

## 2018-01-01 RX ORDER — CARVEDILOL 12.5 MG/1
12.5 TABLET ORAL 2 TIMES DAILY WITH MEALS
Status: DISCONTINUED | OUTPATIENT
Start: 2018-01-01 | End: 2018-01-01

## 2018-01-01 RX ORDER — TERAZOSIN 2 MG/1
2 CAPSULE ORAL DAILY
Status: DISCONTINUED | OUTPATIENT
Start: 2018-01-01 | End: 2018-01-01

## 2018-01-01 RX ORDER — ATROPINE SULFATE 10 MG/ML
2 SOLUTION/ DROPS OPHTHALMIC EVERY 4 HOURS PRN
Status: DISCONTINUED | OUTPATIENT
Start: 2018-01-01 | End: 2018-01-01 | Stop reason: HOSPADM

## 2018-01-01 RX ORDER — ATORVASTATIN CALCIUM 20 MG/1
20 TABLET, FILM COATED ORAL DAILY
Status: DISCONTINUED | OUTPATIENT
Start: 2018-01-01 | End: 2018-01-01

## 2018-01-01 RX ORDER — AMLODIPINE BESYLATE 5 MG/1
5 TABLET ORAL
Status: DISCONTINUED | OUTPATIENT
Start: 2018-01-01 | End: 2018-01-01

## 2018-01-01 RX ORDER — ASPIRIN 325 MG
325 TABLET ORAL DAILY
Status: DISCONTINUED | OUTPATIENT
Start: 2018-01-01 | End: 2018-01-01

## 2018-01-01 RX ORDER — LORAZEPAM 2 MG/ML
1 INJECTION INTRAMUSCULAR
Status: DISCONTINUED | OUTPATIENT
Start: 2018-01-01 | End: 2018-01-01 | Stop reason: HOSPADM

## 2018-01-01 RX ORDER — MORPHINE SULFATE 100 MG/5ML
10 SOLUTION ORAL
Status: DISCONTINUED | OUTPATIENT
Start: 2018-01-01 | End: 2018-01-01 | Stop reason: HOSPADM

## 2018-01-01 RX ORDER — CEFTRIAXONE 1 G/1
1 INJECTION, POWDER, FOR SOLUTION INTRAMUSCULAR; INTRAVENOUS ONCE
Status: COMPLETED | OUTPATIENT
Start: 2018-01-01 | End: 2018-01-01

## 2018-01-01 RX ORDER — CARVEDILOL 25 MG/1
25 TABLET ORAL 2 TIMES DAILY WITH MEALS
Status: DISCONTINUED | OUTPATIENT
Start: 2018-01-01 | End: 2018-01-01

## 2018-01-01 RX ORDER — DOCUSATE SODIUM 100 MG/1
100 CAPSULE, LIQUID FILLED ORAL DAILY
Qty: 30 CAP | Refills: 6 | Status: ON HOLD | OUTPATIENT
Start: 2018-01-01 | End: 2018-01-01

## 2018-01-01 RX ORDER — MAGNESIUM SULFATE HEPTAHYDRATE 40 MG/ML
2 INJECTION, SOLUTION INTRAVENOUS ONCE
Status: DISCONTINUED | OUTPATIENT
Start: 2018-01-01 | End: 2018-01-01

## 2018-01-01 RX ORDER — ASPIRIN 300 MG/1
600 SUPPOSITORY RECTAL ONCE
Status: COMPLETED | OUTPATIENT
Start: 2018-01-01 | End: 2018-01-01

## 2018-01-01 RX ORDER — TAMSULOSIN HYDROCHLORIDE 0.4 MG/1
0.4 CAPSULE ORAL DAILY
Status: DISCONTINUED | OUTPATIENT
Start: 2018-01-01 | End: 2018-01-01

## 2018-01-01 RX ORDER — ASPIRIN 81 MG/1
81 TABLET, CHEWABLE ORAL DAILY
Status: DISCONTINUED | OUTPATIENT
Start: 2018-01-01 | End: 2018-01-01

## 2018-01-01 RX ORDER — FAMOTIDINE 20 MG/1
20 TABLET, FILM COATED ORAL DAILY
Status: DISCONTINUED | OUTPATIENT
Start: 2018-01-01 | End: 2018-01-01 | Stop reason: HOSPADM

## 2018-01-01 RX ORDER — ONDANSETRON 4 MG/1
8 TABLET, ORALLY DISINTEGRATING ORAL EVERY 8 HOURS PRN
Status: DISCONTINUED | OUTPATIENT
Start: 2018-01-01 | End: 2018-01-01 | Stop reason: HOSPADM

## 2018-01-01 RX ORDER — GLYCOPYRROLATE 1 MG/1
1 TABLET ORAL 3 TIMES DAILY PRN
Status: DISCONTINUED | OUTPATIENT
Start: 2018-01-01 | End: 2018-01-01 | Stop reason: HOSPADM

## 2018-01-01 RX ORDER — MORPHINE SULFATE 10 MG/ML
5 INJECTION, SOLUTION INTRAMUSCULAR; INTRAVENOUS
Status: DISCONTINUED | OUTPATIENT
Start: 2018-01-01 | End: 2018-01-01 | Stop reason: HOSPADM

## 2018-01-01 RX ORDER — DOCUSATE SODIUM 100 MG/1
100 CAPSULE, LIQUID FILLED ORAL EVERY 12 HOURS
Status: DISCONTINUED | OUTPATIENT
Start: 2018-01-01 | End: 2018-01-01 | Stop reason: HOSPADM

## 2018-01-01 RX ORDER — HEPARIN SODIUM 5000 [USP'U]/ML
5000 INJECTION, SOLUTION INTRAVENOUS; SUBCUTANEOUS EVERY 8 HOURS
Status: DISCONTINUED | OUTPATIENT
Start: 2018-01-01 | End: 2018-01-01

## 2018-01-01 RX ORDER — CHOLECALCIFEROL (VITAMIN D3) 125 MCG
1000 CAPSULE ORAL DAILY
Status: DISCONTINUED | OUTPATIENT
Start: 2018-01-01 | End: 2018-01-01

## 2018-01-01 RX ORDER — ACETYLCYSTEINE 100 MG/ML
4 SOLUTION ORAL; RESPIRATORY (INHALATION) 4 TIMES DAILY
Status: DISCONTINUED | OUTPATIENT
Start: 2018-01-01 | End: 2018-01-01

## 2018-01-01 RX ORDER — AMOXICILLIN 250 MG
2 CAPSULE ORAL 2 TIMES DAILY
Status: DISCONTINUED | OUTPATIENT
Start: 2018-01-01 | End: 2018-01-01

## 2018-01-01 RX ORDER — FUROSEMIDE 10 MG/ML
30 INJECTION INTRAMUSCULAR; INTRAVENOUS
Status: DISCONTINUED | OUTPATIENT
Start: 2018-01-01 | End: 2018-01-01

## 2018-01-01 RX ORDER — RANOLAZINE 500 MG/1
500 TABLET, EXTENDED RELEASE ORAL 2 TIMES DAILY
Status: DISCONTINUED | OUTPATIENT
Start: 2018-01-01 | End: 2018-01-01

## 2018-01-01 RX ORDER — LORAZEPAM 2 MG/ML
1 CONCENTRATE ORAL
Status: DISCONTINUED | OUTPATIENT
Start: 2018-01-01 | End: 2018-01-01 | Stop reason: HOSPADM

## 2018-01-01 RX ORDER — GUAIFENESIN/DEXTROMETHORPHAN 100-10MG/5
10 SYRUP ORAL EVERY 6 HOURS PRN
Status: DISCONTINUED | OUTPATIENT
Start: 2018-01-01 | End: 2018-01-01

## 2018-01-01 RX ORDER — POLYVINYL ALCOHOL 14 MG/ML
2 SOLUTION/ DROPS OPHTHALMIC EVERY 6 HOURS PRN
Status: DISCONTINUED | OUTPATIENT
Start: 2018-01-01 | End: 2018-01-01 | Stop reason: HOSPADM

## 2018-01-01 RX ORDER — ISOSORBIDE DINITRATE 10 MG/1
10 TABLET ORAL 3 TIMES DAILY
Status: DISCONTINUED | OUTPATIENT
Start: 2018-01-01 | End: 2018-01-01

## 2018-01-01 RX ORDER — SODIUM CHLORIDE 9 MG/ML
30 INJECTION, SOLUTION INTRAVENOUS ONCE
Status: COMPLETED | OUTPATIENT
Start: 2018-01-01 | End: 2018-01-01

## 2018-01-01 RX ORDER — LEVOTHYROXINE SODIUM 0.03 MG/1
175 TABLET ORAL
Status: DISCONTINUED | OUTPATIENT
Start: 2018-01-01 | End: 2018-01-01

## 2018-01-01 RX ADMIN — HYDRALAZINE HYDROCHLORIDE 25 MG: 25 TABLET, FILM COATED ORAL at 06:26

## 2018-01-01 RX ADMIN — FUROSEMIDE 40 MG: 10 INJECTION, SOLUTION INTRAMUSCULAR; INTRAVENOUS at 15:33

## 2018-01-01 RX ADMIN — HEPARIN SODIUM 5000 UNITS: 5000 INJECTION, SOLUTION INTRAVENOUS; SUBCUTANEOUS at 14:15

## 2018-01-01 RX ADMIN — CARVEDILOL 25 MG: 25 TABLET, FILM COATED ORAL at 17:05

## 2018-01-01 RX ADMIN — IPRATROPIUM BROMIDE AND ALBUTEROL SULFATE 3 ML: .5; 3 SOLUTION RESPIRATORY (INHALATION) at 10:33

## 2018-01-01 RX ADMIN — SODIUM CHLORIDE SOLN NEBU 3% 3 ML: 3 NEBU SOLN at 14:35

## 2018-01-01 RX ADMIN — CEFTRIAXONE 1 G: 1 INJECTION, POWDER, FOR SOLUTION INTRAMUSCULAR; INTRAVENOUS at 12:18

## 2018-01-01 RX ADMIN — DEXTROSE MONOHYDRATE: 50 INJECTION, SOLUTION INTRAVENOUS at 15:15

## 2018-01-01 RX ADMIN — HEPARIN SODIUM 5000 UNITS: 5000 INJECTION, SOLUTION INTRAVENOUS; SUBCUTANEOUS at 06:26

## 2018-01-01 RX ADMIN — HEPARIN SODIUM 5000 UNITS: 5000 INJECTION, SOLUTION INTRAVENOUS; SUBCUTANEOUS at 05:59

## 2018-01-01 RX ADMIN — IPRATROPIUM BROMIDE AND ALBUTEROL SULFATE 3 ML: .5; 3 SOLUTION RESPIRATORY (INHALATION) at 06:26

## 2018-01-01 RX ADMIN — SODIUM CHLORIDE SOLN NEBU 3% 3 ML: 3 NEBU SOLN at 10:33

## 2018-01-01 RX ADMIN — RANOLAZINE 500 MG: 500 TABLET, FILM COATED, EXTENDED RELEASE ORAL at 22:03

## 2018-01-01 RX ADMIN — CARVEDILOL 25 MG: 25 TABLET, FILM COATED ORAL at 11:33

## 2018-01-01 RX ADMIN — HALOPERIDOL 1 MG: 5 INJECTION INTRAMUSCULAR at 12:40

## 2018-01-01 RX ADMIN — ACETYLCYSTEINE 4 ML: 100 INHALANT RESPIRATORY (INHALATION) at 06:26

## 2018-01-01 RX ADMIN — HYDRALAZINE HYDROCHLORIDE 25 MG: 25 TABLET, FILM COATED ORAL at 06:25

## 2018-01-01 RX ADMIN — ISOSORBIDE DINITRATE 20 MG: 10 TABLET ORAL at 22:54

## 2018-01-01 RX ADMIN — IPRATROPIUM BROMIDE AND ALBUTEROL SULFATE 3 ML: .5; 3 SOLUTION RESPIRATORY (INHALATION) at 11:13

## 2018-01-01 RX ADMIN — HEPARIN SODIUM 5000 UNITS: 5000 INJECTION, SOLUTION INTRAVENOUS; SUBCUTANEOUS at 21:07

## 2018-01-01 RX ADMIN — ACETYLCYSTEINE 4 ML: 100 INHALANT RESPIRATORY (INHALATION) at 10:33

## 2018-01-01 RX ADMIN — TERAZOSIN HYDROCHLORIDE ANHYDROUS 2 MG: 2 CAPSULE ORAL at 08:26

## 2018-01-01 RX ADMIN — ISOSORBIDE DINITRATE 20 MG: 10 TABLET ORAL at 11:34

## 2018-01-01 RX ADMIN — TERAZOSIN HYDROCHLORIDE ANHYDROUS 2 MG: 2 CAPSULE ORAL at 11:29

## 2018-01-01 RX ADMIN — FUROSEMIDE 20 MG: 10 INJECTION, SOLUTION INTRAMUSCULAR; INTRAVENOUS at 12:21

## 2018-01-01 RX ADMIN — HYDRALAZINE HYDROCHLORIDE 25 MG: 25 TABLET, FILM COATED ORAL at 05:52

## 2018-01-01 RX ADMIN — SODIUM CHLORIDE SOLN NEBU 3% 3 ML: 3 NEBU SOLN at 18:33

## 2018-01-01 RX ADMIN — SODIUM CHLORIDE SOLN NEBU 3% 3 ML: 3 NEBU SOLN at 06:33

## 2018-01-01 RX ADMIN — FUROSEMIDE 40 MG: 10 INJECTION, SOLUTION INTRAMUSCULAR; INTRAVENOUS at 06:25

## 2018-01-01 RX ADMIN — AZITHROMYCIN FOR INJECTION INJECTION, POWDER, LYOPHILIZED, FOR SOLUTION 500 MG: 500 INJECTION INTRAVENOUS at 10:40

## 2018-01-01 RX ADMIN — ISOSORBIDE DINITRATE 20 MG: 10 TABLET ORAL at 08:27

## 2018-01-01 RX ADMIN — ATORVASTATIN CALCIUM 20 MG: 20 TABLET, FILM COATED ORAL at 11:29

## 2018-01-01 RX ADMIN — HEPARIN SODIUM 5000 UNITS: 5000 INJECTION, SOLUTION INTRAVENOUS; SUBCUTANEOUS at 22:05

## 2018-01-01 RX ADMIN — AMPICILLIN SODIUM AND SULBACTAM SODIUM 3 G: 2; 1 INJECTION, POWDER, FOR SOLUTION INTRAMUSCULAR; INTRAVENOUS at 17:01

## 2018-01-01 RX ADMIN — AMPICILLIN SODIUM AND SULBACTAM SODIUM: 100; 50 INJECTION, POWDER, FOR SOLUTION INTRAVENOUS at 17:30

## 2018-01-01 RX ADMIN — ISOSORBIDE DINITRATE 20 MG: 10 TABLET ORAL at 20:46

## 2018-01-01 RX ADMIN — ISOSORBIDE DINITRATE 20 MG: 10 TABLET ORAL at 16:53

## 2018-01-01 RX ADMIN — IPRATROPIUM BROMIDE AND ALBUTEROL SULFATE 3 ML: .5; 3 SOLUTION RESPIRATORY (INHALATION) at 01:10

## 2018-01-01 RX ADMIN — SODIUM CHLORIDE SOLN NEBU 3% 3 ML: 3 NEBU SOLN at 18:28

## 2018-01-01 RX ADMIN — ACETYLCYSTEINE 4 ML: 100 INHALANT RESPIRATORY (INHALATION) at 06:33

## 2018-01-01 RX ADMIN — AMPICILLIN SODIUM AND SULBACTAM SODIUM: 100; 50 INJECTION, POWDER, FOR SOLUTION INTRAVENOUS at 17:06

## 2018-01-01 RX ADMIN — CYANOCOBALAMIN TAB 500 MCG 1000 MCG: 500 TAB at 08:27

## 2018-01-01 RX ADMIN — IRBESARTAN 300 MG: 150 TABLET ORAL at 08:26

## 2018-01-01 RX ADMIN — HYDRALAZINE HYDROCHLORIDE 25 MG: 25 TABLET, FILM COATED ORAL at 22:01

## 2018-01-01 RX ADMIN — IPRATROPIUM BROMIDE AND ALBUTEROL SULFATE 3 ML: .5; 3 SOLUTION RESPIRATORY (INHALATION) at 14:33

## 2018-01-01 RX ADMIN — IPRATROPIUM BROMIDE AND ALBUTEROL SULFATE 3 ML: .5; 3 SOLUTION RESPIRATORY (INHALATION) at 18:28

## 2018-01-01 RX ADMIN — HYDRALAZINE HYDROCHLORIDE 25 MG: 25 TABLET, FILM COATED ORAL at 14:14

## 2018-01-01 RX ADMIN — CYANOCOBALAMIN TAB 500 MCG 1000 MCG: 500 TAB at 11:29

## 2018-01-01 RX ADMIN — ACETYLCYSTEINE 4 ML: 100 INHALANT RESPIRATORY (INHALATION) at 14:22

## 2018-01-01 RX ADMIN — SODIUM CHLORIDE SOLN NEBU 3% 3 ML: 3 NEBU SOLN at 06:17

## 2018-01-01 RX ADMIN — SODIUM CHLORIDE SOLN NEBU 3% 3 ML: 3 NEBU SOLN at 10:03

## 2018-01-01 RX ADMIN — LEVOTHYROXINE SODIUM 200 MCG: 100 TABLET ORAL at 06:25

## 2018-01-01 RX ADMIN — AZITHROMYCIN FOR INJECTION INJECTION, POWDER, LYOPHILIZED, FOR SOLUTION 500 MG: 500 INJECTION INTRAVENOUS at 17:15

## 2018-01-01 RX ADMIN — IPRATROPIUM BROMIDE AND ALBUTEROL SULFATE 3 ML: .5; 3 SOLUTION RESPIRATORY (INHALATION) at 10:03

## 2018-01-01 RX ADMIN — HEPARIN SODIUM 5000 UNITS: 5000 INJECTION, SOLUTION INTRAVENOUS; SUBCUTANEOUS at 22:54

## 2018-01-01 RX ADMIN — SODIUM CHLORIDE SOLN NEBU 3% 3 ML: 3 NEBU SOLN at 11:16

## 2018-01-01 RX ADMIN — CARVEDILOL 25 MG: 25 TABLET, FILM COATED ORAL at 08:26

## 2018-01-01 RX ADMIN — ISOSORBIDE DINITRATE 20 MG: 10 TABLET ORAL at 23:23

## 2018-01-01 RX ADMIN — SODIUM CHLORIDE SOLN NEBU 3% 3 ML: 3 NEBU SOLN at 14:22

## 2018-01-01 RX ADMIN — ATORVASTATIN CALCIUM 20 MG: 20 TABLET, FILM COATED ORAL at 08:27

## 2018-01-01 RX ADMIN — IPRATROPIUM BROMIDE AND ALBUTEROL SULFATE 3 ML: .5; 3 SOLUTION RESPIRATORY (INHALATION) at 06:33

## 2018-01-01 RX ADMIN — SODIUM CHLORIDE: 9 INJECTION, SOLUTION INTRAVENOUS at 01:30

## 2018-01-01 RX ADMIN — ACETAMINOPHEN 650 MG: 325 TABLET, FILM COATED ORAL at 20:45

## 2018-01-01 RX ADMIN — ACETYLCYSTEINE 4 ML: 100 INHALANT RESPIRATORY (INHALATION) at 18:28

## 2018-01-01 RX ADMIN — SODIUM CHLORIDE: 9 INJECTION, SOLUTION INTRAVENOUS at 13:30

## 2018-01-01 RX ADMIN — LEVOTHYROXINE SODIUM 200 MCG: 100 TABLET ORAL at 05:53

## 2018-01-01 RX ADMIN — ATROPINE SULFATE 2 DROP: 10 SOLUTION/ DROPS OPHTHALMIC at 11:35

## 2018-01-01 RX ADMIN — FUROSEMIDE 20 MG: 10 INJECTION, SOLUTION INTRAMUSCULAR; INTRAVENOUS at 05:57

## 2018-01-01 RX ADMIN — IPRATROPIUM BROMIDE AND ALBUTEROL SULFATE 3 ML: .5; 3 SOLUTION RESPIRATORY (INHALATION) at 18:51

## 2018-01-01 RX ADMIN — SODIUM CHLORIDE SOLN NEBU 3% 3 ML: 3 NEBU SOLN at 14:33

## 2018-01-01 RX ADMIN — IPRATROPIUM BROMIDE AND ALBUTEROL SULFATE 3 ML: .5; 3 SOLUTION RESPIRATORY (INHALATION) at 03:27

## 2018-01-01 RX ADMIN — HEPARIN SODIUM 5000 UNITS: 5000 INJECTION, SOLUTION INTRAVENOUS; SUBCUTANEOUS at 05:57

## 2018-01-01 RX ADMIN — LEVOTHYROXINE SODIUM 200 MCG: 100 TABLET ORAL at 06:26

## 2018-01-01 RX ADMIN — TAMSULOSIN HYDROCHLORIDE 0.4 MG: 0.4 CAPSULE ORAL at 08:27

## 2018-01-01 RX ADMIN — POLYVINYL ALCOHOL 2 DROP: 14 SOLUTION/ DROPS OPHTHALMIC at 10:15

## 2018-01-01 RX ADMIN — HYDRALAZINE HYDROCHLORIDE 25 MG: 25 TABLET, FILM COATED ORAL at 15:30

## 2018-01-01 RX ADMIN — SODIUM CHLORIDE SOLN NEBU 3% 3 ML: 3 NEBU SOLN at 18:51

## 2018-01-01 RX ADMIN — SODIUM CHLORIDE SOLN NEBU 3% 3 ML: 3 NEBU SOLN at 06:26

## 2018-01-01 RX ADMIN — CLOPIDOGREL 75 MG: 75 TABLET, FILM COATED ORAL at 08:27

## 2018-01-01 RX ADMIN — IPRATROPIUM BROMIDE AND ALBUTEROL SULFATE 3 ML: .5; 3 SOLUTION RESPIRATORY (INHALATION) at 18:33

## 2018-01-01 RX ADMIN — FUROSEMIDE 40 MG: 10 INJECTION, SOLUTION INTRAMUSCULAR; INTRAVENOUS at 17:04

## 2018-01-01 RX ADMIN — HEPARIN SODIUM 5000 UNITS: 5000 INJECTION, SOLUTION INTRAVENOUS; SUBCUTANEOUS at 15:30

## 2018-01-01 RX ADMIN — AMPICILLIN SODIUM AND SULBACTAM SODIUM 3 G: 2; 1 INJECTION, POWDER, FOR SOLUTION INTRAMUSCULAR; INTRAVENOUS at 14:24

## 2018-01-01 RX ADMIN — LORAZEPAM 1 MG: 2 SOLUTION, CONCENTRATE ORAL at 15:19

## 2018-01-01 RX ADMIN — IPRATROPIUM BROMIDE AND ALBUTEROL SULFATE 3 ML: .5; 3 SOLUTION RESPIRATORY (INHALATION) at 06:18

## 2018-01-01 RX ADMIN — IPRATROPIUM BROMIDE AND ALBUTEROL SULFATE 3 ML: .5; 3 SOLUTION RESPIRATORY (INHALATION) at 14:34

## 2018-01-01 RX ADMIN — SODIUM CHLORIDE 2130 ML: 9 INJECTION, SOLUTION INTRAVENOUS at 12:00

## 2018-01-01 RX ADMIN — CLOPIDOGREL 75 MG: 75 TABLET, FILM COATED ORAL at 11:29

## 2018-01-01 RX ADMIN — IRBESARTAN 300 MG: 150 TABLET ORAL at 11:30

## 2018-01-01 RX ADMIN — IPRATROPIUM BROMIDE AND ALBUTEROL SULFATE 3 ML: .5; 3 SOLUTION RESPIRATORY (INHALATION) at 14:22

## 2018-01-01 RX ADMIN — MORPHINE SULFATE 20 MG: 100 SOLUTION ORAL at 12:40

## 2018-01-01 RX ADMIN — ISOSORBIDE DINITRATE 20 MG: 10 TABLET ORAL at 14:14

## 2018-01-01 RX ADMIN — ASPIRIN 600 MG: 300 SUPPOSITORY RECTAL at 12:40

## 2018-01-01 RX ADMIN — CARVEDILOL 25 MG: 25 TABLET, FILM COATED ORAL at 16:53

## 2018-01-01 SDOH — ECONOMIC STABILITY: HOUSING INSECURITY: UNSAFE COOKING RANGE AREA: 0

## 2018-01-01 SDOH — ECONOMIC STABILITY: HOUSING INSECURITY: UNSAFE APPLIANCES: 0

## 2018-01-01 SDOH — ECONOMIC STABILITY: HOUSING INSECURITY: HOME SAFETY: PATIENT AT CASCADES ASSISTED LIVING, HAS SAFETY CHECKS EVERY 2 HOURS

## 2018-01-01 SDOH — ECONOMIC STABILITY: GENERAL

## 2018-01-01 SDOH — ECONOMIC STABILITY: HOUSING INSECURITY: HOME SAFETY: S/P FALL WITH ABRASION AND SKIN TEAR LEFT KNEE.

## 2018-01-01 ASSESSMENT — COGNITIVE AND FUNCTIONAL STATUS - GENERAL
HELP NEEDED FOR BATHING: TOTAL
CLIMB 3 TO 5 STEPS WITH RAILING: TOTAL
MOBILITY SCORE: 12
WALKING IN HOSPITAL ROOM: A LOT
DRESSING REGULAR UPPER BODY CLOTHING: A LOT
PERSONAL GROOMING: A LOT
MOVING TO AND FROM BED TO CHAIR: UNABLE
TOILETING: TOTAL
STANDING UP FROM CHAIR USING ARMS: A LITTLE
SUGGESTED CMS G CODE MODIFIER DAILY ACTIVITY: CM
MOVING FROM LYING ON BACK TO SITTING ON SIDE OF FLAT BED: UNABLE
SUGGESTED CMS G CODE MODIFIER MOBILITY: CL
DAILY ACTIVITIY SCORE: 8
EATING MEALS: TOTAL
DRESSING REGULAR LOWER BODY CLOTHING: TOTAL

## 2018-01-01 ASSESSMENT — GAIT ASSESSMENTS
ASSISTIVE DEVICE: HAND HELD ASSIST
GAIT LEVEL OF ASSIST: MODERATE ASSIST
DISTANCE (FEET): 3

## 2018-01-01 ASSESSMENT — ACTIVITIES OF DAILY LIVING (ADL)
EATING_REQUIRES_ASSISTANCE: 1
BATHING_REQUIRES_ASSISTANCE: 1
MONEY MANAGEMENT (EXPENSES/BILLS): TOTALLY DEPENDENT
MONEY MANAGEMENT (EXPENSES/BILLS): TOTALLY DEPENDENT
AMBULATION_REQUIRES_ASSISTANCE: 1
PHYSICAL_TRANSFER_REQUIRES_ASSISTANCE: 1
BATHING_REQUIRES_ASSISTANCE: 1
DRESSING_REQUIRES_ASSISTANCE: 1
DRESSING_REQUIRES_ASSISTANCE: 1
CONTINENCE_REQUIRES_ASSISTANCE: 1
EATING_REQUIRES_ASSISTANCE: 1
PHYSICAL_TRANSFER_REQUIRES_ASSISTANCE: 1
CONTINENCE_REQUIRES_ASSISTANCE: 1

## 2018-01-01 ASSESSMENT — ENCOUNTER SYMPTOMS
FORGETFULNESS: 1
SHORTNESS OF BREATH: 1
LAST BOWEL MOVEMENT: 64677
SOMNOLENCE: 1
FATIGUE: 1
LAST BOWEL MOVEMENT: 64675
PALPITATIONS: 1
SPUTUM AMOUNT: COPIOUS
SOMNOLENCE: 1
COUGH CHARACTERISTICS: CONGESTED
ONSET QUALITY: INTERMITTENT
DYSPNEA ACTIVITY LEVEL: AT REST
FATIGUE: 1
SHORTNESS OF BREATH: 1
SLEEP QUALITY: FAIR
COUGH: 1
COUGH: 1
CHANGE IN LEVEL OF CONSCIOUSNESS: 1
AGITATION: 1
DESCRIPTION OF MEMORY LOSS: LONG TERM
DESCRIPTION OF MEMORY LOSS: SHORT TERM
DRY SKIN: 1
LAST BOWEL MOVEMENT: 64677
SPUTUM COLOR: CLEAR
CHEST TIGHTNESS: 1
SHORTNESS OF BREATH: 1
SLEEP QUALITY: ADEQUATE
COUGH CHARACTERISTICS: MOIST
SPUTUM PRODUCTION: 1
COUGH CHARACTERISTICS: MOIST
FATIGUES EASILY: 1
DRY SKIN: 1
SLEEP QUALITY: ADEQUATE
DYSPNEA ACTIVITY LEVEL: AT REST

## 2018-01-01 ASSESSMENT — PAIN SCALES - GENERAL
PAINLEVEL_OUTOF10: 0
PAINLEVEL_OUTOF10: 0
PAINLEVEL_OUTOF10: ASSUMED PAIN PRESENT
PAINLEVEL_OUTOF10: 0
PAINLEVEL_OUTOF10: ASSUMED PAIN PRESENT
PAINLEVEL_OUTOF10: 0
PAINLEVEL_OUTOF10: 0

## 2018-01-01 ASSESSMENT — SOCIAL DETERMINANTS OF HEALTH (SDOH)
ACTIVE STRESSOR - NO STRESS FACTORS: 1
ACTIVE STRESSOR - LOSS OF CONTROL: 1
ACTIVE STRESSOR - HEALTH CHANGES: 1

## 2018-01-01 ASSESSMENT — LIFESTYLE VARIABLES: EVER_SMOKED: NEVER

## 2018-01-01 ASSESSMENT — COPD QUESTIONNAIRES
HAVE YOU SMOKED AT LEAST 100 CIGARETTES IN YOUR ENTIRE LIFE: NO/DON'T KNOW
DURING THE PAST 4 WEEKS HOW MUCH DID YOU FEEL SHORT OF BREATH: SOME OF THE TIME
DO YOU EVER COUGH UP ANY MUCUS OR PHLEGM?: NO/ONLY WITH OCCASIONAL COLDS OR INFECTIONS
COPD SCREENING SCORE: 3

## 2018-01-01 ASSESSMENT — NEW YORK HEART ASSOCIATION (NYHA) CLASSIFICATION
PATIENT MEETS NYHA AND SIGNIFICANT SYMPTOMS CRITERIA: 1
PATIENT MEETS NYHA AND SIGNIFICANT SYMPTOMS CRITERIA: 1

## 2018-01-23 PROBLEM — R41.82 ALTERED MENTAL STATUS: Status: ACTIVE | Noted: 2018-01-01

## 2018-01-23 PROBLEM — J18.9 PNEUMONIA: Status: ACTIVE | Noted: 2018-01-01

## 2018-01-23 NOTE — H&P
IDENTIFICATION:  This is an 84-year-old male.    PRIMARY CARE PHYSICIAN:  Evelin Mccray MD.    The patient resides at [facility].    CHIEF COMPLAINT:  Altered level of consciousness, shortness of breath and   cough.    HISTORY OF PRESENT ILLNESS:  This 84-year-old male with a history of dementia,   angina, dyslipidemia, hypothyroidism, coronary artery disease.  The patient   apparently usually is up to self, found, brought in with weakness, cough,   dyspnea.  The patient was given some DuoNebs en route and was brought to the   emergency room.  The patient was evaluated by Dr. Pate.  The patient here   has been placed on oxygen 4 liters and has been identified likely to have a   pulmonary infection, is altered.  There is currently no other significant   history obtainable from the patient, he is not a historian.  There is no   family at the bedside.  Per history, he has a DNAR order.  The patient will be   admitted for further evaluation and treatment.  Again, he is not a historian   at this time on admission.    ALLERGIES:  REPORTED TO COLCHICINE AND IBUPROFEN.    MEDICATIONS:  Medication regimen on admission as follows:  1.  Norvasc 5 mg daily.  2.  Lipitor 20 mg daily.  3.  Coreg 12.5 mg p.o. b.i.d.  4.  Plavix 75 mg daily.  5.  Vitamin B12 1000 mcg daily.  6.  Docusate sodium 100 mg daily.  7.  Avapro 150 mg daily.  8.  Synthroid 175 mcg daily.  9.  Ranexa 1000 mg p.o. b.i.d.  10.  Flomax 0.4 mg daily.  11.  Hytrin 2 mg p.o. daily.    PAST MEDICAL HISTORY:  1.  Coronary artery disease with stable angina.  2.  Hypertension.  3.  Dyslipidemia.  4.  Hypothyroidism.  5.  Dementia.  6.  BPH.  7.  History of anemia.  8.  History of aortic insufficiency and aortic regurgitation.  9.  Mitral regurgitation.  10.  History of pulmonary hypertension.  11.  Gout.  12.  Chronic kidney disease stage III.    PAST SURGICAL HISTORY:  1.  History of thoracic aortic aneurysm repair.  2.  History of stent placement.  3.  History  of neck mass excision.  4.  Cholecystectomy.    SOCIAL HISTORY:  The patient has history of smoking and daily wine use,   currently unknown status.    FAMILY HISTORY:  Unobtainable.    REVIEW OF SYSTEMS:  Unobtainable.    PHYSICAL EXAMINATION:  VITAL SIGNS:  The patient is found to have temperature 36.7, pulse 66,   respirations 25, blood pressure 118/43.  The patient is saturating 96% on 4   liters.  GENERAL:  This is an elderly male, in no acute distress, no respiratory   distress, confused.  HEENT:  Normocephalic, atraumatic.  EOMI.  PERRLA.  NECK:  Stiff range of motion.  LUNGS:  Bilateral coarse rhonchi and rales at the bases.  HEART:  Regular rate.  S1 and S2 appear normal.  ABDOMEN:  Flat.  There is no tenderness.  GENITOURINARY:  Normal external male genitalia.  RECTAL:  Exam is deferred.  MUSCULOSKELETAL:  No clubbing, cyanosis, or edema.  NEUROLOGIC:  The patient is alert and oriented x1, appears to be nonfocal.    LABORATORY DATA AND IMAGING:  White blood cell count 4.7, hemoglobin 11.3,   hematocrit 34.3, platelet count 79, neutrophils 78.  Sodium is 136, potassium   _____, chloride 108, bicarbonate 19, glucose 145, BUN of 66 with a creatinine   of 2.31.  His total bilirubin is 2.0.  His lactic acid 2.1, followup is 1.9.    His influenza status here negative.  Chest x-ray shows bilateral hazy   infiltrates, stable cardiomegaly, atherosclerotic plaque.  EKG showed a sinus   rhythm, rate of 71 with PACs.  He does have some inverted T waves in lateral   leads as well as some V3 isolated ST change.  CT of the head is currently   pending.    ASSESSMENT AND PLAN:  1.  Altered level of consciousness in an elderly male with underlying   dementia, unclear if the patient suffered an event versus other.  We will   order a CT of the head and further delineate the patient considerably has   increased confusion secondary to his cough and pulmonary condition.  The   patient may be further evaluated.  2.  Coronary  artery disease with a history of stable angina, now with EKG   changes, will be followed up by troponin levels and might need some further   cardiac workup depending on followup laboratory levels and EKG monitoring.    The patient will be admitted to telemetry.  3.  Pneumonia, community-acquired.  The patient will be treated   comprehensively.  We will try to get some cultures.  Follow up on his   oxygenation and respiratory protocol.  4.  Dyslipidemia.  Continue statin medication.  5.  Hypothyroidism.  Continue replacement therapy.  6.  History of significant valvular heart disease with multiple conditions as   reported above.  We will check an echocardiogram and follow up on his status.    Certainly, the patient in light of his age is not a candidate for valve   repair with this.  7.  History of benign prostatic hypertrophy.  We will check a urinalysis to   rule out underlying possible infection.  8.  History of gout.  9.  Chronic kidney disease stage III, which has currently somewhat worsened.    We will hydrate the patient slightly.  Follow up also on his potassium, which   is slightly elevated as well and hopefully get the patient back down to   baseline.  Patient will be closely monitored, see his outpatient Dr. Park.  10.  Anemia, likely of chronic disease.  11.  Thrombocytopenia, which is slightly more pronounced than prior. We will   follow.  12.  Advanced age.    OVERALL PLAN:  The patient is admitted with altered level of consciousness,   underlying dementia, current pneumonia, hypoxemia with multiple other medical   conditions including valvular heart disease.  We will do further workup.    There is currently no family at the bedside to discuss aggressiveness of his   care.  The patient is a DNR.    Time spent on admission 45 minutes.  Patient will likely require 2+ overnight   stays.       ____________________________________     MD GURVINDER SEARS / CAMILO    DD:  01/23/2018 13:15:22  DT:   01/23/2018 13:48:38    D#:  [id number]  Job#:  [id number]

## 2018-01-23 NOTE — ED PROVIDER NOTES
ED Provider Note    CHIEF COMPLAINT  Chief Complaint   Patient presents with   • Cough   • ALOC       HPI  Erica Fisher is a 84 y.o. male with a history of hypertension, thyroid disease, dyslipidemia, and BPH who presents for evaluation from [facility] for cough, confusion noted this morning.    Pt unable to give additional history due to confusion      ALLERGIES  Allergies   Allergen Reactions   • Colchicine Diarrhea and Vomiting     Diarrhea, n/v   • Ibuprofen        CURRENT MEDICATIONS  Home Medications     Reviewed by Ria Up (Pharmacy Tech) on 01/23/18 at 1123  Med List Status: Complete   Medication Last Dose Status   amlodipine (NORVASC) 5 MG Tab 1/22/2018 Active   atorvastatin (LIPITOR) 20 MG Tab 1/22/2018 Active   carvedilol (COREG) 12.5 MG Tab 1/22/2018 Active   clopidogrel (PLAVIX) 75 MG Tab 1/22/2018 Active   cyanocobalamin (VITAMIN B12) 1000 MCG Tab 1/22/2018 Active   docusate sodium (COLACE) 100 MG Cap 1/22/2018 Active   irbesartan (AVAPRO) 150 MG Tab 1/22/2018 Active   levothyroxine (SYNTHROID) 175 MCG Tab 1/22/2018 Active   RANEXA 500 MG TABLET SR 12 HR 1/22/2018 Active   tamsulosin (FLOMAX) 0.4 MG capsule 1/22/2018 Active   terazosin (HYTRIN) 2 MG Cap 1/22/2018 Active                PAST MEDICAL HISTORY   has a past medical history of AAA (abdominal aortic aneurysm) (CMS-HCC); Abnormal electroencephalogram (EEG) (4/26/17); Anemia; Angina effort (CMS-HCC); Bifid sternum; CAD (coronary artery disease); CKD (chronic kidney disease) stage 3, GFR 30-59 ml/min; Closed Colles' fracture; Dyslipidemia, goal LDL below 100; Electroencephalogram (EEG) abnormality without seizure; Encounter for interrogation of cardiac recorder; Hemorrhagic disorder (CMS-HCC); Hypertension; Hypothyroidism; Mitral valve regurgitation; Myocardial infarct (2000); Paroxysmal atrial fibrillation (CMS-HCC); Severe aortic insufficiency; Valvular heart disease; and Vitamin d deficiency.    SURGICAL HISTORY   has a past  surgical history that includes colonoscopy (6/2010); hernia repair; cholecystectomy; other cardiac surgery; neck mass excision (10/31/2014); aaa with stent graft; recovery (6/17/2016); other; other; and recovery (7/1/2016).    SOCIAL HISTORY  Social History     Social History Main Topics   • Smoking status: Former Smoker     Types: Pipe     Quit date: 7/6/1983   • Smokeless tobacco: Never Used   • Alcohol use No   • Drug use: No   • Sexual activity: Not Currently     Partners: Female       Family Hx:  Unobtainable due to altered mental state      REVIEW OF SYSTEMS  Full review of systems is unobtainable secondary to altered mental state    PHYSICAL EXAM  VITAL SIGNS: /43   Pulse 69   Temp 36.7 °C (98.1 °F)   Resp (!) 28   Ht 1.829 m (6')   Wt 71 kg (156 lb 8.4 oz)   SpO2 96%   BMI 21.23 kg/m²     General:  WDWN; somnolent, arousable, confused--oriented to self; V/S as above; tachypneic, afebrile  Skin: warm and dry; slightly pale color; no rash  HEENT: NCAT; EOMs intact; cataracts noted; no scleral icterus; dry lips  Neck: FROM; no meningismus, no JVD  Cardiovascular: Regular heart rate and rhythm.  No murmurs, rubs, or gallops; pulses 2+ bilaterally radially and DP areas  Lungs: Decrease air movement bilaterally.  Rhonchi noted on the left; no wheezes or rales  Abdomen: BS present; soft; NTND; no rebound, guarding, or rigidity.  No organomegaly or pulsatile mass  Extremities: LANE x 4; no e/o trauma; no pedal edema; neg Indra's  Neurologic: CNs III-XII grossly intact; speech slightly slurred; distal sensation intact  Psychiatric: Appropriate affect, normal mood    LABS  Results for orders placed or performed during the hospital encounter of 01/23/18   Lactic acid (lactate)   Result Value Ref Range    Lactic Acid 2.1 (H) 0.5 - 2.0 mmol/L   CBC WITH DIFFERENTIAL   Result Value Ref Range    WBC 4.7 (L) 4.8 - 10.8 K/uL    RBC 3.48 (L) 4.70 - 6.10 M/uL    Hemoglobin 11.2 (L) 14.0 - 18.0 g/dL    Hematocrit  34.3 (L) 42.0 - 52.0 %    MCV 98.6 (H) 81.4 - 97.8 fL    MCH 32.2 27.0 - 33.0 pg    MCHC 32.7 (L) 33.7 - 35.3 g/dL    RDW 50.4 (H) 35.9 - 50.0 fL    Platelet Count 79 (L) 164 - 446 K/uL    MPV 12.4 9.0 - 12.9 fL    Neutrophils-Polys 78.50 (H) 44.00 - 72.00 %    Lymphocytes 10.00 (L) 22.00 - 41.00 %    Monocytes 11.30 0.00 - 13.40 %    Eosinophils 0.00 0.00 - 6.90 %    Basophils 0.20 0.00 - 1.80 %    Neutrophils (Absolute) 3.70 1.82 - 7.42 K/uL    Lymphs (Absolute) 0.47 (L) 1.00 - 4.80 K/uL    Monos (Absolute) 0.53 0.00 - 0.85 K/uL    Eos (Absolute) 0.00 0.00 - 0.51 K/uL    Baso (Absolute) 0.01 0.00 - 0.12 K/uL   COMP METABOLIC PANEL   Result Value Ref Range    Sodium 136 135 - 145 mmol/L    Potassium 5.7 (H) 3.6 - 5.5 mmol/L    Chloride 108 96 - 112 mmol/L    Co2 19 (L) 20 - 33 mmol/L    Anion Gap 9.0 0.0 - 11.9    Glucose 145 (H) 65 - 99 mg/dL    Bun 66 (H) 8 - 22 mg/dL    Creatinine 2.31 (H) 0.50 - 1.40 mg/dL    Calcium 8.9 8.5 - 10.5 mg/dL    AST(SGOT) 13 12 - 45 U/L    ALT(SGPT) 12 2 - 50 U/L    Alkaline Phosphatase 55 30 - 99 U/L    Total Bilirubin 2.0 (H) 0.1 - 1.5 mg/dL    Albumin 3.6 3.2 - 4.9 g/dL    Total Protein 5.9 (L) 6.0 - 8.2 g/dL    Globulin 2.3 1.9 - 3.5 g/dL    A-G Ratio 1.6 g/dL   ESTIMATED GFR   Result Value Ref Range    GFR If  33 (A) >60 mL/min/1.73 m 2    GFR If Non  27 (A) >60 mL/min/1.73 m 2     *Note: Due to a large number of results and/or encounters for the requested time period, some results have not been displayed. A complete set of results can be found in Results Review.         EKG  12 Lead EKG obtained at 1220 and interpreted by me to show:  Rhythm: Sinus rhythm   Rate: 71  Intervals:   CO: 144   QRS: 116   QTC: 453   All intervals are within normal limits  No ectopy  Leftward axis  ST elevation in V3  T wave inversion in V2-V6. I and aVL  Q waves in III  Clinical Impression: sinus rhythm with t wave inversions laterally and isolated ST elevation  anteriorly not meeting thrombolytic criteria   Compared to previous EKG dated 11/2017 which shows no STchanges/no t wave inversions  EKG has been confirmed in [facility]       IMAGING  CT-HEAD W/O   Final Result      No acute intracranial abnormality is identified.      Atrophy      There are extensive periventricular and subcortical white matter changes present.  This finding is nonspecific and could be from previous small vessel ischemia, demyelination, or gliosis.      Foci of air within the cavernous sinuses may be related to IV line placement.      Mucosal thickening in the paranasal sinuses.      DX-CHEST-PORTABLE (1 VIEW)   Final Result      Hazy bilateral airspace opacities may represent pulmonary edema or multifocal pneumonia.      Stable cardiomegaly.      Atherosclerotic plaque.      ECHOCARDIOGRAM COMP W/O CONT    (Results Pending)     MEDICAL RECORD  I have reviewed patient's medical record and pertinent results are listed below.      COURSE & MEDICAL DECISION MAKING  I have reviewed any medical record information, laboratory studies and radiographic results as noted.    Erica Fisher is a 84 y.o. male who presents for evaluation of altered mental status/confusion and cough. Patient does not offer any complaints. He speaks words but is nonsensical.      NS bolus was ordered for possible dehydration related to patient's lactic acid of 2.1 per sepsis protocol. Given the likely respiratory source of possible sepsis, I ordered Rocephin.    Patient with mild leukopenia which has been present on prior blood draws. Anemia is also stable. Chemistry panel demonstrates hyperkalemia which has also been present on prior blood draws. Patient's creatinine is elevated to 2.3 with an apparent baseline of 2. CO2 is low at 19. Likely indicative of dehydration.    12:07 PM  I discussed the case with Dr. Stevenson for evaluation    12:40 PM  EKG noted and found to have new t wave inversions possibly indicative of ischemia.   ASA ordered rectally.  BNP and troponin are pending  I advised Dr. Stevenson regarding the abnormal EKG    CT head negative for acute pathology.    FINAL IMPRESSION  1. Dehydration    2. Confusion    3. Cough      Electronically signed by: Denise Pate, 1/23/2018 11:31 AM

## 2018-01-23 NOTE — ED NOTES
Chief Complaint   Patient presents with   • Cough   • ALOC     Patient janki POLLACK from [facility] assisted [facility] facility. Staff states patient is normally up and taking care of himself by 0600, today staff had to wake him, he was not acting himself. Patient has productive cough and wheezing, given duoneb in route. Sepsis score 3, orders initiated, ERP to eval.

## 2018-01-23 NOTE — DIETARY
Nutrition Support Cortrak Assessment - Male    Erica Fisher is a 84 y.o. male with admitting DX of Pneumonia, Altered mental status    Pertinent History: Vitamin D deficiency, Valvular heart disease, Severe aortic insufficiency, Paroxysmal a-fib, MI, Mitral valve regurgitation, Hypothyroidism, HTN, Hemorrhagic disorder, Encounter for interrogation of cardiac recorder, EEG abnormality without seizure, Dyslipidemia, Closed colles' fracture, CKD, CAD, Bifid sternum, Angina effort, Anemia, Abnormal EEG, AAA  Allergies:  Colchicine and Ibuprofen  Height: 182.9 cm (6')  Weight: 71 kg (156 lb 8.4 oz)  Weight to Use in Calculations: 71 kg (156 lb 8.4 oz)  Ideal Body Weight: 80.7 kg (178 lb)  Percent Ideal Body Weight: 87.9  Body mass index is 21.23 kg/m².     Pertinent Labs: Potassium 5.7, Glucose 145, BUN 66, Creatinine 2.31  Pertinent Medications: Norvasc, Aspirin, Zithromax, Colace, Heparin, Pericolace, Ampicillin  Pertinent Fluids:  mL/hr  Surgery / Procedures: none this admit  Skin:  Red non-blanching noted on coccyx and sacrum; consult pending to the wound team    Estimated Needs:  MSJ x 1.2 = 1727 kcals/day  Total Calories / day: 1727 -  1927 (Calories / k - 27)  Total Grams Protein / day: 84 - 99  (Grams Protein / k.2 - 1.4)  Total Fluids ml / day: 1778 mL         Assessment / Evaluation:   Pt admitted with altered level of consciousness, hx of dementia.  Tube feed consult received.    Plan / Recommendation:   · Once Cortrak placed and verified, start Fibersource HN @ 25 mL/hr and advance per protocol to goal rate of 65 mL/hr.  Tube feeding at goal provides 1872 kcals, 84 grams of protein and 1264 mL of free water per day.  · Fluids per MD.  · RD to monitor wt and lab trends.  · PO diet when safe/appropriate per SLP/MD and pt can adequately consume.    RD following.

## 2018-01-24 PROBLEM — I50.21 ACUTE SYSTOLIC CONGESTIVE HEART FAILURE (HCC): Status: ACTIVE | Noted: 2018-01-01

## 2018-01-24 PROBLEM — D61.818 PANCYTOPENIA (HCC): Status: ACTIVE | Noted: 2018-01-01

## 2018-01-24 PROBLEM — G93.40 ENCEPHALOPATHY: Status: ACTIVE | Noted: 2018-01-01

## 2018-01-24 PROBLEM — I10 HTN (HYPERTENSION): Status: ACTIVE | Noted: 2018-01-01

## 2018-01-24 PROBLEM — N18.4 CHRONIC KIDNEY DISEASE (CKD), STAGE IV (SEVERE) (HCC): Status: ACTIVE | Noted: 2018-01-01

## 2018-01-24 PROBLEM — I71.00 AORTIC DISSECTION (HCC): Status: ACTIVE | Noted: 2018-01-01

## 2018-01-24 PROBLEM — J96.01 ACUTE RESPIRATORY FAILURE WITH HYPOXIA (HCC): Status: ACTIVE | Noted: 2018-01-01

## 2018-01-24 PROBLEM — I38 VALVULAR DISEASE: Status: ACTIVE | Noted: 2018-01-01

## 2018-01-24 PROBLEM — E80.6 HYPERBILIRUBINEMIA: Status: ACTIVE | Noted: 2018-01-01

## 2018-01-24 PROBLEM — R13.10 DYSPHAGIA: Status: ACTIVE | Noted: 2018-01-01

## 2018-01-24 PROBLEM — I21.4 NSTEMI (NON-ST ELEVATED MYOCARDIAL INFARCTION) (HCC): Status: ACTIVE | Noted: 2018-01-01

## 2018-01-24 PROBLEM — E03.9 HYPOTHYROIDISM: Status: ACTIVE | Noted: 2018-01-01

## 2018-01-24 NOTE — THERAPY
Physical Therapy Contact Note    PT eval attempted, pt's respiratory status too tenus per bedside RN; will hold eval today and follow up tomorrow as appropriate;    Manjula Leblanc, PT, DPT Pager: [phone]

## 2018-01-24 NOTE — PROGRESS NOTES
Son Anibal GRIGSBY returned call. Updated him on patient condition. Per Anibal GRIGSBY ok to place cortrack and restraints if necessary. States his dad is always confused when he gets an infection. Numbers provided for Anibal to call to get updates on his dad. No additional questions at this time.

## 2018-01-24 NOTE — CARE PLAN
Problem: Safety  Goal: Will remain free from injury  Outcome: PROGRESSING AS EXPECTED  Discussed POC, use of call light, and safety with pt. Verbalized understanding.

## 2018-01-24 NOTE — THERAPY
"Speech Language Therapy Clinical Swallow Evaluation completed.  Functional Status: Patient seen for swallow evaluation at request of RN as he does have oral medications ordered.   Patient sleepy but does awaken to verbal stimuli.  He was oriented to person and \"hospital\" with confusion in all other spheres. Speech is slow and vocal quality overall gravely.  Patient with noted expiratory wheezing.  Patient was able to inconsistently follow commands and respond to verbal questions.  There is slight left side facial droop noted at rest, but unsure if this is baseline, and currently, patient is not able to provide any significant history. Per chart review, patient was placed on dysphagia II diet with NTL during May 2017 stay; however, patient was not able to provide any details regarding his current swallowing status at this time. Presentation of PO consisted of single ice chips, five 1/2 tsp boluses of nectar thick liquids and two 1/4 teaspoon boluses of applesauce. Patient with what appeared to be oral holding and often needed cues to trigger swallow.  He had coughing during oral phase of ice chips and following swallow of ice chips.  He had delayed coughing following 3/5 boluses of nectars and 2/2 pureed boluses which is consistent with possible penetration/aspiration. Furthermore, as PO trials progressed, he did have increased WOB and audible upper airway congestion. At this time, do not feel patient is safe for PO intake of any kind.  Would recommend consideration non oral route for medication delivery.  SLP can reassess in am and determine if a non oral source of nutrition is warranted.  RN notified and will keep patient NPO tonight.  She will be calling admitting hospitalist and will notify him of results of this evaluation.  Thank you for the consult.    Recommendations - Diet:  NPO--SLP can reassess in am                          Medication Administration:  Via alternative route--NOT safe for PO " "medications  Plan of Care: Will benefit from Speech Therapy 3 times per week  Post-Acute Therapy: Discharge to a transitional care facility for continued skilled therapy services vs Discharge to home with outpatient or home health for additional skilled therapy services.    See \"Rehab Therapy-Acute\" Patient Summary Report for complete documentation.     "

## 2018-01-24 NOTE — PROGRESS NOTES
"Pt alert and oriented x1. Disoriented to place, time, and event. No visible signs of distress. Pt reports no pain or SOB. Pt verbalizing wanting to leave stating \"there is no reason he is here.\"  Cortrak in place at 65cm. Fibersource running at 25ml hr. Instructed to use call light with verbalized understanding. Will continue to monitor.    "

## 2018-01-24 NOTE — CARE PLAN
Problem: Safety  Goal: Will remain free from falls  Outcome: PROGRESSING AS EXPECTED  Discussed with patient the importance to use call light when assistance is needed. Patient verbalized understanding. Patient has bed alarm on, call light within reach, treaded socks on, and hourly rounding in place.     Problem: Knowledge Deficit  Goal: Knowledge of disease process/condition, treatment plan, diagnostic tests, and medications will improve  Outcome: PROGRESSING AS EXPECTED  Patient educated regarding activity, diet, meds and plan of care. Patient verbalized understanding.

## 2018-01-24 NOTE — PROGRESS NOTES
Pt seen and examined  Chart reviewed  Full consult dictated  S/p remote OM stent (2010), aortic dissection s/p ascending aortic interposition graft repair with residual severe AR and moderate MR, CKD, and dementia admitted for dyspnea, BNP >5000  EF on echo 20%, was normal last year  Has recently been in assisted living  On irbesartan 150, amlodipine 5, terazosin, carvedilol  Would up-titrate irbesartan and try switching amlodipine to hydralazine+nitrates  Platelet count low, would d/c aspirin  He is DNR

## 2018-01-24 NOTE — WOUND TEAM
Wound Team in t assess sacrococcygeal area. Coccyx pink/red, but blanching. Medial buttocks by gluteal cleft with hyperpigmentation. Patient has no wounds to the sacrococcygeal area. No interventions needed from Wound team at this time.

## 2018-01-24 NOTE — PROGRESS NOTES
Assumed care of patient, bedside report received from Era. Updated on POC, call light within reach and fall precautions in place. Bed locked and in lowest position. Patient instructed to call for assistance before getting out of bed. All questions answered, no other needs at this time.     Era DOMINGO spoke with patient's son Anibal regarding if patient would want a feeding tube. Per patient's son it is ok to put in feeding tube. RN educated patient's son that due to his confusion that he may need to be restrained if he pulls out his tubes. Patient's son OK with restraints if needed. Page out to Dr. Stevenson to update him.

## 2018-01-24 NOTE — THERAPY
"Speech Language Therapy dysphagia treatment completed.   Functional Status:  Came to see patient for swallow reassessment this date. Upon entering the room, patient was sitting up at EOB with legs through the railing trying to get up to get \"liquids.\".  Was able to get him back in bed. Patient was oriented to self, \"hospital\" and the fact that he has pneumonia.  Vocal quality was very breathy and patient with audible upper airway congestion and expiratory wheezing, worse than last night.  Was not able to get patient to trigger a cued cough.  Presentation of PO consisted of 4 single ice chips primarily to moisten oral cavity and to stimulate cough to clear secretions.  Patient with labored oral phase and delayed onset of swallow.  He had coughing following swallows of all ice chips which allowed for me to deep suction and clear a copious amount of thick, tenacious secretions which did clog the suction catheter.  All new suction tubing was provided.  Saturations were initially at 88%, but with deep suctioning and clearance of posterior oropharyngeal secretions, saturations did increase to the low 90s.  RN aware and RT called to inform her of current status--anticipate need for NT suctioning.  Worked with patient on deep breathing to maximize breath support.  No further PO trials were given due to high aspiration risk and fragile respiratory status.  At this time, NPO with cortrak feedings is recommended.  SLP will follow for dysphagia therapy as patient is able.      Recommendations: NPO with cortrak  Plan of Care: Will benefit from Speech Therapy 3 times per week  Post-Acute Therapy: Discharge to a transitional care facility for continued skilled therapy services.    See \"Rehab Therapy-Acute\" Patient Summary Report for complete documentation.     "

## 2018-01-24 NOTE — CARE PLAN
Problem: Oxygenation:  Goal: Maintain adequate oxygenation dependent on patient condition  Outcome: PROGRESSING AS EXPECTED    Intervention: Manage oxygen therapy by monitoring pulse oximetry and/or ABG values  5lpm oxymask please titrate as tolerated      Problem: Bronchoconstriction:  Goal: Improve in air movement and diminished wheezing  Outcome: PROGRESSING AS EXPECTED    Intervention: Implement inhaled treatments  DUO Q4       Problem: Bronchopulmonary Hygiene:  Goal: Increase mobilization of retained secretions  Outcome: PROGRESSING AS EXPECTED    Intervention: Perform bronchopulmonary therapy as indicated by assessment  CPT with Flutter valve and 3% QID    NT suction performed once today with moderate amount of yellow secretions  If pt requires frequent NT suction please place trumpet      Problem: Hyperinflation:  Goal: Prevent or improve atelectasis  Outcome: PROGRESSING AS EXPECTED    Intervention: Instruct incentive spirometry usage  60% 1860 best IS value today 500  Intervention: Perform hyperinflation therapy as indicated by assessment  PEP with Flutter valve QID

## 2018-01-24 NOTE — PROGRESS NOTES
Pt up to floor via gurney on zoll without incident. Pt on tele monitor, monitor room notified. Pt disoriented. VSS. Fall precautions and appropriate signs in place. Call light within reach. Will continue to monitor.

## 2018-01-24 NOTE — PROGRESS NOTES
2 RN skin assessment completed by this RN and Ivan VIRK Coccyx and sacrum red non blanching. Bilat heels, elbows, ears, intact. Wound LDA open and wound consult placed.

## 2018-01-24 NOTE — PROGRESS NOTES
Cortrak Placement    Tube Team verified patient name and medical record number prior to tube placement.  Cortrak tube (43 inches, 10 [place]ym[place]) placed at 65 cm in left nare.  Per Cortrak picture, tube appears to be in the stomach.  Nursing Instructions: Awaiting KUB to confirm placement before use for medications or feeding. Once placement confirmed, flush tube with 30 ml of water, and then remove and save stylet, in patient medication drawer.

## 2018-01-24 NOTE — PROGRESS NOTES
Respiratory therapist NG suctioned large amount of yellowish fluid. Ordered x-ray for tube placement. Holding tube feeding and medications at this time.

## 2018-01-24 NOTE — PROGRESS NOTES
Report received from CHAYO Mercado. Assumed care of patient. Updated patient on plan of care. Fall precautions in place, call light within reach.

## 2018-01-24 NOTE — PROGRESS NOTES
Patient removed cortrak and continually removing oxygen despite education. Patient continues to be impulsive. MD Root notified and new orders for restraints received. Patient placed in BL soft wrist restraints. Patient's son Anibal notified.

## 2018-01-24 NOTE — THERAPY
OT referral received. Per RN, pt with tenuous pulmonary status and requiring restraints. RN request hold OT eval until tomorrow. Will complete as able.

## 2018-01-24 NOTE — CONSULTS
"Reason for PC Consult: Advance Care Planning    Consulted by: MD Dk    Assessment:  General:   84 yr male admitted from [facility] with ALOC, SOB and cough; Dx PNA.  Past medical history of CAD with stable angina, HTN, dementia, BPH, anemia, CKD III, aortic insufficiency and aortic regurgitation, pulmonary hypertension and Mitral regurgitation.  Palliative Care referral for advance care planning.  Social- Patient resides at [facility].  His son/Anibal NEWMAN resides in [place].  Previous history of ETOH/tobacco use.  Dyspnea: Yes- 94% 4L/NC  Last BM:  (PTA)-    Pain: No-    Depression: Mood appropriate for situation-    Dementia: Yes;Unknown 7, C    Spiritual:  Is Hinduism or spirituality important for coping with this illness? Unable to determine-    Has a  or spiritual provider visit been requested? No    Palliative Performance Scale: 40%    Advance Directive: Advance Directive, DPOA-    DPOA: Yes- veronica Franks [phone]  POLST: None on File    Code Status: DNR-      Outcome:  Met with the patient at bedside, briefly.  Patient is awake, restless, confused, restrained. PC to son/Anibal NEWMAN.  I introduced myself and role of Palliative Care in the patient's POC.  He states that he understands that GOC is to \"treat the pneumonia and transfer back to [facility].\"  He stated understanding of patient's previous and current medical status.  I presented Hospice Services & Philosophy.  Anibal requested and opportunity to return my call as he was working and wanted the ability to discuss options at length.    Contact information provided.    Updated: Claudia Peguero, RN    Plan: Continue phone conversation and discuss Hospice    Recommendations: Hospice.    Thank you for allowing Palliative Care to participate in this patient's care. Please feel free to call [phone] with any questions or concerns.  "

## 2018-01-25 NOTE — PROGRESS NOTES
[facility] Hospitalist Progress Note    Date of Service: 2018    Chief Complaint  84 y.o. male admitted 2018 with cough and ALOC and admitting diagnosis of pneumonia.     Interval Problem Update  Patient seen and evaluated on rounds  He is confused  He is aware of being in [place], but not month, year, place, president  He is unaware of being in hospital  Following commands  Pneumonia evident, aspiration related  CHF seen, cardiology consulted  Multiple co morbidities, poor prognosis  Requested palliative team consultation to address goals of care with family    Consultants/Specialty  Cardiology    Disposition  Pending resolution of acute medical issues  Anticipate post acute placement        Review of Systems   Unable to perform ROS: Mental acuity      Physical Exam  Laboratory/Imaging   Hemodynamics  Temp (24hrs), Av.2 °C (98.9 °F), Min:37.1 °C (98.7 °F), Max:37.3 °C (99.1 °F)   Temperature: 37.3 °C (99.1 °F)  Pulse  Av.3  Min: 64  Max: 89    Blood Pressure : 126/65      Respiratory      Respiration: (!) 24, Pulse Oximetry: 96 %, O2 Daily Delivery Respiratory : OxyMask     Given By:: Mask, PEP/CPT Method: Positive Airway Pressure Device;Flutter Valve, Work Of Breathing / Effort: Increased Work of Breathing  RUL Breath Sounds: Coarse Crackles, RML Breath Sounds: Coarse Crackles, RLL Breath Sounds: Coarse Crackles, ERIC Breath Sounds: Coarse Crackles, LLL Breath Sounds: Coarse Crackles    Fluids    Intake/Output Summary (Last 24 hours) at 18 1709  Last data filed at 18 0544   Gross per 24 hour   Intake                0 ml   Output              900 ml   Net             -900 ml       Nutrition  Orders Placed This Encounter   Procedures   • Diet NPO     Standing Status:   Standing     Number of Occurrences:   1     Order Specific Question:   Restrict to:     Answer:   Sips with Medications [3]     Physical Exam   Constitutional: He appears well-developed and well-nourished. No distress.   HENT:    Head: Normocephalic.   Mouth/Throat: Oropharynx is clear and moist. No oropharyngeal exudate.   Eyes: Conjunctivae are normal. Pupils are equal, round, and reactive to light. No scleral icterus.   Neck: JVD present.   Cardiovascular: Normal rate and regular rhythm.    Murmur heard.  Pulmonary/Chest: No stridor. No respiratory distress. He has no wheezes. He has rales.   Extensive rhonchi b/l. Poor clearence of respiratory secretions. Poor cough.    Abdominal: Soft. Bowel sounds are normal. He exhibits no distension. There is no tenderness. There is no rebound.   Musculoskeletal: He exhibits edema (Minimal). He exhibits no tenderness.   Neurological: He is alert. No cranial nerve deficit.   Confused. Following commands. Moving all extremities.    Skin: Skin is warm and dry. He is not diaphoretic.       Recent Labs      01/23/18   1041  01/24/18   0440   WBC  4.7*  4.7*   RBC  3.48*  3.36*   HEMOGLOBIN  11.2*  10.8*   HEMATOCRIT  34.3*  33.4*   MCV  98.6*  99.4*   MCH  32.2  32.1   MCHC  32.7*  32.3*   RDW  50.4*  53.0*   PLATELETCT  79*  71*   MPV  12.4  12.6     Recent Labs      01/23/18   1041  01/24/18   0440   SODIUM  136  140   POTASSIUM  5.7*  5.3   CHLORIDE  108  113*   CO2  19*  16*   GLUCOSE  145*  125*   BUN  66*  62*   CREATININE  2.31*  2.22*   CALCIUM  8.9  9.0     Recent Labs      01/24/18   0828   INR  1.25*     Recent Labs      01/23/18   1041   BNPBTYPENAT  >5000*     Recent Labs      01/24/18   0440   TRIGLYCERIDE  77   HDL  32*   LDL  51          Assessment/Plan     Acute respiratory failure with hypoxia (CMS-HCC)- (present on admission)   Assessment & Plan    2/2 Decompensated CHF and Pneumonia  Continue management of pneumonia and CHF  Aggressive RT care  Q4 BD regimen  CPT / BPH QID        Hyperbilirubinemia- (present on admission)   Assessment & Plan    Suspect this is congestive hepatopathy related  Assess response to diuresis  If persistent obtain US evaluation        NSTEMI (non-ST  elevated myocardial infarction) (CMS-HCC)- (present on admission)   Assessment & Plan    This is 2/2 decompensated acute systolic CHF  Hx CAD on medical management  Continue plavix, BB, Statin, ARB, Ranexa  Optimize CHF  Cardiology team consulted  Further recommendations per cardiology team        Acute systolic congestive heart failure (CMS-HCC)- (present on admission)   Assessment & Plan    Decompensated  IV diuresis with Lasix, monitor renal function, electrolytes  Continue at home regimen of Irbesartan, carvedilol  Isordil, hydralazine  Cardiology consulted  Further recommendations per cardiology team        Encephalopathy- (present on admission)   Assessment & Plan    In the setting of underlying Dementia  Underlying pneumonia, medical issues contributing  Avoid sedative, narcotics / BZDs as able  Management of underlying medical issues  Check B12, folate  Optimize hypothyroidism        Pneumonia- (present on admission)   Assessment & Plan    Multifocal, This is aspiration pneumonia  POA  Aspiration precautions, TF via cortrak, diet per SLP recommendations  IV Unasyn for now  Aggressive RT care, BPH        HTN (hypertension)- (present on admission)   Assessment & Plan    Maintain adequate afterload reduction with systolic CHF  irbesartan, carvedilol  Hydralazine, isordil added per cardiology team        Hypothyroidism- (present on admission)   Assessment & Plan    Persistently elevated TSH  Increased synthroid to 200 mcg  Recheck TSH in 3-4 weeks time        Chronic kidney disease (CKD), stage IV (severe) (CMS-HCC)- (present on admission)   Assessment & Plan    Monitor renal function / Avoid nephrotoxins and dose medications renally  Check PTH, vitamin D, anemia evaluation        Pancytopenia (CMS-HCC)- (present on admission)   Assessment & Plan    Anemia evaluation requested  Monitor Hb / Restrictive transfusion strategy  Consider bone marrow biopsy once acute issues resolve        Aortic dissection (CMS-HCC)-  (present on admission)   Assessment & Plan    Chronic, outpatient VS follow up        Valvular disease- (present on admission)   Assessment & Plan    Severe AI, mod MR  Afterload reduction, diuresis  Further recommendations per cardiology team        Dysphagia- (present on admission)   Assessment & Plan    Continue SLP evaluation  Diet per SLP recommendations  Continue TFs via cortrak at this time        Dementia- (present on admission)   Assessment & Plan    Avoid sedative, narcotics / BZDs as able        Benign prostatic hyperplasia with lower urinary tract symptoms- (present on admission)   Assessment & Plan    Continue at home regimen of alpha blockers        Vitamin B 12 deficiency- (present on admission)   Assessment & Plan    Continue supplementation        Paroxysmal atrial fibrillation (CMS-HCC)- (present on admission)   Assessment & Plan    History of  Carvedilol  Further recommendations per cardiology team        HLD (hyperlipidemia)- (present on admission)   Assessment & Plan    Atorvastatin             Reviewed items::  Labs reviewed, Medications reviewed and Radiology images reviewed  Arrington catheter::  No Arrington  DVT prophylaxis pharmacological::  Heparin  DVT prophylaxis - mechanical:  SCDs  Ulcer Prophylaxis::  Not indicated  Antibiotics:  Treating active infection/contamination beyond 24 hours perioperative coverage

## 2018-01-25 NOTE — PROGRESS NOTES
Cardiology Progress Note               Author: Madelyn Braswell DNP Date & Time created: 2018  8:01 AM     Cardiology consultation for acute systolic heart failure.    Admitted for ALOC at [facility] and cough/congestion.    Hx of CAD with MVD S/P CAMPOS to OM in '10, ascending aortic dissection with grafting in' , severe AR, chronic angina, and now worsening dementia.    : congested and confused, does remember myself from clinic but unable to decipher what he is saying    Telemetry: afib     I/O 24 hours: -500 ml   Review of Systems   Unable to perform ROS: Dementia       Physical Exam   Constitutional: He is oriented to person, place, and time. He appears cachectic. He has a sickly appearance. He appears ill. He appears distressed.   HENT:   Head: Normocephalic and atraumatic.   Eyes: EOM are normal.   Neck: Normal range of motion.   Cardiovascular: Normal rate.  An irregularly irregular rhythm present.   No murmur heard.  Pulmonary/Chest: Accessory muscle usage present. Tachypnea noted. No respiratory distress. He has no wheezes. He has rhonchi in the right upper field and the left lower field. He has no rales.   Abdominal: Soft. Bowel sounds are normal.   Musculoskeletal: He exhibits no edema.   Weak and fatigued in bed   Neurological: He is alert and oriented to person, place, and time.   Skin: Skin is warm and dry.   Psychiatric: He has a normal mood and affect.   Nursing note and vitals reviewed.      Hemodynamics:  Temp (24hrs), Av.4 °C (99.3 °F), Min:37.1 °C (98.7 °F), Max:37.7 °C (99.9 °F)  Temperature: 37.7 °C (99.9 °F)  Pulse  Av.2  Min: 64  Max: 94   Blood Pressure : 115/66     Respiratory:    Respiration: (!) 24, Pulse Oximetry: 94 %, O2 Daily Delivery Respiratory : OxyMask     Given By:: Mask, PEP/CPT Method: Positive Airway Pressure Device, Work Of Breathing / Effort: Moderate;Shallow;Accessory Muscle Use  RUL Breath Sounds: Rhonchi;Coarse Crackles, RML Breath  Sounds: Rhonchi;Coarse Crackles, RLL Breath Sounds: Rhonchi;Coarse Crackles, ERIC Breath Sounds: Rhonchi;Coarse Crackles, LLL Breath Sounds: Rhonchi;Coarse Crackles  Fluids:     Weight: 69.9 kg (154 lb 1.6 oz)  GI/Nutrition:  Orders Placed This Encounter   Procedures   • Diet NPO     Standing Status:   Standing     Number of Occurrences:   1     Order Specific Question:   Restrict to:     Answer:   Sips with Medications [3]     Lab Results:  Recent Labs      01/23/18   1041  01/24/18   0440  01/25/18   0404   WBC  4.7*  4.7*  5.2   RBC  3.48*  3.36*  3.18*   HEMOGLOBIN  11.2*  10.8*  10.3*   HEMATOCRIT  34.3*  33.4*  31.3*   MCV  98.6*  99.4*  98.4*   MCH  32.2  32.1  32.4   MCHC  32.7*  32.3*  32.9*   RDW  50.4*  53.0*  51.4*   PLATELETCT  79*  71*  87*   MPV  12.4  12.6  11.9     Recent Labs      01/23/18   1041  01/24/18   0440   SODIUM  136  140   POTASSIUM  5.7*  5.3   CHLORIDE  108  113*   CO2  19*  16*   GLUCOSE  145*  125*   BUN  66*  62*   CREATININE  2.31*  2.22*   CALCIUM  8.9  9.0     Recent Labs      01/24/18   0828  01/25/18   0404   INR  1.25*  1.37*     Recent Labs      01/23/18   1041   BNPBTYPENAT  >5000*     Recent Labs      01/23/18   1041   01/24/18   0828  01/24/18   1238  01/24/18   1601   TROPONINI   --    < >  0.37*  0.34*  0.40*   BNPBTYPENAT  >5000*   --    --    --    --     < > = values in this interval not displayed.     Recent Labs      01/24/18   0440   TRIGLYCERIDE  77   HDL  32*   LDL  51         Medical Decision Making, by Problem:  Active Hospital Problems    Diagnosis   • Acute respiratory failure with hypoxia (CMS-HCC) [J96.01]   • Acute systolic congestive heart failure (CMS-[facility]) [I50.21]   • NSTEMI (non-ST elevated myocardial infarction) (CMS-HCC) [I21.4]   • Hyperbilirubinemia [E80.6]   • Pneumonia [J18.9]   • Encephalopathy [G93.40]   • Dysphagia [R13.10]   • Valvular disease [I38]   • Aortic dissection (CMS-HCC) [I71.00]   • Pancytopenia (CMS-HCC) [D61.818]   • Chronic  kidney disease (CKD), stage IV (severe) (CMS-HCC) [N18.4]   • Hypothyroidism [E03.9]   • HTN (hypertension) [I10]   • Dementia [F03.90]   • Benign prostatic hyperplasia with lower urinary tract symptoms [N40.1]   • Vitamin B 12 deficiency [E53.8]   • Paroxysmal atrial fibrillation (CMS-HCC) [I48.0]   • HLD (hyperlipidemia) [E78.5]     Plan:  1. Acute respiratory failure with hypoxia in the setting of pneumonia  -remains on oxy mask  -congested  -supportive care with RT protocol and NT suctioning    2. Acute systolic heart failure with rEF of 20%  -new onset, known CAD but poor candidate for aggressive therapies  -dyspnea and congestion remain  -cont arb, up titrate coreg to 25 mg BID  -cont diuresis with lasix 20 mg BID, may need to up titrate if doesn't diurese well  -cont hydralazine/isosorbide combo for afterload reduction  -no aldactone for hx of hyperkalemia and CKD    3. Dementia  -poor historian, he is always a little confused with memory loss in clinic  -now living at [facility]  -recommend palliative care and discussions with son on end of life management    4. CKD  -remains stable, follow with daily labs  -[facility] nephrologist outpatient     5. CAD with MVD  -poor prognosis  -elevated troponin due to demand ischemia  -recommend med management  -asymptomatic at this time  -unable to crush ranexa, discontinue for now  -up titrate bb, follow clinically  -cont plavix only with thromboyctopenia, cont statin    6. Ascending aortic dissection S/P repair in '07  -stable on CT chest, follow clinically    7. PAF with implantable loop recorder  - rate controlled, up titrate coreg  - no OAC with risk v. Benefit and patient refusing in the past     We will follow alongside you in the care of this patient. Agree with palliative care consult.       Reviewed items::  EKG reviewed, Radiology images reviewed, Labs reviewed and Medications reviewed  Arrington catheter::  No Arrington  DVT prophylaxis pharmacological::  Heparin

## 2018-01-25 NOTE — ASSESSMENT & PLAN NOTE
Multifocal, This is aspiration pneumonia  POA  Aspiration precautions, TF via cortrak, diet per SLP recommendations  IV Unasyn for now  Aggressive RT care, BPH

## 2018-01-25 NOTE — CARE PLAN
Problem: Oxygenation:  Goal: Maintain adequate oxygenation dependent on patient condition    Intervention: Manage oxygen therapy by monitoring pulse oximetry and/or ABG values  Patient on 3L via oxy mask; will continue to titrate as tolerated       Problem: Bronchoconstriction:  Goal: Improve in air movement and diminished wheezing    Intervention: Implement inhaled treatments  DUO QID     3% and Mucomyst QID for bronchopulmonary hygiene       Problem: Bronchopulmonary Hygiene:  Goal: Increase mobilization of retained secretions    Intervention: Perform bronchopulmonary therapy as indicated by assessment  IPV w/ mouthseal QID  Intervention: Perform airway suctioning  NT and Oral suctioning as indicated   Intervention: Perform actions to maintain patient airway  Encouraging deep breathing, strong cough, and secretion clearance

## 2018-01-25 NOTE — ASSESSMENT & PLAN NOTE
Decompensated  IV diuresis with Lasix, monitor renal function, electrolytes  Continue at home regimen of Irbesartan, carvedilol  Isordil, hydralazine  Cardiology consulted  Further recommendations per cardiology team

## 2018-01-25 NOTE — PROGRESS NOTES
Rt notified of assistence needed with NT suctioning. Rt to bedside. Nt suction performed by Rt. NPA suggested, ordered, and placed by Rt. Will continue to monitor.

## 2018-01-25 NOTE — ASSESSMENT & PLAN NOTE
Maintain adequate afterload reduction with systolic CHF  irbesartan, carvedilol  Hydralazine, isordil added per cardiology team  Aim SBP < 120

## 2018-01-25 NOTE — PROGRESS NOTES
Cortrak Placement    Tube Team verified patient name and medical record number prior to tube placement.  Cortrak tube (55 inches, 10 [place]) placed at 75 cm in left nare.  Per Cortrak picture, tube appears to be in the stomach.  Nursing Instructions: Awaiting KUB to confirm placement before use for medications or feeding. Once placement confirmed, flush tube with 30 ml of water, and then remove and save stylet, in patient medication drawer.

## 2018-01-25 NOTE — HEART FAILURE PROGRAM
"Cardiovascular Nurse Navigator () Progress Note:     Please note this is a HFrEF pt. As of 1/25 diuresis is ongoing, Cardiology following noting poor prognosis with MVD and worsening dementia. Palliative consult indicates that there is difficulty connecting telephonically with patient's son, Anibal.      Critical access hospital Plan Notes:   None  Therapy Notes:   SLP 1/24 recommending strict NPO with cortrak, PT/OT unable to evaluate  Insurance Coverage:  Medicare  Patient Residence:  [facility]  Follow up appointment:   Unless he goes home with hospice or to SNF, pt must have an appointment scheduled within 7 days of discharge (Cardiology, PCP, or DC Clinic).      This [facility] has placed an order for Hospital Schedulers to arrange f/u appointment within seven calendar days of anticipated discharge date: 1/28. Get into HF Program eventually if not for seven day appt.    Education:  Bedside nursing to please provide patient with HF packet and begin teaching and documenting in education tab, each shift should teach sections until all are covered.     When completing the after visit summary (discharge instructions) please select \"Cardiac Diagnosis, and Heart Failure\" in the special instructions section to populate the heart failure specific discharge instructions.     Referrals Placed:    Social Work     Deferred pending evolution of clinical course and ability to reach son     Registered Dietician  Deferred pending evolution of clinical course and ability to reach son    Thank you and please call JOCELYN Ruth with any questions: [phone].   "

## 2018-01-25 NOTE — CARE PLAN
Problem: Nutritional:  Goal: Nutrition support tolerated and meeting greater than 85% of estimated needs  Outcome: MET Date Met: 01/25/18  Per chart review, pt pulled cortrak yesterday and it was replaced. TF have been restarted and are at goal rate of 65 mL/hr per chart review. RD following

## 2018-01-25 NOTE — ASSESSMENT & PLAN NOTE
In the setting of underlying Dementia  Underlying pneumonia, medical issues contributing  Avoid sedative, narcotics / BZDs as able  Management of underlying medical issues  Optimize hypothyroidism

## 2018-01-25 NOTE — PROGRESS NOTES
Pt awake in bed, alert and oriented to self only. BL soft wrist restraints in place for patient safety. Pt pulled out cortrak per day shift Rn. Will attempt replacement tonight. Restraint orders  in 21 hours. Lungs have crackles throughout. RT in room, pt suctioned without production. Condom cath in place. Will continue to monitor.

## 2018-01-25 NOTE — THERAPY
"Physical Therapy Evaluation completed.   Bed Mobility:  Supine to Sit: Moderate Assist (2/2 lack of initiation; raised HOB)  Transfers: Sit to Stand: Minimal Assist (HHA x 2 reps)  Gait: Level Of Assist: Moderate Assist with hand hold assist x 3 steps  Plan of Care: Will benefit from Physical Therapy 3 times per week  Discharge Recommendations: Equipment: Will Continue to Assess for Equipment Needs.     Pt presents with impaired activity tolerance, LE strength/motor control, and cognition associated with AMS and PNA. Pt with extremely wet cough, could not clear and even cough with mobility was very weak, sp02 remained 91% or higher with mobility; most limited by cognition impacting motor timing/sequencing; pt is actually quite strong in extremities. In current condition, would require SNF at d/c however does appear to have signifcant assist from hx in prior admits at [facility];     See \"Rehab Therapy-Acute\" Patient Summary Report for complete documentation.     "

## 2018-01-25 NOTE — ASSESSMENT & PLAN NOTE
Anemia evaluation requested  Monitor Hb / Restrictive transfusion strategy  Consider bone marrow biopsy once acute issues resolve

## 2018-01-25 NOTE — ASSESSMENT & PLAN NOTE
2/2 Decompensated CHF and Pneumonia  Continue management of pneumonia and CHF  Aggressive RT care  Q4 BD regimen  CPT / BPH QID

## 2018-01-25 NOTE — CARE PLAN
Problem: Safety  Goal: Will remain free from falls  Outcome: PROGRESSING AS EXPECTED  Discussed POC with pt, pt has bed alarm on and call light within reach.     Problem: Knowledge Deficit  Goal: Knowledge of disease process/condition, treatment plan, diagnostic tests, and medications will improve  Outcome: PROGRESSING AS EXPECTED  Discussed POC with pt.

## 2018-01-25 NOTE — CONSULTS
DATE OF SERVICE:  01/24/2018    CARDIOLOGY CONSULTATION    REQUESTING PHYSICIAN:  Dr. Root, hospitalist.    REASON FOR CONSULTATION:  New diagnosis of severely reduced left ventricular   systolic function.    HISTORY OF PRESENT ILLNESS:  The patient is an 84-year-old  male with   history of coronary artery disease status post stenting of the obtuse   marginal branch in the left circumflex system in 2010, history of ascending   type A aortic dissection status post interposition graft repair in 2007 with   chronic angina along with severe aortic regurgitation. He also has memory issue   and has been staying in assisted living for sometime. The patient was   admitted yesterday for shortness of breath, cough and altered mental status.    Brain natriuretic peptide was greater than 5000.  Repeat echocardiography   performed earlier today reportedly showed severely reduced left ventricular   systolic function, which was not present on prior echocardiography in May of   last year. He has been given IV furosemide.      The patient is currently wearing facemask, in mild respiratory distress with   about 30-degree head elevation.    ALLERGIES:  HE IS REPORTEDLY ALLERGIC TO COLCHICINE.    CURRENT MEDICATIONS:  Include amlodipine 5 mg daily, Augmentin IV, 81 mg   aspirin, atorvastatin 20, Zithromax, carvedilol 12.5 twice a day, clopidogrel   75 daily, furosemide 20 mg twice a day, prophylactic heparin subQ, DuoNeb   inhaler, irbesartan 150 mg daily, levothyroxine 200 mcg daily, Ranexa, Flomax,   and terazosin.    PAST MEDICAL HISTORY:  In addition to above-mentioned problems, positive   for chronic kidney disease stage III (creatinine has been running around   slightly above 2 the last couple of years), prior history of paroxysmal atrial   fibrillation, thyroid dysfunction, dyslipidemia and hypertension.    PAST SURGICAL HISTORY:  Remarkable for hernia repair, cholecystectomy, aortic   dissection repair, neck  excision.    FAMILY HISTORY:  No premature coronary disease, sudden death or valvular heart   disease.  Mother has cancer of the jaw.    SOCIAL HISTORY:  Used to smoke pipe, quit smoking in 1983. He drinks socially.    No drug use.    REVIEW OF SYSTEMS:  Difficult to obtain due to shortness of breath and   constant coughing and some memory issue.  Denied any chest pain or fever or   hemoptysis.    PHYSICAL EXAMINATION:  GENERAL:  Reveals an 84-year-old male, somewhat thin, mildly tachypneic with   facemask, alert.  VITAL SIGNS:  Blood pressure 126/65, heart rate 84, respiratory rate 25,   temperature 37.3, O2 sat 90%.  LUNGS:  Bilateral rhonchi, secretion sound.  Decreased breath sound at bases.  HEART:  Distant sounds.  Difficult to elicit heart sounds due to secretions   sound.  Grade II/VI systolic murmur in the left parasternal border.  No rub.  ABDOMEN:  Soft, no mass, no bruits.  EXTREMITIES:  No clubbing, cyanosis or edema.  NEUROLOGIC:  Grossly intact.  SKIN:  Some ecchymosis, no petechiae.  NEUROLOGIC:  Exam nonfocal.    LABORATORY DATA:  CMP this morning, sodium 140, potassium 5.3, BUN 62,   creatinine 2.2, albumin 3.2.  LDL 51, HDL 32, potassium 5.7 yesterday.    Troponin I 0.34, yesterday 0.37.  This morning as mentioned above, brain   natriuretic peptide was greater than 5000.    Hemoglobin 10.8, platelets 71.    IMAGING:  Chest x-ray reportedly showed bilateral airspace opacity with   cardiomegaly.    IMPRESSION:  1.  Acute-on-chronic systolic heart failure, probably due to combination of   severe aortic insufficiency and mitral regurgitation along with prior infarct and   coronary artery disease.  Given his dementia and his age, we would try maximize   medical therapy first. Would up titrate irbesartan and consider switching from   amlodipine to hydralazine, nitrate combination.  Unless there is a significant   improvement in his condition especially dementia, he may not be a candidate   for aortic  valve intervention.  2.  History of coronary artery disease with remote stent with significant   thrombocytopenia.  He is currently on dual antibiotic therapy.  We would   discontinue this one for now.  3.  History of dementia.  4.  Respiratory distress, may have some component of secondary pulmonary infection as well.  5.  History of thyroid dysfunction.  TSH is elevated despite being on   relatively high dose of supplementation.  We may need to verify that he takes   his medication on a regular basis.  6.  History of dyslipidemia.  7.  Hypertension as above.    RECOMMENDATIONS:  As above.  Due to his tendency to develop hyperkalemia, we   would not be able to initiate Aldactone.  We will follow the patient along   with you.    Thank you for allowing us to participate in the care of this patient.       ____________________________________     MD LOUISE VALENCIA / CAMILO    DD:  01/24/2018 13:23:15  DT:  01/24/2018 16:36:34    D#:  [id number]  Job#:  [id number]

## 2018-01-25 NOTE — PALLIATIVE CARE
Palliative Care follow-up  Son Anibal returned call, PC RN placed Anibal on a brief hold and call got dropped. Called Anibal back, no answer left msg to return call to continue discussing GOC.       Updated:   PC Team    Plan:   Discuss GOC with Anibal    Thank you for allowing Palliative Care to participate in this patient's care. Please feel free to call [phone] with any questions or concerns.

## 2018-01-25 NOTE — PROGRESS NOTES
[facility] Hospitalist Progress Note    Date of Service: 2018    Chief Complaint  84 y.o. male admitted 2018 with cough and ALOC and admitting diagnosis of pneumonia.     Interval Problem Update  Patient seen and evaluated on rounds  He remains confused, pulling on catheters / Lines now requiring restraints  He is aware of being in [place], but not month, year, place, president  He is unaware of being in hospital  Following commands  Pneumonia evident, aspiration related  CHF seen, cardiology consulted, appreciate input  Multiple co morbidities, poor prognosis  Requested palliative team consultation to address goals of care with family  Would recommend hospice care in light of underlying co morbidities and poor prognosis    Consultants/Specialty  Cardiology    Disposition  Pending resolution of acute medical issues  Anticipate post acute placement        Review of Systems   Unable to perform ROS: Mental acuity      Physical Exam  Laboratory/Imaging   Hemodynamics  Temp (24hrs), Av.5 °C (99.5 °F), Min:37.2 °C (99 °F), Max:37.7 °C (99.9 °F)   Temperature: 37.7 °C (99.8 °F)  Pulse  Av.7  Min: 64  Max: 98    Blood Pressure : 132/67      Respiratory      Respiration: (!) 26, Pulse Oximetry: 97 %, O2 Daily Delivery Respiratory : OxyMask     Given By:: Mask, PEP/CPT Method: Positive Airway Pressure Device, Given By:: Mouthseal, Work Of Breathing / Effort: Moderate;Shallow;Accessory Muscle Use  RUL Breath Sounds: Rhonchi;Coarse Crackles, RML Breath Sounds: Rhonchi;Coarse Crackles, RLL Breath Sounds: Rhonchi;Coarse Crackles, ERIC Breath Sounds: Rhonchi;Coarse Crackles, LLL Breath Sounds: Rhonchi;Coarse Crackles    Fluids    Intake/Output Summary (Last 24 hours) at 18 1119  Last data filed at 18 2100   Gross per 24 hour   Intake              100 ml   Output              600 ml   Net             -500 ml       Nutrition  Orders Placed This Encounter   Procedures   • Diet NPO     Standing Status:    Standing     Number of Occurrences:   1     Order Specific Question:   Restrict to:     Answer:   Sips with Medications [3]     Physical Exam   Constitutional: He appears well-developed and well-nourished. No distress.   HENT:   Head: Normocephalic.   Mouth/Throat: Oropharynx is clear and moist. No oropharyngeal exudate.   Eyes: Conjunctivae are normal. Pupils are equal, round, and reactive to light. No scleral icterus.   Neck: JVD present.   Cardiovascular: Normal rate and regular rhythm.    Murmur heard.  Pulmonary/Chest: No stridor. No respiratory distress. He has no wheezes. He has rales.   Extensive rhonchi b/l. Poor clearence of respiratory secretions. Poor cough.    Abdominal: Soft. Bowel sounds are normal. He exhibits no distension. There is no tenderness. There is no rebound.   Musculoskeletal: He exhibits edema (Minimal). He exhibits no tenderness.   Neurological: He is alert. No cranial nerve deficit.   Confused. Following commands. Moving all extremities.    Skin: Skin is warm and dry. He is not diaphoretic.       Recent Labs      01/23/18   1041  01/24/18   0440  01/25/18   0404   WBC  4.7*  4.7*  5.2   RBC  3.48*  3.36*  3.18*   HEMOGLOBIN  11.2*  10.8*  10.3*   HEMATOCRIT  34.3*  33.4*  31.3*   MCV  98.6*  99.4*  98.4*   MCH  32.2  32.1  32.4   MCHC  32.7*  32.3*  32.9*   RDW  50.4*  53.0*  51.4*   PLATELETCT  79*  71*  87*   MPV  12.4  12.6  11.9     Recent Labs      01/23/18   1041  01/24/18   0440  01/25/18   0404   SODIUM  136  140  145   POTASSIUM  5.7*  5.3  5.3   CHLORIDE  108  113*  117*   CO2  19*  16*  16*   GLUCOSE  145*  125*  150*   BUN  66*  62*  66*   CREATININE  2.31*  2.22*  2.39*   CALCIUM  8.9  9.0  9.4     Recent Labs      01/24/18   0828  01/25/18   0404   INR  1.25*  1.37*     Recent Labs      01/23/18   1041  01/25/18   0404   BNPBTYPENAT  >5000*  >5000*     Recent Labs      01/24/18   0440   TRIGLYCERIDE  77   HDL  32*   LDL  51          Assessment/Plan     Acute respiratory  failure with hypoxia (CMS-HCC)- (present on admission)   Assessment & Plan    2/2 Decompensated CHF and Pneumonia  Continue management of pneumonia and CHF  Aggressive RT care  Q4 BD regimen  CPT / BPH QID        Hyperbilirubinemia- (present on admission)   Assessment & Plan    Suspect this is congestive hepatopathy related  Assess response to diuresis  If persistent obtain US evaluation        NSTEMI (non-ST elevated myocardial infarction) (CMS-HCC)- (present on admission)   Assessment & Plan    This is 2/2 decompensated acute systolic CHF  Hx CAD on medical management  Continue plavix, BB, Statin, ARB  Optimize CHF  Cardiology team consulted  Further recommendations per cardiology team        Acute systolic congestive heart failure (CMS-HCC)- (present on admission)   Assessment & Plan    Decompensated  IV diuresis with Lasix, monitor renal function, electrolytes  Continue at home regimen of Irbesartan, carvedilol  Isordil, hydralazine  Cardiology consulted  Further recommendations per cardiology team        Encephalopathy- (present on admission)   Assessment & Plan    In the setting of underlying Dementia  Underlying pneumonia, medical issues contributing  Avoid sedative, narcotics / BZDs as able  Management of underlying medical issues  Optimize hypothyroidism        Pneumonia- (present on admission)   Assessment & Plan    Multifocal, This is aspiration pneumonia  POA  Aspiration precautions, TF via cortrak, diet per SLP recommendations  IV Unasyn for now  Aggressive RT care, BPH        HTN (hypertension)- (present on admission)   Assessment & Plan    Maintain adequate afterload reduction with systolic CHF  irbesartan, carvedilol  Hydralazine, isordil added per cardiology team  Aim SBP < 120        Hypothyroidism- (present on admission)   Assessment & Plan    Persistently elevated TSH  Increased synthroid to 200 mcg  Recheck TSH in 3-4 weeks time        Chronic kidney disease (CKD), stage IV (severe) (CMS-HCC)-  (present on admission)   Assessment & Plan    Monitor renal function / Avoid nephrotoxins and dose medications renally        Pancytopenia (CMS-HCC)- (present on admission)   Assessment & Plan    Anemia evaluation requested  Monitor Hb / Restrictive transfusion strategy  Consider bone marrow biopsy once acute issues resolve        Aortic dissection (CMS-HCC)- (present on admission)   Assessment & Plan    Chronic, outpatient VS follow up        Valvular disease- (present on admission)   Assessment & Plan    Severe AI, mod MR  Afterload reduction, diuresis  Further recommendations per cardiology team        Dysphagia- (present on admission)   Assessment & Plan    Continue SLP evaluation  Diet per SLP recommendations  Continue TFs via cortrak at this time        Dementia- (present on admission)   Assessment & Plan    Avoid sedative, narcotics / BZDs as able        Benign prostatic hyperplasia with lower urinary tract symptoms- (present on admission)   Assessment & Plan    Continue at home regimen of alpha blockers        Vitamin B 12 deficiency- (present on admission)   Assessment & Plan    Continue supplementation        Paroxysmal atrial fibrillation (CMS-HCC)- (present on admission)   Assessment & Plan    History of  Carvedilol  Further recommendations per cardiology team including anticoagulation        HLD (hyperlipidemia)- (present on admission)   Assessment & Plan    Atorvastatin             Reviewed items::  Labs reviewed, Medications reviewed and Radiology images reviewed  Arrington catheter::  No Arrington  DVT prophylaxis pharmacological::  Heparin  DVT prophylaxis - mechanical:  SCDs  Ulcer Prophylaxis::  Not indicated  Antibiotics:  Treating active infection/contamination beyond 24 hours perioperative coverage

## 2018-01-25 NOTE — ASSESSMENT & PLAN NOTE
This is 2/2 decompensated acute systolic CHF  Hx CAD on medical management  Continue plavix, BB, Statin, ARB  Optimize CHF  Cardiology team consulted  Further recommendations per cardiology team

## 2018-01-25 NOTE — THERAPY
"Occupational Therapy Evaluation completed.   Functional Status: Mod A supine > EOB, min A sit to stands, max A EOB > supine, unable to participate in ADL due to AMS Plan of Care: Will benefit from Occupational Therapy 3 times per week  Discharge Recommendations:  Equipment: Will Continue to Assess for Equipment Needs. Post-acute therapy Discharge to a transitional care facility for continued skilled therapy services.    See \"Rehab Therapy-Acute\" Patient Summary Report for complete documentation.    84 y.o. male with h/o dementia, CAD, HTN, CKD, hypothyroid, admitted with SOB, AMS. Dx with PNA, acute respiratory failure, encephalopathy. Per chart, pt resides at [facility]. Pt seen now for OT eval. Pt completed: bed mobility, sit to stands, tooks a few steps forward and back with heavy assist. Unable to engage in ADL due to AMS, pulling at lines. Pt presents with significant confusion, moderate agitation, minimal command following, balance impairment, impaired motor planning, all negatively impacting basic ADL and functional mobility. Baseline unknown, but per ED note 2 months ago, pt was mobilizing without difficulty. Likely way off basline. Acute OT to follow with recommendation for post-acute SNF rehab.     "

## 2018-01-26 PROBLEM — E87.0 HYPERNATREMIA: Status: ACTIVE | Noted: 2018-01-01

## 2018-01-26 NOTE — DISCHARGE PLANNING
Received choice form [facility].  Referral sent to [facility]  At 1225 on 01-26-18.

## 2018-01-26 NOTE — THERAPY
"Speech Language Therapy dysphagia treatment completed.   Functional Status:  Patient seen for therapy this date. Patient with eyes closed but responds to name and requesting water.  Speech is very difficult to understand due to current respiratory status and xerostomia.  Patient with audible upper airway gurgles and congestion, but he is not able to trigger cued cough to clear.  Tongue very dry and xerostomic, and when attempted to oral suction, secretions were so thick and dry, that nothing came through the suction cath.  Oral care done and single ice chips provided to moisten oral cavity and attempt to thin secretions.  RN present.  Was able to give a total of 6 ice chips each resulting in delayed swallow followed by cough.  Was able to deep oral suction X3 with a good amount of thick secretions cleared from posterior pharynx.  RN was able to NT suction X2, again clearing a moderate amount of thick secretions.  Overall upper airway sounds did decrease somewhat, but patient still not able to effectively manage his own secretions or cough independently to clear.  Oral care was provided.  At this time, patient is not safe for PO intake of any kind.  Aggressive oral care is indicated in order to keep oral mucosa moist and minimize build of of thick, dried secretions which can affect overall respiratory status.  SLP will continue to follow for dysphagia therapy as appropriate.  Palliative care is consulted and trying to contact family.    Recommendations: NPO with austyn  Plan of Care: Will benefit from Speech Therapy 3 times per week  Post-Acute Therapy: Discharge to a transitional care facility for continued skilled therapy services--palliative care consult is on board    See \"Rehab Therapy-Acute\" Patient Summary Report for complete documentation.     "

## 2018-01-26 NOTE — HOSPICE
Referral received for hospice. Spoke with pt's son Anibal who is DPOA and next of kin. He is flying in from [place] this evening. Has scheduled conference with hospitalist tonight to discuss transitioning pt to comfort care. Pt meets criteria for hospice. Discharge plan will be for pt to go back to [facility] on routine hospice care. Hospice nurse will meet Anibal at the hospital 1/27/18 around 0800 to review hospice services and sign consents. Will need  to arrange transportation back to his [facility] 1/27/18. Spoke with Maggie wellness nurse at [facility] to arrange for DME delivery prior to pt's discharge. Please call hospice for any questions [phone].

## 2018-01-26 NOTE — PALLIATIVE CARE
Palliative Care follow-up  Ongoing multiple attempts to contact son, Anibal, via phone.  Message left 1/24/18, 1/25/18 and again today 1/26/18.   Initiated Hospice conversation however I have not had the opportunity to follow up as we are both playing phone tag.  Will continue to reach.    11:00am - Return call from Anibal.  Stated that he spoke with Dr. Root x 25 minutes and understands the medical status as being critical.  We discussed comfort measures pending a Hospice evaluation.  Anibal stated understanding.  He will be flying into town today and will plan to meet with Dr. Root tomorrow am before he leaves again to return to [place].  Palliative Care to meet with Anibal 1/27/18 @ 08am.  Hospice choice provided and I received a verbal for [facility].    Updated: Palliative Care, Courtney BRISCOE, Dr. Root, Palliative Care team.      Plan: Return to [facility] when medically stable, recommendation for Hospice.    Thank you for allowing Palliative Care to participate in this patient's care. Please feel free to call [phone] with any questions or concerns.

## 2018-01-26 NOTE — PROGRESS NOTES
Stacie from Lab called with critical result of Troponin 0.90 at 0605. Critical lab result read back to Stacie.   Dr. Gutierrez notified of critical lab result at 0615.  Critical lab result read back by Dr. Gutierrez.

## 2018-01-26 NOTE — PROGRESS NOTES
Pt awake in bed alert and oriented only to self. Pt has cortrak placed at 75 cm secured with tape. Running Fibersource at 65ml/hr. Pt grimacing, pain assumed. Tylenol given per MAR. Pt has soft left and right wrist restraints. Oxy mask on at 4l. RT at bedside performing suctioning with thick yellow tan bloody sputum return. Will continue to monitor.

## 2018-01-26 NOTE — PROGRESS NOTES
Case discussed with son Anibal over the phone  Discussed critical nature of patient's illness with son and poor prognosis  Son is coming here at 7 pm tonight and will talk to us tomorrow  He wants to continue current measures  He is leaning towards hospice care but wants to come and see his father first  At this time he wants to continue current management  In the event of any de escalation he wants no escalation of care (No ICU transfers) and call him to transition to comfort measures  Detailed progress note to follow    Kun Root M.D.  9:12 AM  01/26/18

## 2018-01-26 NOTE — PROGRESS NOTES
Cardiology Progress Note               Author: Castro Soares Date & Time created: 2018  9:28 AM     Consultation for new cardiomyopathy, EF 20 (60 on 17) BNP> 5000      Admitted with altered mental status, dyspnea, cough      History of CAD s/p stent to OM in , ascending type a aortic dissections/p preparing , severe AR, chronic angina, lives in assisted living, memory issues , chronic kidney disease stage III (baseline 2), PAF, thyroid dysfunction, dyslipidemia, hypertension      Labs reviewed  Sodium 147  Creatinine 2  Potassium 4.8  Troponin peaked at 0.9  BNP >5000      BP = 118/52  HR = 90s, A. fib      Echocardiogram 18, EF 20%, global hypokinesis, moderate MR, severe AI (EF 60% on 17)        ROS  Follows simple commnads  Physical Exam   HENT:   Head: Normocephalic.   Eyes: Conjunctivae are normal.   Neck: No JVD present. No thyromegaly present.   Cardiovascular: Normal rate and regular rhythm.    Pulses:       Carotid pulses are 2+ on the right side, and 2+ on the left side.       Radial pulses are 2+ on the right side, and 2+ on the left side.   Pulmonary/Chest: He has no wheezes.   rhonchi   Abdominal: Soft.   Musculoskeletal: He exhibits no edema.   Skin: Skin is warm and dry.       Hemodynamics:  Temp (24hrs), Av.2 °C (99 °F), Min:36.9 °C (98.4 °F), Max:37.5 °C (99.5 °F)  Temperature: 37 °C (98.6 °F)  Pulse  Av.6  Min: 64  Max: 98   Blood Pressure : 125/52     Respiratory:    Respiration: (!) 24, Pulse Oximetry: 95 %, O2 Daily Delivery Respiratory : OxyMask     Given By:: Mask, Given By:: Mouthseal, Work Of Breathing / Effort: Moderate;Shallow  RUL Breath Sounds: Rhonchi;Coarse Crackles, RML Breath Sounds: Rhonchi;Coarse Crackles, RLL Breath Sounds: Rhonchi;Coarse Crackles, ERIC Breath Sounds: Rhonchi;Coarse Crackles, LLL Breath Sounds: Rhonchi;Coarse Crackles  Fluids:     Weight: 71.1 kg (156 lb 12 oz)  GI/Nutrition:  Orders Placed This Encounter   Procedures   •  Diet NPO     Standing Status:   Standing     Number of Occurrences:   1     Order Specific Question:   Restrict to:     Answer:   Sips with Medications [3]     Lab Results:  Recent Labs      01/24/18   0440  01/25/18   0404  01/26/18   0321   WBC  4.7*  5.2  4.4*   RBC  3.36*  3.18*  2.99*   HEMOGLOBIN  10.8*  10.3*  9.7*   HEMATOCRIT  33.4*  31.3*  29.6*   MCV  99.4*  98.4*  99.0*   MCH  32.1  32.4  32.4   MCHC  32.3*  32.9*  32.8*   RDW  53.0*  51.4*  51.8*   PLATELETCT  71*  87*  85*   MPV  12.6  11.9  12.1     Recent Labs      01/24/18   0440  01/25/18   0404  01/26/18   0321   SODIUM  140  145  147*   POTASSIUM  5.3  5.3  4.8   CHLORIDE  113*  117*  119*   CO2  16*  16*  18*   GLUCOSE  125*  150*  121*   BUN  62*  66*  76*   CREATININE  2.22*  2.39*  2.04*   CALCIUM  9.0  9.4  9.0     Recent Labs      01/24/18   0828  01/25/18   0404   INR  1.25*  1.37*     Recent Labs      01/23/18   1041  01/25/18   0404  01/26/18   0321   BNPBTYPENAT  >5000*  >5000*  >5000*     Recent Labs      01/23/18   1041   01/24/18   1238  01/24/18   1601  01/25/18   0404  01/26/18   0321   TROPONINI   --    < >  0.34*  0.40*   --   0.90*   BNPBTYPENAT  >5000*   --    --    --   >5000*  >5000*    < > = values in this interval not displayed.     Recent Labs      01/24/18   0440   TRIGLYCERIDE  77   HDL  32*   LDL  51         Medical Decision Making, by Problem:  Active Hospital Problems    Diagnosis   • Acute respiratory failure with hypoxia (CMS-HCC) [J96.01]   • Acute systolic congestive heart failure (CMS-[facility]) [I50.21]   • NSTEMI (non-ST elevated myocardial infarction) (CMS-[facility]) [I21.4]   • Hyperbilirubinemia [E80.6]   • Pneumonia [J18.9]   • Encephalopathy [G93.40]   • Dysphagia [R13.10]   • Valvular disease [I38]   • Aortic dissection (CMS-HCC) [I71.00]   • Pancytopenia (CMS-HCC) [D61.818]   • Chronic kidney disease (CKD), stage IV (severe) (CMS-HCC) [N18.4]   • Hypothyroidism [E03.9]   • HTN (hypertension) [I10]   • Dementia  [F03.90]   • Benign prostatic hyperplasia with lower urinary tract symptoms [N40.1]   • Vitamin B 12 deficiency [E53.8]   • Paroxysmal atrial fibrillation (CMS-HCC) [I48.0]   • HLD (hyperlipidemia) [E78.5]       Assessment/ Plan:    Consultation for new cardiomyopathy, EF 20% (60% on 5/29/17), medical therapy, on isosorbide/hydralazine, Avapro, Coreg 25 mg BID      Acute decompensated systolic heart failure, BNP on admission > 5000, BNP remains high    Furosemide IV 40 mg BID, may need additional diuretics, will discuss with Dr Conrad ( ct chest 1/24/noted multifocal pneumonitis, moderate right and small left pleural effusion)    On continuous Unasyn IV therapy    I/O =- 1300     PAF, (history of PAF), in NSR with frequent PAC's on Coreg 25 mg BID      Severe AI, continue with afterload reduction    Being treated for pneumonia    Elevated troponin, peaked at 0.9, history of CAD, continue with medical therapy, on Lipitor 20 mg ,   carvedilol 25, Plavix 75 mg       CKG stage III, creatinine = 2    Will continue with conservative therapy, plan for family conference in AM  in respect to hospice care      Reviewed items::  Medications reviewed and Labs reviewed

## 2018-01-26 NOTE — DISCHARGE PLANNING
Therapies are recommending SNF - SW will request order and follow up for choice.     Pt has PASRR on file, #: [id number]     UPDATE: Per Palliative RN Michael, family has decided on comfort care/hospice. Choice form completed and faxed to [facility] for [facility] as requested by Michael.

## 2018-01-26 NOTE — DISCHARGE PLANNING
SW to follow up tomorrow morning regarding hospice acceptance and transportation back to [place] [facility] [place].

## 2018-01-27 PROBLEM — R41.82 ALTERED MENTAL STATUS: Status: ACTIVE | Noted: 2018-01-01

## 2018-01-27 NOTE — HOSPICE
Met with pt and son Anibal. Discussed hospice services and introduced members of the interdisciplinary team. Pt appeared to comprehend the conversation and was able to nod and gesture that he wanted the tubes and lines out. Hospice consents obtained by son who has DPOA. Will transition pt to comfort care. Plan for pt to be dc back to [facility] via ambulance. Report called to Maggie [facility] [place].

## 2018-01-27 NOTE — PROGRESS NOTES
[facility]ist Progress Note    Date of Service: 2018    Chief Complaint  84 y.o. male admitted 2018 with cough and ALOC and admitting diagnosis of pneumonia.     Interval Problem Update  Patient seen and evaluated on rounds  He remains confused, pulling on catheters / Lines now persistent use of restraints  More somnolent compared to yesterday.   Pneumonia evident, aspiration related  CHF seen, cardiology consulted, appreciate input  Multiple co morbidities, poor prognosis  Requested palliative team consultation to address goals of care with family  Would recommend hospice care in light of underlying co morbidities and poor prognosis    Discussed case with son over the telephone. Son is coming to the hospital tonight. He would consider initiation of hospice care / comfort measures tomorrow. At this time son would want to continue current measures. In the event of any de escalation consider transitioning to comfort measures while maintaining contact with him. No ICU transfers. No central line placements. No vasopressors per son.     Consultants/Specialty  Cardiology    Disposition  Pending resolution of acute medical issues  Anticipate post acute placement        Review of Systems   Unable to perform ROS: Mental acuity      Physical Exam  Laboratory/Imaging   Hemodynamics  Temp (24hrs), Av.5 °C (99.5 °F), Min:36.9 °C (98.4 °F), Max:38.2 °C (100.8 °F)   Temperature: 37.8 °C (100 °F)  Pulse  Av.4  Min: 64  Max: 98    Blood Pressure : 132/50      Respiratory      Respiration: (!) 26, Pulse Oximetry: 92 %, O2 Daily Delivery Respiratory : Silicone Nasal Cannula     Given By:: Mask, Given By:: Mouthseal, Work Of Breathing / Effort: Moderate  RUL Breath Sounds: Rhonchi;Coarse Crackles, RML Breath Sounds: Rhonchi;Coarse Crackles, RLL Breath Sounds: Rhonchi;Coarse Crackles, ERIC Breath Sounds: Rhonchi;Coarse Crackles, LLL Breath Sounds: Rhonchi;Coarse Crackles    Fluids    Intake/Output Summary (Last 24  hours) at 01/26/18 1730  Last data filed at 01/26/18 1544   Gross per 24 hour   Intake             2130 ml   Output             1450 ml   Net              680 ml       Nutrition  Orders Placed This Encounter   Procedures   • Diet NPO     Standing Status:   Standing     Number of Occurrences:   1     Order Specific Question:   Restrict to:     Answer:   Sips with Medications [3]     Physical Exam   Constitutional: He appears well-developed and well-nourished. No distress.   HENT:   Head: Normocephalic.   Mouth/Throat: Oropharynx is clear and moist. No oropharyngeal exudate.   Eyes: Conjunctivae are normal. Pupils are equal, round, and reactive to light. No scleral icterus.   Neck: JVD present.   Cardiovascular: Normal rate and regular rhythm.    Murmur heard.  Pulmonary/Chest: No stridor. No respiratory distress. He has no wheezes. He has rales.   Extensive rhonchi b/l. Poor clearence of respiratory secretions. Poor cough.    Abdominal: Soft. Bowel sounds are normal. He exhibits no distension. There is no tenderness. There is no rebound.   Musculoskeletal: He exhibits edema (Minimal). He exhibits no tenderness.   Neurological: He is alert. No cranial nerve deficit.   Confused. Following commands. Moving all extremities.    Skin: Skin is warm and dry. He is not diaphoretic.       Recent Labs      01/24/18   0440  01/25/18   0404  01/26/18   0321   WBC  4.7*  5.2  4.4*   RBC  3.36*  3.18*  2.99*   HEMOGLOBIN  10.8*  10.3*  9.7*   HEMATOCRIT  33.4*  31.3*  29.6*   MCV  99.4*  98.4*  99.0*   MCH  32.1  32.4  32.4   MCHC  32.3*  32.9*  32.8*   RDW  53.0*  51.4*  51.8*   PLATELETCT  71*  87*  85*   MPV  12.6  11.9  12.1     Recent Labs      01/24/18   0440  01/25/18   0404  01/26/18   0321   SODIUM  140  145  147*   POTASSIUM  5.3  5.3  4.8   CHLORIDE  113*  117*  119*   CO2  16*  16*  18*   GLUCOSE  125*  150*  121*   BUN  62*  66*  76*   CREATININE  2.22*  2.39*  2.04*   CALCIUM  9.0  9.4  9.0     Recent Labs       01/24/18   0828  01/25/18   0404   INR  1.25*  1.37*     Recent Labs      01/25/18   0404  01/26/18   0321   BNPBTYPENAT  >5000*  >5000*     Recent Labs      01/24/18   0440   TRIGLYCERIDE  77   HDL  32*   LDL  51          Assessment/Plan     Acute respiratory failure with hypoxia (CMS-HCC)- (present on admission)   Assessment & Plan    2/2 Decompensated CHF and Pneumonia  Continue management of pneumonia and CHF  Aggressive RT care  Q4 BD regimen  CPT / BPH QID        Hypernatremia- (present on admission)   Assessment & Plan    Gentle D5 with diuresis  Monitor Na levels        NSTEMI (non-ST elevated myocardial infarction) (CMS-HCC)- (present on admission)   Assessment & Plan    This is 2/2 decompensated acute systolic CHF  Hx CAD on medical management  Continue plavix, BB, Statin, ARB  Optimize CHF  Cardiology team consulted  Further recommendations per cardiology team        Acute systolic congestive heart failure (CMS-HCC)- (present on admission)   Assessment & Plan    Decompensated  IV diuresis with Lasix, monitor renal function, electrolytes  Continue at home regimen of Irbesartan, carvedilol  Isordil, hydralazine  Cardiology consulted  Further recommendations per cardiology team        Encephalopathy- (present on admission)   Assessment & Plan    In the setting of underlying Dementia  Underlying pneumonia, medical issues contributing  Avoid sedative, narcotics / BZDs as able  Management of underlying medical issues  Optimize hypothyroidism        Pneumonia- (present on admission)   Assessment & Plan    Multifocal, This is aspiration pneumonia  POA  Aspiration precautions, TF via cortrak, diet per SLP recommendations  IV Unasyn for now  Aggressive RT care, BPH        HTN (hypertension)- (present on admission)   Assessment & Plan    Maintain adequate afterload reduction with systolic CHF  irbesartan, carvedilol  Hydralazine, isordil added per cardiology team  Aim SBP < 120        Hypothyroidism- (present on  admission)   Assessment & Plan    Persistently elevated TSH  Increased synthroid to 200 mcg  Recheck TSH in 3-4 weeks time        Hyperbilirubinemia- (present on admission)   Assessment & Plan    Suspect this is congestive hepatopathy related  Improving with diuresis        Chronic kidney disease (CKD), stage IV (severe) (CMS-HCC)- (present on admission)   Assessment & Plan    Monitor renal function / Avoid nephrotoxins and dose medications renally        Pancytopenia (CMS-HCC)- (present on admission)   Assessment & Plan    Anemia evaluation requested  Monitor Hb / Restrictive transfusion strategy  Consider bone marrow biopsy once acute issues resolve        Aortic dissection (CMS-HCC)- (present on admission)   Assessment & Plan    Chronic, outpatient VS follow up        Valvular disease- (present on admission)   Assessment & Plan    Severe AI, mod MR  Afterload reduction, diuresis  Further recommendations per cardiology team        Dysphagia- (present on admission)   Assessment & Plan    Continue SLP evaluation  Diet per SLP recommendations  Continue TFs via cortrak at this time        Dementia- (present on admission)   Assessment & Plan    Avoid sedative, narcotics / BZDs as able        Benign prostatic hyperplasia with lower urinary tract symptoms- (present on admission)   Assessment & Plan    Continue at home regimen of alpha blockers        Vitamin B 12 deficiency- (present on admission)   Assessment & Plan    Continue supplementation        Paroxysmal atrial fibrillation (CMS-HCC)- (present on admission)   Assessment & Plan    History of  Carvedilol  Further recommendations per cardiology team including anticoagulation        HLD (hyperlipidemia)- (present on admission)   Assessment & Plan    Atorvastatin             Reviewed items::  Labs reviewed, Medications reviewed and Radiology images reviewed  Arrington catheter::  No Arrington  DVT prophylaxis pharmacological::  Heparin  DVT prophylaxis - mechanical:   SCDs  Ulcer Prophylaxis::  Not indicated  Antibiotics:  Treating active infection/contamination beyond 24 hours perioperative coverage

## 2018-01-27 NOTE — PROGRESS NOTES
Cardiology Progress Note               Author: Castro Soares Date & Time created: 2018  8:13 AM     Consultation for new cardiomyopathy, EF 20 (60 on 17) BNP> 5000      Admitted with altered mental status, dyspnea, cough      History of CAD s/p stent to OM in , ascending type a aortic dissections/p preparing , severe AR, chronic angina, lives in assisted living, memory issues , chronic kidney disease stage III (baseline 2), PAF, thyroid dysfunction, dyslipidemia, hypertension      Labs reviewed  Sodium 147  Creatinine 1.86  Potassium 4.8  Troponin peaked at 0.9  BNP >5000      BP = 118/52  HR = 90s NSR      Echocardiogram 18, EF 20%, global hypokinesis, moderate MR, severe AI (EF 60% on 17)        ROS    Follows simple commnads  Physical Exam   HENT:   Head: Normocephalic.   Eyes: Conjunctivae are normal.   Neck: No JVD present. No thyromegaly present.   Cardiovascular: Normal rate and regular rhythm.    Pulses:       Carotid pulses are 2+ on the right side, and 2+ on the left side.       Radial pulses are 2+ on the right side, and 2+ on the left side.   Pulmonary/Chest: He has no wheezes.   rhonchi   Abdominal: Soft.   Musculoskeletal: He exhibits no edema.   Skin: Skin is warm and dry.       Hemodynamics:  Temp (24hrs), Av.4 °C (99.4 °F), Min:36.6 °C (97.8 °F), Max:38.1 °C (100.6 °F)  Temperature: 36.6 °C (97.8 °F)  Pulse  Av.5  Min: 61  Max: 98   Blood Pressure : 113/55     Respiratory:    Respiration: 18, Pulse Oximetry: 95 %, O2 Daily Delivery Respiratory : Silicone Nasal Cannula     Given By:: Mask, Given By:: Mouthseal, Work Of Breathing / Effort: Moderate  RUL Breath Sounds: Coarse Crackles, RML Breath Sounds: Coarse Crackles, RLL Breath Sounds: Coarse Crackles, ERIC Breath Sounds: Coarse Crackles, LLL Breath Sounds: Coarse Crackles  Fluids:     Weight: 68.9 kg (151 lb 14.4 oz)  GI/Nutrition:  Orders Placed This Encounter   Procedures   • Diet NPO     Standing Status:    Standing     Number of Occurrences:   1     Order Specific Question:   Restrict to:     Answer:   Sips with Medications [3]     Lab Results:  Recent Labs      01/25/18   0404  01/26/18   0321  01/27/18   0214   WBC  5.2  4.4*  3.9*   RBC  3.18*  2.99*  2.87*   HEMOGLOBIN  10.3*  9.7*  9.3*   HEMATOCRIT  31.3*  29.6*  28.7*   MCV  98.4*  99.0*  100.0*   MCH  32.4  32.4  32.4   MCHC  32.9*  32.8*  32.4*   RDW  51.4*  51.8*  51.3*   PLATELETCT  87*  85*  93*   MPV  11.9  12.1  12.4     Recent Labs      01/25/18   0404  01/26/18   0321  01/27/18   0214   SODIUM  145  147*  147*   POTASSIUM  5.3  4.8  4.6   CHLORIDE  117*  119*  117*   CO2  16*  18*  23   GLUCOSE  150*  121*  152*   BUN  66*  76*  78*   CREATININE  2.39*  2.04*  1.86*   CALCIUM  9.4  9.0  9.1     Recent Labs      01/24/18   0828  01/25/18   0404   INR  1.25*  1.37*     Recent Labs      01/25/18   0404  01/26/18   0321  01/27/18   0214   BNPBTYPENAT  >5000*  >5000*  >5000*     Recent Labs      01/24/18   1238  01/24/18   1601  01/25/18   0404  01/26/18   0321  01/27/18   0214   TROPONINI  0.34*  0.40*   --   0.90*   --    BNPBTYPENAT   --    --   >5000*  >5000*  >5000*             Medical Decision Making, by Problem:  Active Hospital Problems    Diagnosis   • Acute respiratory failure with hypoxia (CMS-HCC) [J96.01]   • Acute systolic congestive heart failure (CMS-[facility]) [I50.21]   • NSTEMI (non-ST elevated myocardial infarction) (CMS-[facility]) [I21.4]   • Hyperbilirubinemia [E80.6]   • Pneumonia [J18.9]   • Encephalopathy [G93.40]   • Dysphagia [R13.10]   • Valvular disease [I38]   • Aortic dissection (CMS-HCC) [I71.00]   • Pancytopenia (CMS-HCC) [D61.818]   • Chronic kidney disease (CKD), stage IV (severe) (CMS-HCC) [N18.4]   • Hypothyroidism [E03.9]   • HTN (hypertension) [I10]   • Dementia [F03.90]   • Benign prostatic hyperplasia with lower urinary tract symptoms [N40.1]   • Vitamin B 12 deficiency [E53.8]   • Paroxysmal atrial fibrillation (CMS-HCC)  [I48.0]   • HLD (hyperlipidemia) [E78.5]       Assessment/ Plan:      Comfort care     Cardiology will sign off         Consultation for new cardiomyopathy, EF 20% (60% on 5/29/17), medical therapy, on isosorbide/hydralazine, Avapro, Coreg 25 mg BID      Acute decompensated systolic heart failure, BNP on admission > 5000, BNP remains high    Furosemide IV 30 mg BID,  ( ct chest 1/24/noted multifocal pneumonitis, moderate right and small left pleural effusion)      I/O =- 1900     PAF, (history of PAF), in NSR with frequent PAC's on Coreg 25 mg BID      Severe AI, continue with afterload reduction    Being treated for pneumonia, on Unasyn    Elevated troponin, peaked at 0.9, history of CAD, continue with medical therapy, on Lipitor 20 mg ,   carvedilol 25, Plavix 75 mg       CKG stage III, creatinine = 1.86    Will continue with conservative therapy, plan for family conference today  in respect to hospice care      Reviewed items::  Medications reviewed and Labs reviewed

## 2018-01-27 NOTE — PROGRESS NOTES
JAKI at bedside to take pt to [facility] in [place]. Copy of ID taken, see chart. Report given to JAKI and pt educated that he would be going home. All questions answered. JAKI sent with POLST and D/C instructions. Pt left with clothes and with a silver with turquoise on it wrist watch on L wrist.

## 2018-01-27 NOTE — HOSPICE
Spoke with Fransisco  of [facility] the [facility]. They have found an open apartment for the pt [id number]. DME ordered for re-delivery. Courtney with care coordination setting up ambulance transport for after 2pm. Primary nurse Joanie and son Anibal updated on plan of care.

## 2018-01-27 NOTE — DISCHARGE PLANNING
Per [facility] Courtney request, transport has been arranged for patient to transfer to the [facility] today at 1500 via [facility].  [facility] Courtney has been advised of transport time.

## 2018-01-27 NOTE — DISCHARGE PLANNING
SW received call from Nat with [facility]. They have accepted pt and all DME has been delivered. Pt will require [facility] transport home. Transport request and PCS forms faxed to MELANI Xie.
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
## 2018-01-27 NOTE — CARE PLAN
Problem: Communication  Goal: The ability to communicate needs accurately and effectively will improve  Outcome: PROGRESSING AS EXPECTED  Patient unable to communicate, 2 point wrist restraint. Frequent monitoring, room near nurses station, falls precautions, bed/chair alarm     Problem: Discharge Barriers/Planning  Goal: Patient's continuum of care needs will be met  Outcome: PROGRESSING AS EXPECTED  Will likely discharge to hospice care    Problem: Respiratory:  Goal: Respiratory status will improve  Outcome: PROGRESSING AS EXPECTED  Scheduled nebulizers, oxygen as needed.

## 2018-01-27 NOTE — DISCHARGE PLANNING
RACHNA received another call from Nat Godfrey is able to accommodate the pt in a different room ([address]). DME will be delivered by 2:00. RACHNA notified MEL[facility] Xie via voicemail and requested she schedule transport for after 2:00.

## 2018-01-27 NOTE — CARE PLAN
Problem: Safety  Goal: Will remain free from injury  Outcome: PROGRESSING AS EXPECTED  Bed locked and in lowest position. Bed alarm on. Treaded socks. Call light and belongings within reach. Patient educated to call for assistance. Pt verbalized understanding. Hourly rounding in place.      Problem: Skin Integrity  Goal: Risk for impaired skin integrity will decrease  Outcome: PROGRESSING AS EXPECTED  Q2hr hour turns preformed, frequent cleansing of skin, skin protectant used. Frequent monitoring of skin integrity.

## 2018-01-27 NOTE — PROGRESS NOTES
Bedside report received by héctor Molina. Patient lying in bed watching TV at this time. POC discussed, no verbalized understanding, pt unresponsive. All safety measures in place.

## 2018-01-27 NOTE — DISCHARGE INSTRUCTIONS
Discharge Instructions    Discharged to home by medical transportation with escort. Discharged via ambulance, hospital escort: Yes.  Special equipment needed: Not Applicable    Be sure to schedule a follow-up appointment with your primary care doctor or any specialists as instructed.     Discharge Plan:   Diet Plan: Discussed  Activity Level: Discussed  Confirmed Follow up Appointment: No (Comments) (pt going home on hospice)  Confirmed Symptoms Management: Discussed  Medication Reconciliation Updated: Yes    I understand that a diet low in cholesterol, fat, and sodium is recommended for good health. Unless I have been given specific instructions below for another diet, I accept this instruction as my diet prescription.   Other diet: Dysphagia 1 nectar thick    Special Instructions:   HF Patient Discharge Instructions  · Monitor your weight daily, and maintain a weight chart, to track your weight changes.   · Activity as tolerated, unless your Doctor has ordered otherwise. Other activity order: .  · Follow a low fat, low cholesterol, low salt diet unless instructed otherwise by your Doctor. Read the labels on the back of food products and track your intake of fat, cholesterol and salt.   · Fluid Restriction No. If a Fluid Restriction has been ordered by your Doctor, measure fluids with a measuring cup to ensure that you are not exceeding the restriction.   · No smoking.  · Oxygen No. If your Doctor has ordered that you wear Oxygen at home, it is important to wear it as ordered.  · Did you receive an explanation from staff on the importance of taking each of your medications and why it is necessary to keep taking them unless your doctor says to stop? Yes  · Were all of your questions answered about how to manage your heart failure and what to do if you have increased signs and symptoms after you go home? Yes  · Do you feel like your heart failure care team involved you in the care treatment plan and allowed you to  make decisions regarding your care while in the hospital and addressed any discharge needs you might have? Yes    See the educational handout provided at discharge for more information on monitoring your daily weight, activity and diet. This also explains more about Heart Failure, symptoms of a flare-up and some of the tests that you have undergone.     Warning Signs of a Flare-Up include:  · Swelling in the ankles or lower legs.  · Shortness of breath, while at rest, or while doing normal activities.   · Shortness of breath at night when in bed, or coughing in bed.   · Requiring more pillows to sleep at night, or needing to sit up at night to sleep.  · Feeling weak, dizzy or fatigued.     When to call your Doctor:  · Call [facility] seven days a week from 8:00 a.m. to 8:00 p.m. for medical questions [phone].  · Call your Primary Care Physician or Cardiologist if:   1. You experience any pain radiating to your jaw or neck.  2. You have any difficulty breathing.  3. You experience weight gain of 3 lbs in a day or 5 lbs in a week.   4. You feel any palpitations or irregular heartbeats.  5. You become dizzy or lose consciousness.   If you have had an angiogram or had a pacemaker or AICD placed, and experience:  1. Bleeding, drainage or swelling at the surgical / puncture site.  2. Fever greater than 100.0 F  3. Shock from internal defibrillator.  4. Cool and / or numb extremities.      · Is patient discharged on Warfarin / Coumadin?   No     Hospice  Hospice is a service that is designed to provide people who are terminally ill and their families with medical, spiritual, and psychological support. Its aim is to improve your quality of life by keeping you as alert and comfortable as possible. Hospice is performed by a team of health care professionals and volunteers who:  · Help keep you comfortable. Hospice can be provided in your home or in a homelike setting. The hospice staff works with your family  and friends to help meet your needs. You will enjoy the support of loved ones by receiving much of your basic care from family and friends.  · Provide pain relief and manage your symptoms. The staff supply all necessary medicines and equipment.  · Provide companionship when you are alone.  · Allow you and your family to rest. They may do light housekeeping, prepare meals, and run errands.  · Provide counseling. They will make sure your emotional, spiritual, and social needs and those of your family are being met.  · Provide spiritual care. Spiritual care is individualized to meet your needs and your family's needs. It may involve helping you look at what death means to you, say goodbye, or perform a specific Anabaptist ceremony or ritual.  Hospice teams often include:  · A nurse.  · A doctor.  · Social workers.  · Worship leaders (such as a ).  · Trained volunteers.  WHEN SHOULD HOSPICE CARE BEGIN?  Most people who use hospice are believed to have fewer than 6 months to live. Your family and health care providers can help you decide when hospice services should begin. If your condition improves, you may discontinue the program.  WHAT SHOULD I CONSIDER BEFORE SELECTING A PROGRAM?  Most hospice programs are run by nonprofit, independent organizations. Some are affiliated with hospitals, nursing homes, or home health care agencies. Hospice programs can take place in the home or at a hospice center, hospital, or skilled nursing facility. When choosing a hospice program, ask the following questions:  · What services are available to me?  · What services are offered to my loved ones?  · How involved are my loved ones?  · How involved is my health care provider?  · Who makes up the hospice care team? How are they trained or screened?  · How will my pain and symptoms be managed?  · If my circumstances change, can the services be provided in a different setting, such as my home or in the hospital?  · Is the program  reviewed and licensed by the state or certified in some other way?  WHERE CAN I LEARN MORE ABOUT HOSPICE?  You can learn about existing hospice programs in your area from your health care providers. You can also read more about hospice online. The websites of the following organizations contain helpful information:  · The National Hospice and Palliative Care Organization (NHPCO).  · The Hospice Association of [place] (HAA).  · The Hospice Education [place].  · The American Cancer Society (ACS).  · Hospice Net.     This information is not intended to replace advice given to you by your health care provider. Make sure you discuss any questions you have with your health care provider.     Document Released: 04/05/2005 Document Revised: 12/23/2014 Document Reviewed: 10/28/2014  [employer] Interactive Patient Education ©2016 [employer]    Depression / Suicide Risk    As you are discharged from this [facility] facility, it is important to learn how to keep safe from harming yourself.    Recognize the warning signs:  · Abrupt changes in personality, positive or negative- including increase in energy   · Giving away possessions  · Change in eating patterns- significant weight changes-  positive or negative  · Change in sleeping patterns- unable to sleep or sleeping all the time   · Unwillingness or inability to communicate  · Depression  · Unusual sadness, discouragement and loneliness  · Talk of wanting to die  · Neglect of personal appearance   · Rebelliousness- reckless behavior  · Withdrawal from people/activities they love  · Confusion- inability to concentrate     If you or a loved one observes any of these behaviors or has concerns about self-harm, here's what you can do:  · Talk about it- your feelings and reasons for harming yourself  · Remove any means that you might use to hurt yourself (examples: pills, rope, extension cords, firearm)  · Get professional help from the community (Mental Health, Substance  Abuse, psychological counseling)  · Do not be alone:Call your Safe Contact- someone whom you trust who will be there for you.  · Call your local CRISIS HOTLINE 032-8164 or 469-828-0380  · Call your local Children's Mobile Crisis Response Team [place] (017) 445-6256 or www.DonorsPlay  · Call the toll free National Suicide Prevention Hotlines   · National Suicide Prevention Lifeline 753-319-KCGI (0231)  · National Hope Line Network 800-SUICIDE (929-3503)

## 2018-01-27 NOTE — PROGRESS NOTES
Bedside report received from night shift RN. Assumed care. Pt is A&Oxself. Pt is in bed. Pt has call light within reach, bed is in lowest position and bed alarm is on. All fall precautions in place. Pt has non-slip socks on, and appropriate signs are on the door.

## 2018-01-27 NOTE — PROGRESS NOTES
During oral care noticed dry skin on left side of tongue. Pt shows signs of discomfort during oral care. Will continue to do oral care q 2 hrs.

## 2018-01-28 NOTE — PROGRESS NOTES
Late note entry:    Son Anibal at bedside this am.     We discussed patient's underlying illness, encephalopathy with inability to make his medical decisions, underlying POC, burden / complications of medical management, medical prognosis in light of underlying co morbidities which is anticipate to be poor. At this time we tried to establish what patient wishes would have been in the setting of this underlying illness and what he would have his medical team do. Per son patient would like to pass away with diginity at this time and wish for hospice care. Son would like to stop all medical interventions, life sustaining medical therapies and transition code status to comfort measures and arrange for hospice care at his senior care.     Subsequently code status transitioned to comfort measures only. Orders for comfort measures placed and discussed with nursing staff.     Hospice referral in place. Plan for hospice placement later today.     We completed a POLST form to reflect these wishes.     Time spend of advance care planning is 45 minutes which is in addition to time spend on discharge today. Billing [id number].    Kun Root M.D.  01/27/18  6:27 PM

## 2018-01-28 NOTE — DISCHARGE SUMMARY
C O N F I D E N T I A L I N F O R M A T I O N   -------------------------------------------------------------------------------------------------------------------   DISCHARGE SUMMARY    Patient ID:  Erica Fisher  [id number]  84 y.o.male  3/17/1933    Admit date: 1/23/2018    Discharge date and time: 01/27/18    Admitting Physician: River Stevenson M.D.    Discharge Physician: Kun Root    CODE STATUS: Comfort measures on discharge    Admission Diagnoses:   Pneumonia  Altered mental status    Discharge Diagnoses:     End of life care    Comfort measures only code status    Acute respiratory failure with hypoxia (CMS-[facility])    Pneumonia    Encephalopathy    Acute systolic congestive heart failure (CMS-[facility])    NSTEMI (non-ST elevated myocardial infarction) (CMS-[facility])    Hypernatremia    HLD (hyperlipidemia)    Paroxysmal atrial fibrillation (CMS-[facility])    Vitamin B 12 deficiency    Benign prostatic hyperplasia with lower urinary tract symptoms    Dementia    Dysphagia    Valvular disease    Aortic dissection (CMS-[facility])    Pancytopenia (CMS-[facility])    Chronic kidney disease (CKD), stage IV (severe) (CMS-[facility])    Hyperbilirubinemia    Hypothyroidism    HTN (hypertension)    Altered mental status      Admission Condition: poor    Discharged Condition: poor    Indication for Admission: ALOC / SOB    Hospital Course: This is a 84 years old male admitted on January 23, 2018 with encephalopathy, shortness of breath. Patient was noted to have acute hypoxemic respiratory failure secondary to decompensated systolic congestive heart failure, new in onset and pneumonia. In addition he was noted to have non-ST elevation MI. Cardiology team was consulted for underlying cardiac comorbidities for which they recommended ongoing medical management. Patient continued to have persistent encephalopathy during the hospital stay in the setting of underlying known dementia confounded by underlying pneumonia and ongoing metabolic abnormalities.  Pneumonia was presumed secondary to aspiration. Patient was managed with intravenous antibiotics during hospital course. Tube feeding was initiated in the setting of underlying dysphagia. Despite aggressive medical management patient failed to improve. Poor prognosis anticipated in the setting of advanced age and multiple comorbidities. Case was discussed with the patient's son on January 27, 2018. Code status was transitioned to comfort measures. Patient at this point in time is discharge back to his assisted living facility with hospice care. POLST completed prior to discharge. Hospice team to take over symptomatic management post discharge.    Consults: cardiology and Palliative team    Significant Studies:   DX-CHEST-LIMITED (1 VIEW)   Final Result      Stable cardiomegaly and pulmonary edema.      DX-ABDOMEN FOR TUBE PLACEMENT   Final Result         1.  Nonspecific bowel gas pattern.   2.  Dobbhoff tube tip terminates overlying the expected location of the gastric body.   3.  Cardiomegaly   4.  Changes of pulmonary edema      CT-CHEST (THORAX) W/O   Final Result      Patchy parenchymal consolidation within both lungs predominantly within the upper lobes and right lower lobe consistent with multifocal pneumonitis/possible pneumonia.   Moderate right and small left pleural effusion similar compressive atelectasis both lower lobes.   Postoperative changes involving the descending aorta and aortic valve. The proximal aortic arch at the distal margin of the aortic graft measures 4.9 cm as compared to 4.5 cm previously.   Chronic thoracic and abdominal aortic dissection.   Enlarged mediastinal lymph nodes since previous examination.   Right renal cyst incompletely imaged and slightly larger than previous.   Anasarca.            ECHOCARDIOGRAM COMP W/O CONT   Final Result      DX-CHEST-LIMITED (1 VIEW)   Final Result      1.  Mild worsening hypoinflation.   2.  No other significant change from prior exam.         EVANGELISTA  VENOUS DUPLEX - DVT ([facility] [place] [facility] Only)   Final Result      DX-ABDOMEN FOR TUBE PLACEMENT   Final Result      Enteric tube projects over the stomach.      CT-HEAD W/O   Final Result      No acute intracranial abnormality is identified.      Atrophy      There are extensive periventricular and subcortical white matter changes present.  This finding is nonspecific and could be from previous small vessel ischemia, demyelination, or gliosis.      Foci of air within the cavernous sinuses may be related to IV line placement.      Mucosal thickening in the paranasal sinuses.      DX-CHEST-PORTABLE (1 VIEW)   Final Result      Hazy bilateral airspace opacities may represent pulmonary edema or multifocal pneumonia.      Stable cardiomegaly.      Atherosclerotic plaque.          Procedures Performed While Hospitalized: None    Operations During Hospitalization: None    Disposition: [facility] with hospice care    Patient Instructions: Given  Activity: activity as tolerated  Wound Care: Per hospice team  Diet:   Orders Placed This Encounter   Procedures   • Diet Order     Standing Status:   Standing     Number of Occurrences:   1     Order Specific Question:   Diet:     Answer:   Regular [1]     Order Specific Question:   Texture/Fiber modifications:     Answer:   Dysphagia 3(Mechanical Soft)specify fluid consistency(question 6) [3]     Order Specific Question:   Consistency/Fluid modifications:     Answer:   Nectar Thick [2]         Medications at discharge:  There are no discharge medications for this patient.       Opioid prescription history checked: N/A. None prescribed.     Time spend preparing discharge: 31 minutes. This included face to face with the patient, medication reconciliation, care co ordination with RN involved in patient care and discussion and co ordination with case management. Time spend on discharge is in addition to time spend on advance care planning of 45 minutes as noted in separate progress  note from today by me.     Signed:  Kun Root  1/27/2018  6:27 PM

## 2018-01-30 NOTE — PROGRESS NOTES
1/30/18  CM post discharge outreach call to patient's son Anibal.  Son is POA.  Per notes in Epic patient was discharged to [facility] with hospice care.  Patient was scheduled at post discharge clinic on 1/31/18.  CM left message for son requesting return call to  at [phone].

## 2018-02-01 VITALS
SYSTOLIC BLOOD PRESSURE: 120 MMHG | RESPIRATION RATE: 28 BRPM | HEART RATE: 76 BPM | OXYGEN SATURATION: 78 % | DIASTOLIC BLOOD PRESSURE: 40 MMHG

## 2018-02-01 ASSESSMENT — ENCOUNTER SYMPTOMS: SHORTNESS OF BREATH: 1

## 2019-07-14 NOTE — MR AVS SNAPSHOT
Erica Fisher   2017 3:00 PM   Office Visit   MRN: [id number]    Department:  [facility]   Dept Phone:  [phone]    Description:  Male : 3/17/1933   Provider:  Clint Jesus PA-C           Reason for Visit     Hand Injury L hand/ happened 4days ago/ swollen/ bruise      Allergies as of 2017     Allergen Noted Reactions    Colchicine 2014   Diarrhea, Vomiting    Diarrhea, n/v    Ibuprofen 2011         You were diagnosed with     Fall, subsequent encounter   [720217]       Hand pain, left   [700830]       Infected skin tear   [152577]       CKD (chronic kidney disease), stage V (CMS-[facility])   [759562]         Vital Signs     Blood Pressure Pulse Temperature Respirations Weight Oxygen Saturation    140/52 mmHg 62 37.3 °C (99.2 °F) 14 74.844 kg (165 lb) 94%    Smoking Status                   Former Smoker           Basic Information     Date Of Birth Sex Race Ethnicity Preferred Language    3/17/1933 Male White Non- English      Your appointments     May 25, 2017 10:45 AM   Adult Draw/Collection with [facility] [place]   [facility] [place] (--)    [address]  [place] [address]   [phone]            Alber 15, 2017  1:20 PM   Established Patient with Evelin Mccray M.D.   [address]  [place] [address] [phone]           You will be receiving a confirmation call a few days before your appointment from our automated call confirmation system.            2017 10:00 AM   Established Patient with Sin Park M.D.   [facility] ([address] [place] [address])    [address] [place] [address] [address]  [place] [address] [phone]           You will be receiving a confirmation call a few days before your appointment from our automated call confirmation system.            Sep 01, 2017 10:00 AM   Follow Up Visit with Sin Park M.D.   [facility] ([address] [place] [address])    [address] [facility]  [facility]  [place] [id number] [phone]           You will be receiving a confirmation call a few days before your appointment from our automated call confirmation system.              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 Low  Unknown - Present    Essential hypertension I10 High  Unknown - Present    Gouty arthritis M10.9 Low  11/20/2012 - Present    Angina effort (CMS-HCC) I20.8 High  12/4/2012 - Present    Aortic regurgitation (Chronic) I35.1 Medium  Unknown - Present    Mitral regurgitation (Chronic) I34.0 Medium  Unknown - Present    Paroxysmal atrial fibrillation (CMS-HCC) (Chronic) I48.0 Medium  Unknown - Present    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 Medium  2/15/2013 - Present    Vitamin D deficiency disease E55.9 Low  Unknown - Present    Stented coronary artery Z95.5   4/7/2014 - Present    Gout M10.9 Low  2/3/2015 - Present    Pulmonary HTN (CMS-HCC) I27.2 Low  5/11/2015 - Present    MEDICAL HOME  Low  5/13/2015 - Present    Pernicious anemia D51.0 Low  6/22/2015 - Present    Vitamin B 12 deficiency E53.8 Low  4/29/2016 - Present    Bradycardia R00.1 High  5/5/2016 - Present    Atherosclerosis of native coronary artery of native heart with angina pectoris (CMS-HCC) I25.119   7/15/2016 - Present    Chronic kidney disease, stage IV (severe) (CMS-HCC) N18.4   11/11/2016 - Present    Secondary hyperparathyroidism of renal origin (CMS-HCC) N25.81   11/11/2016 - Present    Benign prostatic hyperplasia with lower urinary tract symptoms N40.1   11/11/2016 - Present     Electroencephalogram (EEG) abnormality without seizure R94.01   Unknown - Present      Health Maintenance        Date Due Completion Dates    IMM PNEUMOCOCCAL 65+ (ADULT) HIGH/HIGHEST RISK SERIES (2 of 2 - PPSV23) 2/13/2017 12/19/2016    IMM DTaP/Tdap/Td Vaccine (1 - Tdap) 12/1/2017 (Originally 3/17/1952) ---    IMM ZOSTER VACCINE 12/1/2017 (Originally 3/17/1993) ---    COLONOSCOPY 6/6/2020 6/6/2010 (Prv Comp)    Override on 6/6/2010: Previously completed (in [place], normal)            Current Immunizations     13-VALENT PCV PREVNAR 12/19/2016    Influenza LAIV (Nasal) 11/16/2012, 10/22/2011    Influenza TIV (IM) 10/2/2014, 11/1/2013    Influenza Vaccine Adult HD 12/19/2016, 9/23/2015    Pneumococcal Vaccine (UF)Historical Data 3/1/2010      Below and/or attached are the medications your provider expects you to take. Review all of your home medications and newly ordered medications with your provider and/or pharmacist. Follow medication instructions as directed by your provider and/or pharmacist. Please keep your medication list with you and share with your provider. Update the information when medications are discontinued, doses are changed, or new medications (including over-the-counter products) are added; and carry medication information at all times in the event of emergency situations     Allergies:  COLCHICINE - Diarrhea,Vomiting     IBUPROFEN - (reactions not documented)               Medications  Valid as of: May 23, 2017 -  6:46 PM    Generic Name Brand Name Tablet Size Instructions for use    Allopurinol (Tab) ZYLOPRIM 100 MG Take 2 Tabs by mouth every day.        AmLODIPine Besylate (Tab) NORVASC 2.5 MG Take 1 Tab by mouth every day.        Ascorbic Acid (Tab) Vitamin C 1000 MG Take 1 Tab by mouth every day. Dr. Bloch        Aspirin (Tablet Delayed Response) ECOTRIN 81 MG Take 81 mg by mouth every day.        Atorvastatin Calcium (Tab) LIPITOR 20 MG Take 1 Tab by mouth every day.        Carvedilol (Tab)  COREG 25 MG Take 0.5 Tabs by mouth 2 times a day, with meals.        Clopidogrel Bisulfate (Tab) PLAVIX 75 MG TAKE ONE TABLET BY MOUTH EVERY DAY        Ergocalciferol (Cap) DRISDOL 78912 UNITS Take 1 Cap by mouth every 14 days.        Ferrous Sulfate (Tab) Iron 325 (65 FE) MG Take 1 Tab by mouth every Monday, Wednesday, and Friday.        Fluorometholone (Suspension) FML 0.1 % Place  in left eye every day.        Isosorbide Mononitrate (TABLET SR 24 HR) IMDUR 60 MG Take 1 Tab by mouth every day.        Levothyroxine Sodium (Tab) SYNTHROID 137 MCG Take 1 Tab by mouth Every morning on an empty stomach.        Meclizine HCl (Tab) ANTIVERT 25 MG Take 1 Tab by mouth every 6 hours as needed for Dizziness, Nausea/Vomiting or Vertigo.        Nitroglycerin (SL Tab) NITROSTAT 0.4 MG Place 1 Tab under tongue as needed for Chest Pain.        Ranolazine (TABLET SR 12 HR) RANEXA 500 MG Take 1 Tab by mouth 2 times a day.        Tamsulosin HCl (Cap) FLOMAX 0.4 MG TAKE  ONE CAPSULE BY MOUTH EVERY MORNING 30 MINUTES AFTER BREAKFAST        Terazosin HCl (Cap) HYTRIN 2 MG TAKE 1 CAPSULE BY MOUTH EVERY DAY        .                 Medicines prescribed today were sent to:     [id number] - [place], [place] - [address]    [address] [address]    Phone: [phone] Fax: [phone]    Open 24 Hours?: No      Medication refill instructions:       If your prescription bottle indicates you have medication refills left, it is not necessary to call your provider’s office. Please contact your pharmacy and they will refill your medication.    If your prescription bottle indicates you do not have any refills left, you may request refills at any time through one of the following ways: The online [employer] system (except Urgent Care), by calling your provider’s office, or by asking your pharmacy to contact your provider’s office with a refill request. Medication refills are processed only during regular business hours and  may not be available until the next business day. Your provider may request additional information or to have a follow-up visit with you prior to refilling your medication.   *Please Note: Medication refills are assigned a new Rx number when refilled electronically. Your pharmacy may indicate that no refills were authorized even though a new prescription for the same medication is available at the pharmacy. Please request the medicine by name with the pharmacy before contacting your provider for a refill.        Your To Do List     Future Labs/Procedures Complete By Expires    DX-HAND 3+ LEFT  As directed 5/23/2018      Other Notes About Your Plan     Patient is enrolled in [facility] with Dr. Mccray    Pt goes Skip           [employer] Access Code: Activation code not generated  Current [employer] Status: Active           Pancytopenia

## 2020-03-09 NOTE — ED AVS SNAPSHOT
Home Care Instructions                                                                                                                Erica Fisher   MRN: [id number]    Department:  [facility], Emergency Dept   Date of Visit:  4/29/2017            [facility] Emergency Dept    [address] [address]    Phone:  [phone]      You were seen by     David Dawson M.D.      Your Diagnosis Was     Chronic confusion     R41.0       These are the medications you received during your hospitalization from 04/29/2017 2107 to 04/30/2017 0124     Date/Time Order Dose Route Action    04/29/2017 2250 NS infusion 1,000 mL 1,000 mL Intravenous New Bag    04/30/2017 0046 NS infusion 1,000 mL 1,000 mL Intravenous New Bag      Follow-up Information     1. Follow up with Evelin Mccray M.D. In 2 days.    Specialty:  Family Medicine    Contact information    [address] [place] [address] [address]  [address]  [facility] [address] [phone]          2. Follow up with [facility] Emergency Dept.    Specialty:  Emergency Medicine    Why:  As needed, If symptoms worsen    Contact information    [address] [place] [address] [phone]      Medication Information     Review all of your home medications and newly ordered medications with your primary doctor and/or pharmacist as soon as possible. Follow medication instructions as directed by your doctor and/or pharmacist.     Please keep your complete medication list with you and share with your physician. Update the information when medications are discontinued, doses are changed, or new medications (including over-the-counter products) are added; and carry medication information at all times in the event of emergency situations.               Medication List      ASK your doctor about these medications        Instructions    Morning Afternoon Evening Bedtime    allopurinol 100 MG Tabs   Commonly known as:  ZYLOPRIM        Take  1 Tab by mouth every day.   Dose:  100 mg                        amlodipine 5 MG Tabs   Commonly known as:  NORVASC        TAKE ONE TABLET BY MOUTH TWICE DAILY                        aspirin EC 81 MG Tbec   Commonly known as:  ECOTRIN        Take 81 mg by mouth every day.   Dose:  81 mg                        atorvastatin 20 MG Tabs   Commonly known as:  LIPITOR        Doctor's comments:  Called to pharmacy [phone]   Take 1 Tab by mouth every day.   Dose:  20 mg                        carvedilol 25 MG Tabs   Commonly known as:  COREG        Doctor's comments:  **Patient requests 90 days supply**   Take 0.5 Tabs by mouth 2 times a day, with meals.   Dose:  12.5 mg                        clopidogrel 75 MG Tabs   Commonly known as:  PLAVIX        TAKE ONE TABLET BY MOUTH EVERY DAY                        fluorometholone 0.1 % Susp   Commonly known as:  FML        Place  in left eye every day.                        hydrochlorothiazide 25 MG Tabs   Commonly known as:  HYDRODIURIL        TK 1 T PO  ONCE D                        irbesartan 150 MG Tabs   Commonly known as:  AVAPRO        TAKE ONE TABLET BY MOUTH EVERY DAY                        Iron 325 (65 FE) MG Tabs        Take 1 Tab by mouth every Monday, Wednesday, and Friday.   Dose:  1 Tab                        isosorbide mononitrate SR 60 MG Tb24   Commonly known as:  IMDUR        Doctor's comments:  Please note dose increase   Take 1 Tab by mouth every day.   Dose:  60 mg                        levothyroxine 137 MCG Tabs   Commonly known as:  SYNTHROID        Take 1 Tab by mouth Every morning on an empty stomach.   Dose:  137 mcg                        nitroglycerin 0.4 MG Subl   Commonly known as:  NITROSTAT        Place 1 Tab under tongue as needed for Chest Pain.   Dose:  0.4 mg                        RANEXA 500 MG Tb12   Generic drug:  ranolazine        Take 1 Tab by mouth 2 times a day.   Dose:  500 mg                        tamsulosin 0.4 MG capsule    Detail Level: Detailed   Commonly known as:  FLOMAX        TAKE  ONE CAPSULE BY MOUTH EVERY MORNING 30 MINUTES AFTER BREAKFAST                        terazosin 2 MG Caps   Commonly known as:  HYTRIN        Doctor's comments:  90 day supply   TAKE 1 CAPSULE BY MOUTH EVERY DAY                        Vitamin C 1000 MG Tabs        Take 1 Tab by mouth every day. Dr. Bloch   Dose:  1 Tab                        vitamin D (Ergocalciferol) 59335 UNITS Caps capsule   Commonly known as:  DRISDOL        Take 1 Cap by mouth every 14 days.   Dose:  70903 Units                                Procedures and tests performed during your visit     Procedure/Test Number of Times Performed    BLOOD CULTURE 2    CARDIAC MONITORING 1    CBC WITH DIFFERENTIAL 1    COMP METABOLIC PANEL 1    DX-CHEST-PORTABLE (1 VIEW) 1    EKG (ER) 1    ESTIMATED GFR 1    IV Saline Lock 1    LACTIC ACID 2    OXYGEN THERAPY PER PROTOCOL 1    TROPONIN 1    URINALYSIS 1    URINE CULTURE(NEW) 1        Discharge Instructions       Confusion  Confusion is the inability to think with your usual speed or clarity. Confusion may come on quickly or slowly over time. How quickly the confusion comes on depends on the cause. Confusion can be due to any number of causes.  CAUSES   · Concussion, head injury, or head trauma.  · Seizures.  · Stroke.  · Fever.  · Brain tumor.  · Age related decreased brain function (dementia).  · Heightened emotional states like rage or terror.  · Mental illness in which the person loses the ability to determine what is real and what is not (hallucinations).  · Infections such as a urinary tract infection (UTI).  · Toxic effects from alcohol, drugs, or prescription medicines.  · Dehydration and an imbalance of salts in the body (electrolytes).  · Lack of sleep.  · Low blood sugar (diabetes).  · Low levels of oxygen from conditions such as chronic lung disorders.  · Drug interactions or other medicine side effects.  · Nutritional deficiencies, especially niacin,  thiamine, vitamin C, or vitamin B.  · Sudden drop in body temperature (hypothermia).  · Change in routine, such as when traveling or hospitalized.  SIGNS AND SYMPTOMS   People often describe their thinking as cloudy or unclear when they are confused. Confusion can also include feeling disoriented. That means you are unaware of where or who you are. You may also not know what the date or time is. If confused, you may also have difficulty paying attention, remembering, and making decisions. Some people also act aggressively when they are confused.   DIAGNOSIS   The medical evaluation of confusion may include:  · Blood and urine tests.  · X-rays.  · Brain and nervous system tests.  · Analyzing your brain waves (electroencephalogram or EEG).  · Magnetic resonance imaging (MRI) of your head.  · Computed tomography (CT) scan of your head.  · Mental status tests in which your health care provider may ask many questions. Some of these questions may seem silly or strange, but they are a very important test to help diagnose and treat confusion.  TREATMENT   An admission to the hospital may not be needed, but a person with confusion should not be left alone. Stay with a family member or friend until the confusion clears. Avoid alcohol, pain relievers, or sedative drugs until you have fully recovered. Do not drive until directed by your health care provider.  HOME CARE INSTRUCTIONS   What family and friends can do:  · To find out if someone is confused, ask the person to state his or her name, age, and the date. If the person is unsure or answers incorrectly, he or she is confused.  · Always introduce yourself, no matter how well the person knows you.  · Often remind the person of his or her location.  · Place a calendar and clock near the confused person.  · Help the person with his or her medicines. You may want to use a pill box, an alarm as a reminder, or give the person each dose as prescribed.  · Talk about current events  and plans for the day.  · Try to keep the environment calm, quiet, and peaceful.  · Make sure the person keeps follow-up visits with his or her health care provider.  PREVENTION   Ways to prevent confusion:  · Avoid alcohol.  · Eat a balanced diet.  · Get enough sleep.  · Take medicine only as directed by your health care provider.  · Do not become isolated. Spend time with other people and make plans for your days.  · Keep careful watch on your blood sugar levels if you are diabetic.  SEEK IMMEDIATE MEDICAL CARE IF:   · You develop severe headaches, repeated vomiting, seizures, blackouts, or slurred speech.  · There is increasing confusion, weakness, numbness, restlessness, or personality changes.  · You develop a loss of balance, have marked dizziness, feel uncoordinated, or fall.  · You have delusions, hallucinations, or develop severe anxiety.  · Your family members think you need to be rechecked.     This information is not intended to replace advice given to you by your health care provider. Make sure you discuss any questions you have with your health care provider.     Document Released: 01/25/2006 Document Revised: 01/08/2016 Document Reviewed: 01/23/2015  [employer] Patient Education ©2016 [employer]            Patient Information     Patient Information    Following emergency treatment: all patient requiring follow-up care must return either to a private physician or a clinic if your condition worsens before you are able to obtain further medical attention, please return to the emergency room.     Billing Information    At [facility], we work to make the billing process streamlined for our patients.  Our Representatives are here to answer any questions you may have regarding your hospital bill.  If you have insurance coverage and have supplied your insurance information to us, we will submit a claim to your insurer on your behalf.  Should you have any questions regarding your bill, we can  be reached online or by phone as follows:  Online: You are able pay your bills online or live chat with our representatives about any billing questions you may have. We are here to help Monday - Friday from 8:00am to 7:30pm and 9:00am - 12:00pm on Saturdays.  Please visit [email or web address]  for more information.   Phone:  [phone] or [phone]    Please note that your emergency physician, surgeon, pathologist, radiologist, anesthesiologist, and other specialists are not employed by [facility] and will therefore bill separately for their services.  Please contact them directly for any questions concerning their bills at the numbers below:     Emer[facility] Physician Services:  [phone]  [facility]:  [phone]  [facility]:  [phone]  [facility]:  [phone]    1. Your final bill may vary from the amount quoted upon discharge if all procedures are not complete at that time, or if your doctor has additional procedures of which we are not aware. You will receive an additional bill if you return to the Emergency Department at [facility] for suture removal regardless of the facility of which the sutures were placed.     2. Please arrange for settlement of this account at the emergency registration.    3. All self-pay accounts are due in full at the time of treatment.  If you are unable to meet this obligation then payment is expected within 4-5 days.     4. If you have had radiology studies (CT, X-ray, Ultrasound, MRI), you have received a preliminary result during your emergency department visit. Please contact the radiology department [phone] to receive a copy of your final result. Please discuss the Final result with your primary physician or with the follow up physician provided.     Crisis Hotline:  [place] Crisis Hotline:  7-521-YIYGXTK or 1-660.418.5064  [place] Crisis Hotline:    1-769.993.7572 or  104.828.3259         ED Discharge Follow Up Questions    1. In order to provide you with very good care, we would like to follow up with a phone call in the next few days.  May we have your permission to contact you?     YES /  NO    2. What is the best phone number to call you? (       )_____-__________    3. What is the best time to call you?      Morning  /  Afternoon  /  Evening                   Patient Signature:  ____________________________________________________________    Date:  ____________________________________________________________      Your appointments     May 11, 2017 10:15 AM   Adult Draw/Collection with [facility] [place]   [facility] (--)    [address]  [facility] [address]   [phone]            May 23, 2017 10:20 AM   Follow Up Visit with Jose Levy M.D.   [facility] (--)    [address], [address]  [facility] [id number] [phone]           You will be receiving a confirmation call a few days before your appointment from our automated call confirmation system.                        Quality [facility]: Preventive Care And Screening: Influenza Immunization: Influenza Immunization not Administered for Documented Reasons.

## 2022-08-22 NOTE — ASSESSMENT & PLAN NOTE
-No symptoms of bleed, recent thyroid fxn ok.  -Seems chronic from how iron looks   No restrictions/Showering allowed

## 2023-03-23 NOTE — TELEPHONE ENCOUNTER
Left message for pt. That samples are available for him to  (Ranexa 500mg #21 samples) at .   Not applicable

## 2024-11-08 NOTE — PROGRESS NOTES
Bedside report performed with Chani. Pt is a/o, safety check performed, all possessions and call bell within reach. POC and doctors orders addressed as needed.    0